# Patient Record
Sex: MALE | Race: BLACK OR AFRICAN AMERICAN | Employment: OTHER
[De-identification: names, ages, dates, MRNs, and addresses within clinical notes are randomized per-mention and may not be internally consistent; named-entity substitution may affect disease eponyms.]

---

## 2017-01-30 ENCOUNTER — OFFICE VISIT (OUTPATIENT)
Dept: INTERNAL MEDICINE | Facility: CLINIC | Age: 67
End: 2017-01-30

## 2017-01-30 VITALS
DIASTOLIC BLOOD PRESSURE: 75 MMHG | HEIGHT: 74 IN | WEIGHT: 173 LBS | SYSTOLIC BLOOD PRESSURE: 127 MMHG | HEART RATE: 76 BPM | BODY MASS INDEX: 22.2 KG/M2

## 2017-01-30 DIAGNOSIS — I25.10 CORONARY ARTERY DISEASE INVOLVING NATIVE CORONARY ARTERY OF NATIVE HEART WITHOUT ANGINA PECTORIS: ICD-10-CM

## 2017-01-30 DIAGNOSIS — K63.5 COLON POLYPS: Primary | ICD-10-CM

## 2017-01-30 DIAGNOSIS — E78.2 MIXED HYPERLIPIDEMIA: ICD-10-CM

## 2017-01-30 PROCEDURE — 4040F PNEUMOC VAC/ADMIN/RCVD: CPT | Performed by: INTERNAL MEDICINE

## 2017-01-30 PROCEDURE — G8598 ASA/ANTIPLAT THER USED: HCPCS | Performed by: INTERNAL MEDICINE

## 2017-01-30 PROCEDURE — 1036F TOBACCO NON-USER: CPT | Performed by: INTERNAL MEDICINE

## 2017-01-30 PROCEDURE — 1123F ACP DISCUSS/DSCN MKR DOCD: CPT | Performed by: INTERNAL MEDICINE

## 2017-01-30 PROCEDURE — G8427 DOCREV CUR MEDS BY ELIG CLIN: HCPCS | Performed by: INTERNAL MEDICINE

## 2017-01-30 PROCEDURE — G8419 CALC BMI OUT NRM PARAM NOF/U: HCPCS | Performed by: INTERNAL MEDICINE

## 2017-01-30 PROCEDURE — G8484 FLU IMMUNIZE NO ADMIN: HCPCS | Performed by: INTERNAL MEDICINE

## 2017-01-30 PROCEDURE — 3017F COLORECTAL CA SCREEN DOC REV: CPT | Performed by: INTERNAL MEDICINE

## 2017-01-30 PROCEDURE — 99213 OFFICE O/P EST LOW 20 MIN: CPT | Performed by: INTERNAL MEDICINE

## 2017-01-30 RX ORDER — ACETAMINOPHEN/DIPHENHYDRAMINE 500MG-25MG
TABLET ORAL
Refills: 0 | COMMUNITY
Start: 2017-01-14 | End: 2017-01-30

## 2017-01-30 RX ORDER — ROSUVASTATIN CALCIUM 20 MG/1
20 TABLET, COATED ORAL NIGHTLY
Qty: 90 TABLET | Refills: 1 | Status: SHIPPED | OUTPATIENT
Start: 2017-01-30 | End: 2017-05-08 | Stop reason: SDUPTHER

## 2017-01-30 RX ORDER — CARVEDILOL 3.12 MG/1
3.12 TABLET ORAL 2 TIMES DAILY WITH MEALS
Qty: 180 TABLET | Refills: 1 | Status: SHIPPED | OUTPATIENT
Start: 2017-01-30 | End: 2017-05-08 | Stop reason: SDUPTHER

## 2017-04-13 ENCOUNTER — TELEPHONE (OUTPATIENT)
Dept: INTERNAL MEDICINE | Age: 67
End: 2017-04-13

## 2017-05-08 ENCOUNTER — HOSPITAL ENCOUNTER (OUTPATIENT)
Age: 67
Setting detail: SPECIMEN
Discharge: HOME | End: 2017-05-08
Payer: MEDICARE

## 2017-05-08 ENCOUNTER — OFFICE VISIT (OUTPATIENT)
Dept: INTERNAL MEDICINE | Age: 67
End: 2017-05-08
Payer: MEDICARE

## 2017-05-08 VITALS
HEIGHT: 74 IN | HEART RATE: 75 BPM | OXYGEN SATURATION: 99 % | WEIGHT: 174 LBS | TEMPERATURE: 98.1 F | SYSTOLIC BLOOD PRESSURE: 150 MMHG | DIASTOLIC BLOOD PRESSURE: 93 MMHG | BODY MASS INDEX: 22.33 KG/M2

## 2017-05-08 DIAGNOSIS — R06.02 SOBOE (SHORTNESS OF BREATH ON EXERTION): ICD-10-CM

## 2017-05-08 DIAGNOSIS — I10 ESSENTIAL HYPERTENSION: ICD-10-CM

## 2017-05-08 DIAGNOSIS — Z23 NEED FOR PROPHYLACTIC VACCINATION AND INOCULATION AGAINST VARICELLA: ICD-10-CM

## 2017-05-08 DIAGNOSIS — E78.2 MIXED HYPERLIPIDEMIA: ICD-10-CM

## 2017-05-08 DIAGNOSIS — F17.200 SMOKING: ICD-10-CM

## 2017-05-08 DIAGNOSIS — Z23 NEED FOR PROPHYLACTIC VACCINATION AGAINST DIPHTHERIA-TETANUS-PERTUSSIS (DTP): ICD-10-CM

## 2017-05-08 DIAGNOSIS — I25.10 CORONARY ARTERY DISEASE INVOLVING NATIVE CORONARY ARTERY OF NATIVE HEART WITHOUT ANGINA PECTORIS: Primary | ICD-10-CM

## 2017-05-08 LAB
ANION GAP SERPL CALCULATED.3IONS-SCNC: 13 MMOL/L (ref 9–17)
BUN BLDV-MCNC: 17 MG/DL (ref 8–23)
BUN/CREAT BLD: ABNORMAL (ref 9–20)
CALCIUM SERPL-MCNC: 9.3 MG/DL (ref 8.6–10.4)
CHLORIDE BLD-SCNC: 104 MMOL/L (ref 98–107)
CO2: 24 MMOL/L (ref 20–31)
CREAT SERPL-MCNC: 0.67 MG/DL (ref 0.7–1.2)
GFR AFRICAN AMERICAN: >60 ML/MIN
GFR NON-AFRICAN AMERICAN: >60 ML/MIN
GFR SERPL CREATININE-BSD FRML MDRD: ABNORMAL ML/MIN/{1.73_M2}
GFR SERPL CREATININE-BSD FRML MDRD: ABNORMAL ML/MIN/{1.73_M2}
GLUCOSE BLD-MCNC: 89 MG/DL (ref 70–99)
POTASSIUM SERPL-SCNC: 4.2 MMOL/L (ref 3.7–5.3)
SODIUM BLD-SCNC: 141 MMOL/L (ref 135–144)

## 2017-05-08 PROCEDURE — 99213 OFFICE O/P EST LOW 20 MIN: CPT

## 2017-05-08 PROCEDURE — G8419 CALC BMI OUT NRM PARAM NOF/U: HCPCS | Performed by: INTERNAL MEDICINE

## 2017-05-08 PROCEDURE — 4040F PNEUMOC VAC/ADMIN/RCVD: CPT | Performed by: INTERNAL MEDICINE

## 2017-05-08 PROCEDURE — 99214 OFFICE O/P EST MOD 30 MIN: CPT | Performed by: INTERNAL MEDICINE

## 2017-05-08 PROCEDURE — 1123F ACP DISCUSS/DSCN MKR DOCD: CPT | Performed by: INTERNAL MEDICINE

## 2017-05-08 PROCEDURE — 3017F COLORECTAL CA SCREEN DOC REV: CPT | Performed by: INTERNAL MEDICINE

## 2017-05-08 PROCEDURE — 1036F TOBACCO NON-USER: CPT | Performed by: INTERNAL MEDICINE

## 2017-05-08 PROCEDURE — 36415 COLL VENOUS BLD VENIPUNCTURE: CPT

## 2017-05-08 PROCEDURE — 80048 BASIC METABOLIC PNL TOTAL CA: CPT

## 2017-05-08 PROCEDURE — G8427 DOCREV CUR MEDS BY ELIG CLIN: HCPCS | Performed by: INTERNAL MEDICINE

## 2017-05-08 PROCEDURE — G8598 ASA/ANTIPLAT THER USED: HCPCS | Performed by: INTERNAL MEDICINE

## 2017-05-08 RX ORDER — CARVEDILOL 6.25 MG/1
6.25 TABLET ORAL 2 TIMES DAILY WITH MEALS
Qty: 60 TABLET | Refills: 2 | Status: SHIPPED | OUTPATIENT
Start: 2017-05-08 | End: 2017-08-29 | Stop reason: ALTCHOICE

## 2017-05-08 RX ORDER — LISINOPRIL 5 MG/1
5 TABLET ORAL DAILY
Qty: 30 TABLET | Refills: 2 | Status: SHIPPED | OUTPATIENT
Start: 2017-05-08 | End: 2017-08-12 | Stop reason: SDUPTHER

## 2017-05-08 RX ORDER — ROSUVASTATIN CALCIUM 20 MG/1
20 TABLET, COATED ORAL NIGHTLY
Qty: 90 TABLET | Refills: 1 | Status: SHIPPED | OUTPATIENT
Start: 2017-05-08 | End: 2017-12-08 | Stop reason: SDUPTHER

## 2017-05-12 ENCOUNTER — TELEPHONE (OUTPATIENT)
Dept: INTERNAL MEDICINE | Age: 67
End: 2017-05-12

## 2017-05-24 ENCOUNTER — HOSPITAL ENCOUNTER (OUTPATIENT)
Dept: NON INVASIVE DIAGNOSTICS | Age: 67
Discharge: HOME | End: 2017-05-24
Payer: MEDICARE

## 2017-05-24 ENCOUNTER — HOSPITAL ENCOUNTER (OUTPATIENT)
Dept: VASCULAR LAB | Age: 67
Discharge: HOME | End: 2017-05-24
Payer: MEDICARE

## 2017-05-24 DIAGNOSIS — F17.200 SMOKING: ICD-10-CM

## 2017-05-24 DIAGNOSIS — R06.02 SOBOE (SHORTNESS OF BREATH ON EXERTION): ICD-10-CM

## 2017-05-24 DIAGNOSIS — Z13.6 SCREENING FOR AAA (ABDOMINAL AORTIC ANEURYSM): ICD-10-CM

## 2017-05-24 DIAGNOSIS — I25.10 CORONARY ARTERY DISEASE INVOLVING NATIVE CORONARY ARTERY OF NATIVE HEART WITHOUT ANGINA PECTORIS: ICD-10-CM

## 2017-05-24 LAB
LV EF: 58 %
LVEF MODALITY: NORMAL

## 2017-05-24 PROCEDURE — 76706 US ABDL AORTA SCREEN AAA: CPT

## 2017-05-24 PROCEDURE — 93306 TTE W/DOPPLER COMPLETE: CPT

## 2017-07-07 ENCOUNTER — OFFICE VISIT (OUTPATIENT)
Dept: INTERNAL MEDICINE | Age: 67
End: 2017-07-07
Payer: MEDICARE

## 2017-07-07 VITALS
HEART RATE: 72 BPM | DIASTOLIC BLOOD PRESSURE: 74 MMHG | SYSTOLIC BLOOD PRESSURE: 129 MMHG | BODY MASS INDEX: 21.17 KG/M2 | HEIGHT: 74 IN | WEIGHT: 165 LBS

## 2017-07-07 DIAGNOSIS — Z23 NEED FOR SHINGLES VACCINE: ICD-10-CM

## 2017-07-07 DIAGNOSIS — I25.10 CORONARY ARTERY DISEASE INVOLVING NATIVE CORONARY ARTERY OF NATIVE HEART WITHOUT ANGINA PECTORIS: ICD-10-CM

## 2017-07-07 DIAGNOSIS — H04.123 DRY EYES, BILATERAL: ICD-10-CM

## 2017-07-07 DIAGNOSIS — Z23 NEED FOR TDAP VACCINATION: ICD-10-CM

## 2017-07-07 DIAGNOSIS — E78.2 MIXED HYPERLIPIDEMIA: ICD-10-CM

## 2017-07-07 DIAGNOSIS — I10 ESSENTIAL HYPERTENSION: Primary | ICD-10-CM

## 2017-07-07 PROCEDURE — G8420 CALC BMI NORM PARAMETERS: HCPCS | Performed by: INTERNAL MEDICINE

## 2017-07-07 PROCEDURE — 1036F TOBACCO NON-USER: CPT | Performed by: INTERNAL MEDICINE

## 2017-07-07 PROCEDURE — 4040F PNEUMOC VAC/ADMIN/RCVD: CPT | Performed by: INTERNAL MEDICINE

## 2017-07-07 PROCEDURE — 3017F COLORECTAL CA SCREEN DOC REV: CPT | Performed by: INTERNAL MEDICINE

## 2017-07-07 PROCEDURE — 99213 OFFICE O/P EST LOW 20 MIN: CPT | Performed by: INTERNAL MEDICINE

## 2017-07-07 PROCEDURE — 1123F ACP DISCUSS/DSCN MKR DOCD: CPT | Performed by: INTERNAL MEDICINE

## 2017-07-07 PROCEDURE — G8427 DOCREV CUR MEDS BY ELIG CLIN: HCPCS | Performed by: INTERNAL MEDICINE

## 2017-07-07 PROCEDURE — 99212 OFFICE O/P EST SF 10 MIN: CPT

## 2017-07-07 PROCEDURE — G8598 ASA/ANTIPLAT THER USED: HCPCS | Performed by: INTERNAL MEDICINE

## 2017-07-07 RX ORDER — CARBOXYMETHYLCELLULOSE SODIUM 5 MG/ML
1 SOLUTION/ DROPS OPHTHALMIC 3 TIMES DAILY
Qty: 1 BOTTLE | Refills: 0 | Status: SHIPPED | OUTPATIENT
Start: 2017-07-07 | End: 2019-04-11

## 2017-08-07 DIAGNOSIS — I25.10 CORONARY ARTERY DISEASE INVOLVING NATIVE CORONARY ARTERY OF NATIVE HEART WITHOUT ANGINA PECTORIS: ICD-10-CM

## 2017-08-12 DIAGNOSIS — I10 ESSENTIAL HYPERTENSION: ICD-10-CM

## 2017-08-12 DIAGNOSIS — I25.10 CORONARY ARTERY DISEASE INVOLVING NATIVE CORONARY ARTERY OF NATIVE HEART WITHOUT ANGINA PECTORIS: ICD-10-CM

## 2017-08-29 RX ORDER — CARVEDILOL 3.12 MG/1
TABLET ORAL
Qty: 180 TABLET | Refills: 1 | Status: SHIPPED | OUTPATIENT
Start: 2017-08-29 | End: 2017-08-29 | Stop reason: ALTCHOICE

## 2017-08-29 RX ORDER — CARVEDILOL 6.25 MG/1
TABLET ORAL
Qty: 60 TABLET | Refills: 2 | Status: SHIPPED | OUTPATIENT
Start: 2017-08-29 | End: 2017-11-10 | Stop reason: SDUPTHER

## 2017-08-29 RX ORDER — LISINOPRIL 5 MG/1
TABLET ORAL
Qty: 30 TABLET | Refills: 2 | Status: SHIPPED | OUTPATIENT
Start: 2017-08-29 | End: 2017-11-10 | Stop reason: SDUPTHER

## 2017-11-10 ENCOUNTER — OFFICE VISIT (OUTPATIENT)
Dept: INTERNAL MEDICINE | Age: 67
End: 2017-11-10
Payer: MEDICARE

## 2017-11-10 VITALS
WEIGHT: 168 LBS | HEIGHT: 74 IN | RESPIRATION RATE: 16 BRPM | SYSTOLIC BLOOD PRESSURE: 180 MMHG | DIASTOLIC BLOOD PRESSURE: 90 MMHG | BODY MASS INDEX: 21.56 KG/M2 | HEART RATE: 71 BPM

## 2017-11-10 DIAGNOSIS — I25.10 CORONARY ARTERY DISEASE INVOLVING NATIVE CORONARY ARTERY OF NATIVE HEART WITHOUT ANGINA PECTORIS: Primary | ICD-10-CM

## 2017-11-10 DIAGNOSIS — I10 ESSENTIAL HYPERTENSION: ICD-10-CM

## 2017-11-10 DIAGNOSIS — E78.2 MIXED HYPERLIPIDEMIA: ICD-10-CM

## 2017-11-10 DIAGNOSIS — F10.10 ALCOHOL ABUSE: ICD-10-CM

## 2017-11-10 PROCEDURE — 3017F COLORECTAL CA SCREEN DOC REV: CPT | Performed by: INTERNAL MEDICINE

## 2017-11-10 PROCEDURE — 99213 OFFICE O/P EST LOW 20 MIN: CPT | Performed by: INTERNAL MEDICINE

## 2017-11-10 PROCEDURE — 1123F ACP DISCUSS/DSCN MKR DOCD: CPT | Performed by: INTERNAL MEDICINE

## 2017-11-10 PROCEDURE — G8484 FLU IMMUNIZE NO ADMIN: HCPCS | Performed by: INTERNAL MEDICINE

## 2017-11-10 PROCEDURE — G8427 DOCREV CUR MEDS BY ELIG CLIN: HCPCS | Performed by: INTERNAL MEDICINE

## 2017-11-10 PROCEDURE — G8598 ASA/ANTIPLAT THER USED: HCPCS | Performed by: INTERNAL MEDICINE

## 2017-11-10 PROCEDURE — 1036F TOBACCO NON-USER: CPT | Performed by: INTERNAL MEDICINE

## 2017-11-10 PROCEDURE — G8420 CALC BMI NORM PARAMETERS: HCPCS | Performed by: INTERNAL MEDICINE

## 2017-11-10 PROCEDURE — 4040F PNEUMOC VAC/ADMIN/RCVD: CPT | Performed by: INTERNAL MEDICINE

## 2017-11-10 RX ORDER — LISINOPRIL 5 MG/1
TABLET ORAL
Qty: 30 TABLET | Refills: 5 | Status: CANCELLED | OUTPATIENT
Start: 2017-11-10

## 2017-11-10 RX ORDER — LISINOPRIL 10 MG/1
TABLET ORAL
Qty: 30 TABLET | Refills: 2 | Status: SHIPPED | OUTPATIENT
Start: 2017-11-10 | End: 2018-02-02 | Stop reason: SDUPTHER

## 2017-11-10 RX ORDER — CARVEDILOL 6.25 MG/1
TABLET ORAL
Qty: 60 TABLET | Refills: 5 | Status: CANCELLED | OUTPATIENT
Start: 2017-11-10

## 2017-11-10 RX ORDER — CARVEDILOL 6.25 MG/1
TABLET ORAL
Qty: 60 TABLET | Refills: 2 | Status: SHIPPED | OUTPATIENT
Start: 2017-11-10 | End: 2018-07-12 | Stop reason: SDUPTHER

## 2017-11-10 ASSESSMENT — PATIENT HEALTH QUESTIONNAIRE - PHQ9
SUM OF ALL RESPONSES TO PHQ9 QUESTIONS 1 & 2: 0
SUM OF ALL RESPONSES TO PHQ QUESTIONS 1-9: 0
2. FEELING DOWN, DEPRESSED OR HOPELESS: 0
1. LITTLE INTEREST OR PLEASURE IN DOING THINGS: 0

## 2017-11-10 NOTE — PROGRESS NOTES
[facility]    Progress Note    Date of patient's visit: 11/10/2017  YOB: 1950  Patient's Name:  James Glaser    Patient Care Team:  Shaina Latif MD as PCP - General (Internal Medicine)  Charmayne Roe, MD as PCP - [facility] Attributed Provider    REASON FOR VISIT: Routine outpatient follow     HISTORY OF PRESENT ILLNESS:    History was obtained from the patient. James Glaser is a 69 y.o. is here for a  Follow up visit. Blood pressure 170-180/. He is on coreg 3.125 mg  Bid currently. He is not taking lisinopril. Discussed with him need to be compliant with meds. He is going to pharmacy to . Also drinking alcohol >4 drinks yesterday night. Patient Active Problem List   Diagnosis    Coronary artery disease s/p stent in 2014 by Dr. Mala Kan at [facility]    Mixed hyperlipidemia       ALLERGIES    No Known Allergies      MEDICATIONS:      Current Outpatient Prescriptions on File Prior to Visit   Medication Sig Dispense Refill    carvedilol (COREG) 6.25 MG tablet take 1 tablet by mouth twice a day with meals 60 tablet 2    carboxymethylcellulose (LUBRICANT EYE DROPS) 0.5 % SOLN ophthalmic solution Place 1 drop into both eyes 3 times daily 1 Bottle 0    aspirin 81 MG tablet Take 1 tablet by mouth daily 90 tablet 1    rosuvastatin (CRESTOR) 20 MG tablet Take 1 tablet by mouth nightly 90 tablet 1    nitroGLYCERIN (NITROSTAT) 0.4 MG SL tablet Place 0.4 mg under the tongue every 5 minutes as needed for Chest pain Dissolve 1 tab under tongue at first sign of chest pain. May repeat every 5 minutes until relief is obtained. If pain persists after taking 3 tabs in a 15-minute period, or the pain is different than is typically experienced, call 9-1-1 immediately.  lisinopril (PRINIVIL;ZESTRIL) 5 MG tablet take 1 tablet by mouth once daily 30 tablet 2     No current facility-administered medications on file prior to visit. SOCIAL HISTORY    Reviewed and no change from previous record. Gio Savage  reports that he has quit smoking. He has never used smokeless tobacco.    FAMILY HISTORY:    Reviewed and No change from previous visit    REVIEW OF SYSTEMS:    ENT: negative  Respiratory: no cough, shortness of breath, or wheezing  Cardiovascular: no chest pain or dyspnea on exertion  Gastrointestinal: no abdominal pain, black or bloody stools  Neurological: no TIA or stroke symptoms  Endocrine: negative  Genito-Urinary: no dysuria, trouble voiding, or hematuria  Musculoskeletal: negative  Dermatological: negative    PHYSICAL EXAM:      Vitals:    11/10/17 1436   BP: (!) 180/90   Pulse:    Resp:      Physical Exam   Constitutional: He is oriented to person, place, and time. He appears well-nourished. HENT:   Head: Atraumatic. Eyes: Conjunctivae are normal.   Neck: Neck supple. Cardiovascular: Normal rate. Pulmonary/Chest: Effort normal and breath sounds normal.   Abdominal: Soft. Neurological: He is alert and oriented to person, place, and time. Skin: Skin is warm. Psychiatric: He has a normal mood and affect.          LABORATORY FINDINGS:    CBC:  Lab Results   Component Value Date    WBC 6.9 09/30/2016    HGB 14.6 09/30/2016     09/30/2016     10/18/2011     BMP:    Lab Results   Component Value Date     05/08/2017    K 4.2 05/08/2017     05/08/2017    CO2 24 05/08/2017    BUN 17 05/08/2017    CREATININE 0.67 05/08/2017    GLUCOSE 89 05/08/2017    GLUCOSE 96 10/18/2011     HEMOGLOBIN A1C: No results found for: LABA1C  MICROALBUMIN URINE: No results found for: MICROALBUR  FASTING LIPID PANEL:  Lab Results   Component Value Date    CHOL 146 09/30/2016    HDL 76 09/30/2016    TRIG 56 09/30/2016     LIVER PROFILE:  Lab Results   Component Value Date    ALT 23 10/18/2011    AST 30 10/18/2011    BILITOT 0.44 10/18/2011    LABALBU 4.6 10/18/2011      THYROID FUNCTION:   Lab Results   Component Value Date TSH 1.16 09/30/2016      URINE ANALYSIS: No results found for: LABURIN  ASSESSMENT AND PLAN:    1. Coronary artery disease s/p stent in 2014 by Dr. Horacio Muller at [facility]    - carvedilol (COREG) 6.25 MG tablet; take 1 tablet by mouth twice a day with meals  Dispense: 60 tablet; Refill: 5  - follow with cardiology    2. Mixed hyperlipidemia  Stable on zocor      3. Essential hypertension uncontrolled  - increase coreg to 6.25 mg bid  - start taking lisinopril 5 mg po  - reduce alcohol cunsumption      FOLLOW UP:   2 weeks    1. Amber Raymond received counseling on the following healthy behaviors: exercise and medication adherence  2. Patient given educational materials - see patient instructions  3. Discussed use, benefit, and side effects of prescribed medications. Barriers to medication compliance addressed. All patient questions answered. Pt voiced understanding. 4.  Reviewed prior labs and health maintenance  5. Continue current medications, diet and exercise.          Solis Pack MD, PGY-1 Internal Medicine Resident  [address] [place] [address]  11/10/2017  2:38 PM

## 2017-11-10 NOTE — PROGRESS NOTES
HYPERTENSION visit     BP Readings from Last 3 Encounters:   07/07/17 129/74   05/08/17 (!) 150/93   01/30/17 127/75       LDL Cholesterol (mg/dL)   Date Value   09/30/2016 59     HDL (mg/dL)   Date Value   09/30/2016 76     BUN (mg/dL)   Date Value   05/08/2017 17     CREATININE (mg/dL)   Date Value   05/08/2017 0.67 (L)     Glucose (mg/dL)   Date Value   05/08/2017 89   10/18/2011 96              Have you changed or started any medications since your last visit including any over-the-counter medicines, vitamins, or herbal medicines? no   Have you stopped taking any of your medications? Is so, why? -  yes - heart doctor to him off one of his blood pressure medication   Are you having any side effects from any of your medications? - no    Have you seen any other physician or provider since your last visit?  no   Have you had any other diagnostic tests since your last visit?  no   Have you been seen in the emergency room and/or had an admission in a hospital since we last saw you?  no   Have you had your routine dental cleaning in the past 6 months?  no     Do you have an active MyChart account? If no, what is the barrier?   No: declined     Patient Care Team:  Corinna Bardales MD as PCP - General (Internal Medicine)  Uzair Romero MD as PCP - [facility] Attributed Provider    Medical History Review  Past Medical, Family, and Social History reviewed and does contribute to the patient presenting condition    Health Maintenance   Topic Date Due    Hepatitis C screen  1950    DTaP/Tdap/Td vaccine (1 - Tdap) 06/27/1967    Zostavax vaccine  06/27/2008    Pneumococcal low/med risk (1 of 2 - PCV13) 06/27/2013    Flu vaccine (1) 09/01/2017    Colon cancer screen colonoscopy  04/07/2019    Lipid screen  09/30/2021    AAA screen  Completed

## 2017-11-10 NOTE — PROGRESS NOTES
ATTENDING PHYSICIAN STATEMENT     I have discussed the care of Aman Soliman, including pertinent history and exam findings with the resident. I have reviewed the key elements of all parts of the encounter with the resident. I have seen and examined the patient with the resident and the key elements of all parts of the encounter have been performed by me. I agree with the assessment, and status of the problem list as documented. Additional Comments:  Patient is here to follow-up for hypertension, coronary artery disease. His blood pressure is elevated. He is on Coreg 3.125 mg daily. Seems like he has not been taking his lisinopril. He has history of coronary artery disease with a stent greater than 2 years back. He has been on aspirin. We will increase the dose of Coreg to 6.25 and lisinopril to 10 mg daily. Patient declined flu and pneumonia vaccination. The plan and orders should include  1. Coronary artery disease s/p stent in 2014 by Dr. Kimberly Flowers at [facility]  carvedilol (COREG) 6.25 MG tablet   2. Mixed hyperlipidemia     3. Essential hypertension  carvedilol (COREG) 6.25 MG tablet   4. Alcohol abuse            The return visit should be in 2 weeks . Tonie Moran MD  Attending and Faculty Internal Medicine  [address] [place] [address]  Internal Medicine [address] [place] [address], [facility]   11/10/17 2:50 PM      This note is created with the assistance of a speech-recognition program. While intending to generate a document that actually reflects the content of the visit, the document can still have some mistakes which may not have been identified and corrected by editing.

## 2017-11-20 ENCOUNTER — HOSPITAL ENCOUNTER (OUTPATIENT)
Age: 67
Setting detail: SPECIMEN
Discharge: HOME | End: 2017-11-20
Payer: MEDICARE

## 2017-11-20 DIAGNOSIS — E78.2 MIXED HYPERLIPIDEMIA: ICD-10-CM

## 2017-11-20 DIAGNOSIS — I10 ESSENTIAL HYPERTENSION: ICD-10-CM

## 2017-11-20 LAB
CHOLESTEROL/HDL RATIO: 1.7
CHOLESTEROL: 129 MG/DL
HDLC SERPL-MCNC: 74 MG/DL
LDL CHOLESTEROL: 43 MG/DL (ref 0–130)
TRIGL SERPL-MCNC: 59 MG/DL
VLDLC SERPL CALC-MCNC: NORMAL MG/DL (ref 1–30)

## 2017-11-20 PROCEDURE — 80061 LIPID PANEL: CPT

## 2017-11-20 PROCEDURE — 36415 COLL VENOUS BLD VENIPUNCTURE: CPT

## 2017-12-08 ENCOUNTER — OFFICE VISIT (OUTPATIENT)
Dept: INTERNAL MEDICINE | Age: 67
End: 2017-12-08
Payer: MEDICARE

## 2017-12-08 VITALS
HEART RATE: 66 BPM | BODY MASS INDEX: 21.53 KG/M2 | SYSTOLIC BLOOD PRESSURE: 135 MMHG | DIASTOLIC BLOOD PRESSURE: 84 MMHG | WEIGHT: 167.8 LBS

## 2017-12-08 DIAGNOSIS — I25.10 CORONARY ARTERY DISEASE INVOLVING NATIVE CORONARY ARTERY OF NATIVE HEART WITHOUT ANGINA PECTORIS: ICD-10-CM

## 2017-12-08 DIAGNOSIS — F10.10 ALCOHOL ABUSE: ICD-10-CM

## 2017-12-08 DIAGNOSIS — Z23 NEED FOR INFLUENZA VACCINATION: ICD-10-CM

## 2017-12-08 DIAGNOSIS — I10 ESSENTIAL HYPERTENSION: Primary | ICD-10-CM

## 2017-12-08 DIAGNOSIS — E78.2 MIXED HYPERLIPIDEMIA: ICD-10-CM

## 2017-12-08 PROCEDURE — 1123F ACP DISCUSS/DSCN MKR DOCD: CPT | Performed by: INTERNAL MEDICINE

## 2017-12-08 PROCEDURE — G0008 ADMIN INFLUENZA VIRUS VAC: HCPCS | Performed by: INTERNAL MEDICINE

## 2017-12-08 PROCEDURE — G8484 FLU IMMUNIZE NO ADMIN: HCPCS | Performed by: INTERNAL MEDICINE

## 2017-12-08 PROCEDURE — 4040F PNEUMOC VAC/ADMIN/RCVD: CPT | Performed by: INTERNAL MEDICINE

## 2017-12-08 PROCEDURE — 3017F COLORECTAL CA SCREEN DOC REV: CPT | Performed by: INTERNAL MEDICINE

## 2017-12-08 PROCEDURE — G8420 CALC BMI NORM PARAMETERS: HCPCS | Performed by: INTERNAL MEDICINE

## 2017-12-08 PROCEDURE — 1036F TOBACCO NON-USER: CPT | Performed by: INTERNAL MEDICINE

## 2017-12-08 PROCEDURE — 90688 IIV4 VACCINE SPLT 0.5 ML IM: CPT | Performed by: INTERNAL MEDICINE

## 2017-12-08 PROCEDURE — 99213 OFFICE O/P EST LOW 20 MIN: CPT | Performed by: INTERNAL MEDICINE

## 2017-12-08 PROCEDURE — G8427 DOCREV CUR MEDS BY ELIG CLIN: HCPCS | Performed by: INTERNAL MEDICINE

## 2017-12-08 PROCEDURE — G8598 ASA/ANTIPLAT THER USED: HCPCS | Performed by: INTERNAL MEDICINE

## 2017-12-08 RX ORDER — ROSUVASTATIN CALCIUM 20 MG/1
20 TABLET, COATED ORAL NIGHTLY
Qty: 90 TABLET | Refills: 1 | Status: SHIPPED | OUTPATIENT
Start: 2017-12-08 | End: 2018-07-12 | Stop reason: SDUPTHER

## 2017-12-08 NOTE — PROGRESS NOTES
[facility]    Progress Note    Date of patient's visit: 12/8/2017  YOB: 1950  Patient's Name:  Ashley Jacinto    Patient Care Team:  Mony Cardona MD as PCP - General (Internal Medicine)  Ryan Landers MD as PCP - [facility] Attributed Provider    REASON FOR VISIT: Routine outpatient follow     HISTORY OF PRESENT ILLNESS:    History was obtained from the patient. Ashley Jacinto is a 69 y.o. is here for blood pressure. Blood pressure still elevated 144/90. He is complain with meds. He continues to drink but refused to tell us how much he drinks. Non smoker. He follows with cardiology. Discussed alcohol cessation and pt wants to cutdown slowly. Patient Active Problem List   Diagnosis    Coronary artery disease s/p stent in 2014 by Dr. Judy Chaparro at [facility]    Mixed hyperlipidemia       ALLERGIES    No Known Allergies      MEDICATIONS:      Current Outpatient Prescriptions on File Prior to Visit   Medication Sig Dispense Refill    carvedilol (COREG) 6.25 MG tablet take 1 tablet by mouth twice a day with meals 60 tablet 2    lisinopril (PRINIVIL;ZESTRIL) 10 MG tablet take 1 tablet by mouth once daily 30 tablet 2    carboxymethylcellulose (LUBRICANT EYE DROPS) 0.5 % SOLN ophthalmic solution Place 1 drop into both eyes 3 times daily 1 Bottle 0    aspirin 81 MG tablet Take 1 tablet by mouth daily 90 tablet 1    rosuvastatin (CRESTOR) 20 MG tablet Take 1 tablet by mouth nightly 90 tablet 1    nitroGLYCERIN (NITROSTAT) 0.4 MG SL tablet Place 0.4 mg under the tongue every 5 minutes as needed for Chest pain Dissolve 1 tab under tongue at first sign of chest pain. May repeat every 5 minutes until relief is obtained. If pain persists after taking 3 tabs in a 15-minute period, or the pain is different than is typically experienced, call 9-1-1 immediately. No current facility-administered medications on file prior to visit.       SOCIAL HISTORY    Reviewed and no change from previous record. Rosaura Medley  reports that he has quit smoking. He has never used smokeless tobacco.    FAMILY HISTORY:    Reviewed and No change from previous visit    REVIEW OF SYSTEMS:    ENT: negative  Respiratory: no cough, shortness of breath, or wheezing  Cardiovascular: no chest pain or dyspnea on exertion  Gastrointestinal: no abdominal pain, black or bloody stools  Neurological: no TIA or stroke symptoms  Endocrine: negative  Genito-Urinary: no dysuria, trouble voiding, or hematuria  Musculoskeletal: negative  Dermatological: negative    PHYSICAL EXAM:      Vitals:    12/08/17 0907   BP: (!) 144/89   Pulse: 68     Physical Exam   Constitutional: He appears well-developed. HENT:   Head: Atraumatic. Eyes: EOM are normal.   Neck: Neck supple. Cardiovascular: Normal rate. Neurological: He is alert. Skin: Skin is warm. Psychiatric: He has a normal mood and affect. LABORATORY FINDINGS:    CBC:  Lab Results   Component Value Date    WBC 6.9 09/30/2016    HGB 14.6 09/30/2016     09/30/2016     10/18/2011     BMP:    Lab Results   Component Value Date     05/08/2017    K 4.2 05/08/2017     05/08/2017    CO2 24 05/08/2017    BUN 17 05/08/2017    CREATININE 0.67 05/08/2017    GLUCOSE 89 05/08/2017    GLUCOSE 96 10/18/2011     HEMOGLOBIN A1C: No results found for: LABA1C  MICROALBUMIN URINE: No results found for: MICROALBUR  FASTING LIPID PANEL:  Lab Results   Component Value Date    CHOL 129 11/20/2017    HDL 74 11/20/2017    TRIG 59 11/20/2017     LIVER PROFILE:  Lab Results   Component Value Date    ALT 23 10/18/2011    AST 30 10/18/2011    BILITOT 0.44 10/18/2011    LABALBU 4.6 10/18/2011      THYROID FUNCTION:   Lab Results   Component Value Date    TSH 1.16 09/30/2016      URINE ANALYSIS: No results found for: LABURIN  ASSESSMENT AND PLAN:    1.  Essential hypertension uncontrolled  - cont lisinopril 10 mg   - cont coreg 6.26 mg

## 2017-12-08 NOTE — PROGRESS NOTES
Attending Physician Statement GE  I have discussed the care of Megan Bauer, including pertinent history and exam findings with the resident. I have reviewed the key elements of all parts of the encounter with the resident.     htn-continue Lisinopril 10 mg, Coreg-6.25 mg BID  Dyslipidemia-Crestor 20 mg  CAD s/p stents 2014-ASA 81. crestor 20 mg, Lisinopril 10 mg, Coreg-6.25 mg BID- saw cardiology- was told to follow up in 6 months  Does not smoke  Alcohol use- counseled for decreasing alcohol consumption  Flu shot  Today  Refused other vaccines. Will order hep C antibody the next and we'll order blood work.     Follow up in 6 months    Charlotte Ibarra MD

## 2017-12-08 NOTE — PROGRESS NOTES
Visit Information    Have you changed or started any medications since your last visit including any over-the-counter medicines, vitamins, or herbal medicines? no   Are you having any side effects from any of your medications? -  no  Have you stopped taking any of your medications? Is so, why? -  no    Have you seen any other physician or provider since your last visit? No  Have you had any other diagnostic tests since your last visit? No  Have you been seen in the emergency room and/or had an admission to a hospital since we last saw you? No  Have you had your routine dental cleaning in the past 6 months? no    Have you activated your Spark account? If not, what are your barriers?  No:      Patient Care Team:  Bakari Lal MD as PCP - General (Internal Medicine)  Kaila Manley MD as PCP - S Attributed Provider    Medical History Review  Past Medical, Family, and Social History reviewed and does contribute to the patient presenting condition    Health Maintenance   Topic Date Due    Zostavax vaccine  02/28/2018 (Originally 6/27/2008)   [place] [employer] DTaP/Tdap/Td vaccine (1 - Tdap) 02/28/2018 (Originally 6/27/1967)    Flu vaccine (1) 02/28/2018 (Originally 9/1/2017)    Pneumococcal low/med risk (1 of 2 - PCV13) 02/28/2018 (Originally 6/27/2013)    Hepatitis C screen  02/28/2018 (Originally 1950)    Colon cancer screen colonoscopy  04/07/2019    Lipid screen  11/20/2022    AAA screen  Completed

## 2018-02-02 DIAGNOSIS — I10 ESSENTIAL HYPERTENSION: ICD-10-CM

## 2018-02-02 NOTE — TELEPHONE ENCOUNTER
Lisinopril 10 mg pending for refill       Next Visit Date:  Future Appointments  Date Time Provider Hieu Meili   6/8/2018 10:15 AM Wendy Cedeño MD [address] [facility] Maintenance   Topic Date Due    Zostavax vaccine  02/28/2018 (Originally 6/27/2008)    DTaP/Tdap/Td vaccine (1 - Tdap) 02/28/2018 (Originally 6/27/1967)    Pneumococcal low/med risk (1 of 2 - PCV13) 02/28/2018 (Originally 6/27/2013)    Hepatitis C screen  02/28/2018 (Originally 1950)    Potassium monitoring  05/08/2018    Creatinine monitoring  05/08/2018    Colon cancer screen colonoscopy  04/07/2019    Lipid screen  11/20/2022    Flu vaccine  Completed    AAA screen  Completed             (applicable per patient's age: Cancer Screenings, Depression Screening, Fall Risk Screening, Immunizations)    LDL Cholesterol (mg/dL)   Date Value   11/20/2017 43     AST (U/L)   Date Value   10/18/2011 30     ALT (U/L)   Date Value   10/18/2011 23     BUN (mg/dL)   Date Value   05/08/2017 17      (goal A1C is < 7)   (goal LDL is <100) need 30-50% reduction from baseline     BP Readings from Last 3 Encounters:   12/08/17 135/84   11/10/17 (!) 180/90   07/07/17 129/74    (goal /80)      All Future Testing planned in [facility]:  Lab Frequency Next Occurrence            Patient Active Problem List:     Coronary artery disease s/p stent in 2014 by Dr. Tanna Noguera at [facility]     Mixed hyperlipidemia

## 2018-02-04 RX ORDER — LISINOPRIL 10 MG/1
TABLET ORAL
Qty: 30 TABLET | Refills: 2 | Status: SHIPPED | OUTPATIENT
Start: 2018-02-04 | End: 2018-05-09 | Stop reason: SDUPTHER

## 2018-05-09 DIAGNOSIS — I25.10 CORONARY ARTERY DISEASE INVOLVING NATIVE CORONARY ARTERY OF NATIVE HEART WITHOUT ANGINA PECTORIS: ICD-10-CM

## 2018-05-09 DIAGNOSIS — I10 ESSENTIAL HYPERTENSION: ICD-10-CM

## 2018-05-17 RX ORDER — LISINOPRIL 10 MG/1
TABLET ORAL
Qty: 90 TABLET | Refills: 0 | Status: SHIPPED | OUTPATIENT
Start: 2018-05-17 | End: 2018-07-12 | Stop reason: SDUPTHER

## 2018-05-17 RX ORDER — ACETAMINOPHEN/DIPHENHYDRAMINE 500MG-25MG
TABLET ORAL
Qty: 90 TABLET | Refills: 1 | Status: SHIPPED | OUTPATIENT
Start: 2018-05-17 | End: 2018-07-12 | Stop reason: ALTCHOICE

## 2018-07-12 ENCOUNTER — OFFICE VISIT (OUTPATIENT)
Dept: INTERNAL MEDICINE | Age: 68
End: 2018-07-12
Payer: MEDICARE

## 2018-07-12 ENCOUNTER — HOSPITAL ENCOUNTER (OUTPATIENT)
Age: 68
Setting detail: SPECIMEN
Discharge: HOME | End: 2018-07-12
Payer: MEDICARE

## 2018-07-12 VITALS
HEIGHT: 74 IN | HEART RATE: 83 BPM | WEIGHT: 165 LBS | BODY MASS INDEX: 21.17 KG/M2 | SYSTOLIC BLOOD PRESSURE: 125 MMHG | DIASTOLIC BLOOD PRESSURE: 79 MMHG

## 2018-07-12 DIAGNOSIS — Z11.59 ENCOUNTER FOR HEPATITIS C SCREENING TEST FOR LOW RISK PATIENT: ICD-10-CM

## 2018-07-12 DIAGNOSIS — I10 ESSENTIAL HYPERTENSION: Primary | ICD-10-CM

## 2018-07-12 DIAGNOSIS — E78.2 MIXED HYPERLIPIDEMIA: ICD-10-CM

## 2018-07-12 DIAGNOSIS — I10 ESSENTIAL HYPERTENSION: ICD-10-CM

## 2018-07-12 DIAGNOSIS — I25.10 CORONARY ARTERY DISEASE INVOLVING NATIVE CORONARY ARTERY OF NATIVE HEART WITHOUT ANGINA PECTORIS: ICD-10-CM

## 2018-07-12 LAB
ANION GAP SERPL CALCULATED.3IONS-SCNC: 15 MMOL/L (ref 9–17)
BUN BLDV-MCNC: 14 MG/DL (ref 8–23)
BUN/CREAT BLD: NORMAL (ref 9–20)
CALCIUM SERPL-MCNC: 9.6 MG/DL (ref 8.6–10.4)
CHLORIDE BLD-SCNC: 100 MMOL/L (ref 98–107)
CO2: 27 MMOL/L (ref 20–31)
CREAT SERPL-MCNC: 0.79 MG/DL (ref 0.7–1.2)
GFR AFRICAN AMERICAN: >60 ML/MIN
GFR NON-AFRICAN AMERICAN: >60 ML/MIN
GFR SERPL CREATININE-BSD FRML MDRD: NORMAL ML/MIN/{1.73_M2}
GFR SERPL CREATININE-BSD FRML MDRD: NORMAL ML/MIN/{1.73_M2}
GLUCOSE BLD-MCNC: 93 MG/DL (ref 70–99)
HEPATITIS C ANTIBODY: NONREACTIVE
POTASSIUM SERPL-SCNC: 4 MMOL/L (ref 3.7–5.3)
SODIUM BLD-SCNC: 142 MMOL/L (ref 135–144)

## 2018-07-12 PROCEDURE — G8420 CALC BMI NORM PARAMETERS: HCPCS | Performed by: INTERNAL MEDICINE

## 2018-07-12 PROCEDURE — 99213 OFFICE O/P EST LOW 20 MIN: CPT | Performed by: INTERNAL MEDICINE

## 2018-07-12 PROCEDURE — 86803 HEPATITIS C AB TEST: CPT

## 2018-07-12 PROCEDURE — 80048 BASIC METABOLIC PNL TOTAL CA: CPT

## 2018-07-12 PROCEDURE — 36415 COLL VENOUS BLD VENIPUNCTURE: CPT

## 2018-07-12 PROCEDURE — 3017F COLORECTAL CA SCREEN DOC REV: CPT | Performed by: INTERNAL MEDICINE

## 2018-07-12 PROCEDURE — 4040F PNEUMOC VAC/ADMIN/RCVD: CPT | Performed by: INTERNAL MEDICINE

## 2018-07-12 PROCEDURE — 1036F TOBACCO NON-USER: CPT | Performed by: INTERNAL MEDICINE

## 2018-07-12 PROCEDURE — 1101F PT FALLS ASSESS-DOCD LE1/YR: CPT | Performed by: INTERNAL MEDICINE

## 2018-07-12 PROCEDURE — 1123F ACP DISCUSS/DSCN MKR DOCD: CPT | Performed by: INTERNAL MEDICINE

## 2018-07-12 PROCEDURE — G8598 ASA/ANTIPLAT THER USED: HCPCS | Performed by: INTERNAL MEDICINE

## 2018-07-12 PROCEDURE — G8427 DOCREV CUR MEDS BY ELIG CLIN: HCPCS | Performed by: INTERNAL MEDICINE

## 2018-07-12 RX ORDER — ROSUVASTATIN CALCIUM 20 MG/1
20 TABLET, COATED ORAL NIGHTLY
Qty: 90 TABLET | Refills: 1 | Status: SHIPPED | OUTPATIENT
Start: 2018-07-12 | End: 2018-08-09 | Stop reason: SDUPTHER

## 2018-07-12 RX ORDER — CARBOXYMETHYLCELLULOSE SODIUM 5 MG/ML
1 SOLUTION/ DROPS OPHTHALMIC 3 TIMES DAILY
Qty: 1 BOTTLE | Refills: 0 | Status: CANCELLED | OUTPATIENT
Start: 2018-07-12

## 2018-07-12 RX ORDER — ASPIRIN 81 MG/1
TABLET ORAL
Qty: 90 TABLET | Refills: 1 | Status: CANCELLED | OUTPATIENT
Start: 2018-07-12

## 2018-07-12 RX ORDER — LISINOPRIL 10 MG/1
TABLET ORAL
Qty: 90 TABLET | Refills: 0 | Status: SHIPPED | OUTPATIENT
Start: 2018-07-12 | End: 2018-08-09 | Stop reason: SDUPTHER

## 2018-07-12 RX ORDER — ASPIRIN 81 MG/1
81 TABLET ORAL DAILY
Qty: 30 TABLET | Refills: 3 | Status: SHIPPED | OUTPATIENT
Start: 2018-07-12 | End: 2018-08-09 | Stop reason: SDUPTHER

## 2018-07-12 RX ORDER — CARVEDILOL 6.25 MG/1
TABLET ORAL
Qty: 60 TABLET | Refills: 2 | Status: SHIPPED | OUTPATIENT
Start: 2018-07-12 | End: 2018-08-09 | Stop reason: SDUPTHER

## 2018-07-12 RX ORDER — NITROGLYCERIN 0.4 MG/1
0.4 TABLET SUBLINGUAL EVERY 5 MIN PRN
Qty: 25 TABLET | Status: CANCELLED | OUTPATIENT
Start: 2018-07-12

## 2018-07-12 NOTE — PROGRESS NOTES
Chronic Disease Visit Information    BP Readings from Last 3 Encounters:   12/08/17 135/84   11/10/17 (!) 180/90   07/07/17 129/74          LDL Cholesterol (mg/dL)   Date Value   11/20/2017 43     HDL (mg/dL)   Date Value   11/20/2017 74     BUN (mg/dL)   Date Value   05/08/2017 17     CREATININE (mg/dL)   Date Value   05/08/2017 0.67 (L)     Glucose (mg/dL)   Date Value   05/08/2017 89   10/18/2011 96            Have you changed or started any medications since your last visit including any over-the-counter medicines, vitamins, or herbal medicines? no   Are you having any side effects from any of your medications? -  no  Have you stopped taking any of your medications? Is so, why? -  no    Have you seen any other physician or provider since your last visit? No  Have you had any other diagnostic tests since your last visit? No  Have you been seen in the emergency room and/or had an admission to a hospital since we last saw you? No  Have you had your annual diabetic retinal (eye) exam? No  Have you had your routine dental cleaning in the past 6 months? no    Have you activated your PayPal account? If not, what are your barriers?  Yes     Patient Care Team:  Eve Mensah MD as PCP - General (Internal Medicine)  Efrain Bergeron MD as PCP - S Attributed Provider         Medical History Review  Past Medical, Family, and Social History reviewed and does not contribute to the patient presenting condition    Health Maintenance   Topic Date Due    Hepatitis C screen  1950    DTaP/Tdap/Td vaccine (1 - Tdap) 06/27/1967    Shingles Vaccine (1 of 2 - 2 Dose Series) 06/27/1998    Pneumococcal low/med risk (1 of 2 - PCV13) 06/27/2013    Potassium monitoring  05/08/2018    Creatinine monitoring  05/08/2018    Flu vaccine (1) 09/01/2018    Colon cancer screen colonoscopy  04/07/2019    Lipid screen  11/20/2022    AAA screen  Completed
tablet by mouth twice a day with meals 60 tablet 2    carboxymethylcellulose (LUBRICANT EYE DROPS) 0.5 % SOLN ophthalmic solution Place 1 drop into both eyes 3 times daily 1 Bottle 0    nitroGLYCERIN (NITROSTAT) 0.4 MG SL tablet Place 0.4 mg under the tongue every 5 minutes as needed for Chest pain Dissolve 1 tab under tongue at first sign of chest pain. May repeat every 5 minutes until relief is obtained. If pain persists after taking 3 tabs in a 15-minute period, or the pain is different than is typically experienced, call 9-1-1 immediately. No current facility-administered medications on file prior to visit. SOCIAL HISTORY    Reviewed and no change from previous record. Timi Amanda  reports that he quit smoking about 23 years ago. He has never used smokeless tobacco.    FAMILY HISTORY:    Reviewed and No change from previous visit    REVIEW OF SYSTEMS:    ENT: negative  Respiratory: no cough, shortness of breath, or wheezing  Cardiovascular: no chest pain or dyspnea on exertion  Gastrointestinal: no abdominal pain, black or bloody stools  Neurological: no TIA or stroke symptoms  Endocrine: negative  Genito-Urinary: no dysuria, trouble voiding, or hematuria  Musculoskeletal: negative  Dermatological: negative    PHYSICAL EXAM:      Vitals:    07/12/18 0949   BP: 125/79   Pulse: 83     Physical Exam   Constitutional: He appears well-nourished. HENT:   Head: Atraumatic. Eyes: Conjunctivae are normal.   Neck: Neck supple. Cardiovascular: Normal rate. Pulmonary/Chest: Effort normal.   Abdominal: Soft. Neurological: He is alert. Skin: Skin is warm. Psychiatric: He has a normal mood and affect.          LABORATORY FINDINGS:    CBC:  Lab Results   Component Value Date    WBC 6.9 09/30/2016    HGB 14.6 09/30/2016     09/30/2016     10/18/2011     BMP:    Lab Results   Component Value Date     05/08/2017    K 4.2 05/08/2017     05/08/2017    CO2 24 05/08/2017    BUN 17

## 2018-07-12 NOTE — PATIENT INSTRUCTIONS
with you to registration department. OC-Light hemoccult collection kit given to patient and procedure explained.       Pietro Epps

## 2018-07-23 ENCOUNTER — TELEPHONE (OUTPATIENT)
Dept: INTERNAL MEDICINE | Age: 68
End: 2018-07-23

## 2018-08-09 ENCOUNTER — OFFICE VISIT (OUTPATIENT)
Dept: INTERNAL MEDICINE | Age: 68
End: 2018-08-09
Payer: MEDICARE

## 2018-08-09 VITALS
BODY MASS INDEX: 20.8 KG/M2 | SYSTOLIC BLOOD PRESSURE: 132 MMHG | DIASTOLIC BLOOD PRESSURE: 78 MMHG | HEART RATE: 76 BPM | WEIGHT: 162 LBS

## 2018-08-09 DIAGNOSIS — E78.2 MIXED HYPERLIPIDEMIA: ICD-10-CM

## 2018-08-09 DIAGNOSIS — I25.10 CORONARY ARTERY DISEASE INVOLVING NATIVE CORONARY ARTERY OF NATIVE HEART WITHOUT ANGINA PECTORIS: ICD-10-CM

## 2018-08-09 DIAGNOSIS — I10 ESSENTIAL HYPERTENSION: ICD-10-CM

## 2018-08-09 PROCEDURE — G8598 ASA/ANTIPLAT THER USED: HCPCS | Performed by: INTERNAL MEDICINE

## 2018-08-09 PROCEDURE — 4040F PNEUMOC VAC/ADMIN/RCVD: CPT | Performed by: INTERNAL MEDICINE

## 2018-08-09 PROCEDURE — 99213 OFFICE O/P EST LOW 20 MIN: CPT | Performed by: INTERNAL MEDICINE

## 2018-08-09 PROCEDURE — 1123F ACP DISCUSS/DSCN MKR DOCD: CPT | Performed by: INTERNAL MEDICINE

## 2018-08-09 PROCEDURE — G8427 DOCREV CUR MEDS BY ELIG CLIN: HCPCS | Performed by: INTERNAL MEDICINE

## 2018-08-09 PROCEDURE — 1101F PT FALLS ASSESS-DOCD LE1/YR: CPT | Performed by: INTERNAL MEDICINE

## 2018-08-09 PROCEDURE — G8420 CALC BMI NORM PARAMETERS: HCPCS | Performed by: INTERNAL MEDICINE

## 2018-08-09 PROCEDURE — 3017F COLORECTAL CA SCREEN DOC REV: CPT | Performed by: INTERNAL MEDICINE

## 2018-08-09 PROCEDURE — 1036F TOBACCO NON-USER: CPT | Performed by: INTERNAL MEDICINE

## 2018-08-09 RX ORDER — ASPIRIN 81 MG/1
81 TABLET ORAL DAILY
Qty: 30 TABLET | Refills: 5 | Status: SHIPPED | OUTPATIENT
Start: 2018-08-09 | End: 2019-02-14 | Stop reason: SDUPTHER

## 2018-08-09 RX ORDER — LISINOPRIL 10 MG/1
TABLET ORAL
Qty: 90 TABLET | Refills: 5 | Status: SHIPPED | OUTPATIENT
Start: 2018-08-09 | End: 2019-02-14 | Stop reason: ALTCHOICE

## 2018-08-09 RX ORDER — ROSUVASTATIN CALCIUM 20 MG/1
20 TABLET, COATED ORAL NIGHTLY
Qty: 30 TABLET | Refills: 5 | Status: SHIPPED | OUTPATIENT
Start: 2018-08-09 | End: 2019-02-14 | Stop reason: SDUPTHER

## 2018-08-09 RX ORDER — CARVEDILOL 6.25 MG/1
TABLET ORAL
Qty: 60 TABLET | Refills: 5 | Status: SHIPPED | OUTPATIENT
Start: 2018-08-09 | End: 2019-02-14 | Stop reason: SDUPTHER

## 2018-08-09 NOTE — PROGRESS NOTES
Attending Supervising Physicians Attestation Statement  The patient met the criteria for indirect supervision. I discussed the findings and plans with the resident physician and agree as documented in his note .      Electronically signed by Wil Rankin MD on 8/9/18 at 10:26 AM
LABALBU 4.6 10/18/2011      THYROID FUNCTION:   Lab Results   Component Value Date    TSH 1.16 09/30/2016      URINE ANALYSIS: No results found for: LABURIN  ASSESSMENT AND PLAN:    1. Essential hypertension    - lisinopril (PRINIVIL;ZESTRIL) 10 MG tablet; take 1 tablet by mouth once daily  Dispense: 90 tablet; Refill: 5  - carvedilol (COREG) 6.25 MG tablet; take 1 tablet by mouth twice a day with meals  Dispense: 60 tablet; Refill: 5    2. Coronary artery disease s/p stent in 2014 by Dr. Vu Noel at [facility]    - aspirin EC 81 MG EC tablet; Take 1 tablet by mouth daily  Dispense: 30 tablet; Refill: 5  - carvedilol (COREG) 6.25 MG tablet; take 1 tablet by mouth twice a day with meals  Dispense: 60 tablet; Refill: 5    3. Mixed hyperlipidemia    - rosuvastatin (CRESTOR) 20 MG tablet; Take 1 tablet by mouth nightly  Dispense: 30 tablet; Refill: 5      FOLLOW UP:   6 months    1. Jeanette Feng received counseling on the following healthy behaviors: nutrition, exercise, medication adherence and decrease in alcohol consumption  2. Patient given educational materials - see patient instructions  3. Discussed use, benefit, and side effects of prescribed medications. Barriers to medication compliance addressed. All patient questions answered. Pt voiced understanding. 4.  Reviewed prior labs and health maintenance  5. Continue current medications, diet and exercise.          Meredith Meehan MD, Internal Medicine Resident  [facility]  8/9/2018  10:20 AM

## 2018-08-09 NOTE — PATIENT INSTRUCTIONS
Return To Clinic 2/14/18. After Visit Summary given and reviewed. bb    It is very important for your care that you keep your appointment. If for some reason you are unable to keep your appointment it is equally important that you call our office at [phone] to cancel your appointment and reschedule. Failure to do so may result in your termination from our practice.

## 2018-08-17 ENCOUNTER — HOSPITAL ENCOUNTER (OUTPATIENT)
Age: 68
Discharge: HOME | End: 2018-08-17
Payer: MEDICARE

## 2018-08-17 DIAGNOSIS — Z12.11 COLON CANCER SCREENING: Primary | ICD-10-CM

## 2018-08-17 LAB
CONTROL: NORMAL
HEMOCCULT STL QL: NORMAL

## 2018-08-17 PROCEDURE — 82274 ASSAY TEST FOR BLOOD FECAL: CPT | Performed by: INTERNAL MEDICINE

## 2018-09-23 ENCOUNTER — HOSPITAL ENCOUNTER (EMERGENCY)
Age: 68
Discharge: HOME | End: 2018-09-23
Attending: EMERGENCY MEDICINE
Payer: MEDICARE

## 2018-09-23 VITALS
OXYGEN SATURATION: 100 % | BODY MASS INDEX: 21.17 KG/M2 | SYSTOLIC BLOOD PRESSURE: 160 MMHG | WEIGHT: 165 LBS | TEMPERATURE: 98.4 F | HEART RATE: 67 BPM | DIASTOLIC BLOOD PRESSURE: 81 MMHG | HEIGHT: 74 IN | RESPIRATION RATE: 10 BRPM

## 2018-09-23 DIAGNOSIS — N48.30 PRIAPISM: Primary | ICD-10-CM

## 2018-09-23 LAB
-: ABNORMAL
AMORPHOUS: ABNORMAL
ANION GAP SERPL CALCULATED.3IONS-SCNC: 14 MMOL/L (ref 9–17)
BACTERIA: ABNORMAL
BILIRUBIN URINE: NEGATIVE
BUN BLDV-MCNC: 19 MG/DL (ref 8–23)
BUN/CREAT BLD: ABNORMAL (ref 9–20)
CALCIUM SERPL-MCNC: 9.4 MG/DL (ref 8.6–10.4)
CASTS UA: ABNORMAL /LPF (ref 0–2)
CHLORIDE BLD-SCNC: 100 MMOL/L (ref 98–107)
CO2: 25 MMOL/L (ref 20–31)
COLOR: YELLOW
CREAT SERPL-MCNC: 0.79 MG/DL (ref 0.7–1.2)
CRYSTALS, UA: ABNORMAL /HPF
EPITHELIAL CELLS UA: ABNORMAL /HPF (ref 0–5)
GFR AFRICAN AMERICAN: >60 ML/MIN
GFR NON-AFRICAN AMERICAN: >60 ML/MIN
GFR SERPL CREATININE-BSD FRML MDRD: ABNORMAL ML/MIN/{1.73_M2}
GFR SERPL CREATININE-BSD FRML MDRD: ABNORMAL ML/MIN/{1.73_M2}
GLUCOSE BLD-MCNC: 109 MG/DL (ref 70–99)
GLUCOSE URINE: NEGATIVE
HCT VFR BLD CALC: 36.5 % (ref 40.7–50.3)
HEMOGLOBIN: 12.4 G/DL (ref 13–17)
INR BLD: 1
KETONES, URINE: ABNORMAL
LEUKOCYTE ESTERASE, URINE: ABNORMAL
MCH RBC QN AUTO: 32.2 PG (ref 25.2–33.5)
MCHC RBC AUTO-ENTMCNC: 34 G/DL (ref 28.4–34.8)
MCV RBC AUTO: 94.8 FL (ref 82.6–102.9)
MUCUS: ABNORMAL
NITRITE, URINE: NEGATIVE
NRBC AUTOMATED: 0 PER 100 WBC
OTHER OBSERVATIONS UA: ABNORMAL
PARTIAL THROMBOPLASTIN TIME: 24.5 SEC (ref 20.5–30.5)
PDW BLD-RTO: 12.9 % (ref 11.8–14.4)
PH UA: 7 (ref 5–8)
PLATELET # BLD: 263 K/UL (ref 138–453)
PMV BLD AUTO: 9.9 FL (ref 8.1–13.5)
POTASSIUM SERPL-SCNC: 4.2 MMOL/L (ref 3.7–5.3)
PROTEIN UA: ABNORMAL
PROTHROMBIN TIME: 10.4 SEC (ref 9–12)
RBC # BLD: 3.85 M/UL (ref 4.21–5.77)
RBC UA: ABNORMAL /HPF (ref 0–2)
RENAL EPITHELIAL, UA: ABNORMAL /HPF
SODIUM BLD-SCNC: 139 MMOL/L (ref 135–144)
SPECIFIC GRAVITY UA: 1.02 (ref 1–1.03)
TRICHOMONAS: ABNORMAL
TURBIDITY: CLEAR
URINE HGB: ABNORMAL
UROBILINOGEN, URINE: NORMAL
WBC # BLD: 6.9 K/UL (ref 3.5–11.3)
WBC UA: ABNORMAL /HPF (ref 0–5)
YEAST: ABNORMAL

## 2018-09-23 PROCEDURE — 80048 BASIC METABOLIC PNL TOTAL CA: CPT

## 2018-09-23 PROCEDURE — 96376 TX/PRO/DX INJ SAME DRUG ADON: CPT

## 2018-09-23 PROCEDURE — 85730 THROMBOPLASTIN TIME PARTIAL: CPT

## 2018-09-23 PROCEDURE — 99284 EMERGENCY DEPT VISIT MOD MDM: CPT

## 2018-09-23 PROCEDURE — 54220 IRRG CRPRA CAVRNOSA PRIAPISM: CPT

## 2018-09-23 PROCEDURE — 96365 THER/PROPH/DIAG IV INF INIT: CPT

## 2018-09-23 PROCEDURE — 6370000000 HC RX 637 (ALT 250 FOR IP): Performed by: STUDENT IN AN ORGANIZED HEALTH CARE EDUCATION/TRAINING PROGRAM

## 2018-09-23 PROCEDURE — 96375 TX/PRO/DX INJ NEW DRUG ADDON: CPT

## 2018-09-23 PROCEDURE — 81001 URINALYSIS AUTO W/SCOPE: CPT

## 2018-09-23 PROCEDURE — 6360000002 HC RX W HCPCS: Performed by: STUDENT IN AN ORGANIZED HEALTH CARE EDUCATION/TRAINING PROGRAM

## 2018-09-23 PROCEDURE — 2580000003 HC RX 258: Performed by: STUDENT IN AN ORGANIZED HEALTH CARE EDUCATION/TRAINING PROGRAM

## 2018-09-23 PROCEDURE — 85610 PROTHROMBIN TIME: CPT

## 2018-09-23 PROCEDURE — 85027 COMPLETE CBC AUTOMATED: CPT

## 2018-09-23 RX ORDER — HYDRALAZINE HYDROCHLORIDE 20 MG/ML
5 INJECTION INTRAMUSCULAR; INTRAVENOUS ONCE
Status: COMPLETED | OUTPATIENT
Start: 2018-09-23 | End: 2018-09-23

## 2018-09-23 RX ORDER — ACETAMINOPHEN 325 MG/1
650 TABLET ORAL ONCE
Status: COMPLETED | OUTPATIENT
Start: 2018-09-23 | End: 2018-09-23

## 2018-09-23 RX ORDER — LIDOCAINE HYDROCHLORIDE 10 MG/ML
20 INJECTION, SOLUTION INFILTRATION; PERINEURAL ONCE
Status: DISCONTINUED | OUTPATIENT
Start: 2018-09-23 | End: 2018-09-23 | Stop reason: HOSPADM

## 2018-09-23 RX ORDER — MORPHINE SULFATE 4 MG/ML
4 INJECTION, SOLUTION INTRAMUSCULAR; INTRAVENOUS PRN
Status: DISCONTINUED | OUTPATIENT
Start: 2018-09-23 | End: 2018-09-23 | Stop reason: HOSPADM

## 2018-09-23 RX ORDER — IBUPROFEN 600 MG/1
600 TABLET ORAL EVERY 6 HOURS PRN
Qty: 30 TABLET | Refills: 0 | Status: SHIPPED | OUTPATIENT
Start: 2018-09-23 | End: 2018-12-27

## 2018-09-23 RX ORDER — MORPHINE SULFATE 4 MG/ML
2 INJECTION, SOLUTION INTRAMUSCULAR; INTRAVENOUS ONCE
Status: COMPLETED | OUTPATIENT
Start: 2018-09-23 | End: 2018-09-23

## 2018-09-23 RX ADMIN — CEFAZOLIN 2 G: 10 INJECTION, POWDER, FOR SOLUTION INTRAVENOUS; PARENTERAL at 19:00

## 2018-09-23 RX ADMIN — MORPHINE SULFATE 4 MG: 4 INJECTION INTRAVENOUS at 17:02

## 2018-09-23 RX ADMIN — ACETAMINOPHEN 650 MG: 325 TABLET ORAL at 15:35

## 2018-09-23 RX ADMIN — HYDRALAZINE HYDROCHLORIDE 5 MG: 20 INJECTION INTRAMUSCULAR; INTRAVENOUS at 18:05

## 2018-09-23 RX ADMIN — MORPHINE SULFATE 2 MG: 4 INJECTION INTRAVENOUS at 15:35

## 2018-09-23 RX ADMIN — PHENYLEPHRINE HYDROCHLORIDE: 10 INJECTION INTRAVENOUS at 17:45

## 2018-09-23 ASSESSMENT — PAIN SCALES - GENERAL
PAINLEVEL_OUTOF10: 7

## 2018-09-23 ASSESSMENT — PAIN DESCRIPTION - FREQUENCY: FREQUENCY: CONTINUOUS

## 2018-09-23 ASSESSMENT — PAIN DESCRIPTION - PAIN TYPE: TYPE: ACUTE PAIN

## 2018-09-23 ASSESSMENT — ENCOUNTER SYMPTOMS
SHORTNESS OF BREATH: 0
CHEST TIGHTNESS: 0
DIARRHEA: 0
COUGH: 0
COLOR CHANGE: 0
VOMITING: 0
CONSTIPATION: 0
NAUSEA: 0
ABDOMINAL PAIN: 0
EYE PAIN: 0
RHINORRHEA: 0
BACK PAIN: 0
EYE DISCHARGE: 0
SORE THROAT: 0
BLOOD IN STOOL: 0

## 2018-09-23 ASSESSMENT — PAIN DESCRIPTION - LOCATION: LOCATION: PENIS

## 2018-09-23 ASSESSMENT — PAIN DESCRIPTION - DESCRIPTORS: DESCRIPTORS: SORE

## 2018-09-23 NOTE — ED PROVIDER NOTES
I have seen and examined the patient concurrently at the bedside with the Resident/MLP. Please see my key portion documented:      I agree with the assessment and plan as discussed with Dr. Clarissa Reyes. Patient is well-appearing and does not seem to be in any acute distress. He does have an erection but he states has been ongoing since 7 PM last night. There is not evidence of edema or external signs of ischemia/necrosis. The tissue looks well. We'll consult with urology right away given the reported duration. Will require aspiration, phenylephrine injection, etc.    Electronically signed:  NIKITA Klein MD  09/23/18 2248

## 2018-09-23 NOTE — PROCEDURES
[address] [facility]. [place]    DATE: 9/23/2018       SURGEON:   Christo Pratt MD    ASSISTANT:  Nohemy Cary     PREOPERATIVE DIAGNOSIS:  Priapism    POSTOPERATIVE DIAGNOSIS:  same    PROCEDURE PERFORMED:  Irrigation with normal saline & phenylephrine    ANESTHESIA:  Local anesthesia    COMPLICATIONS:  None. ESTIMATED BLOOD LOSS:  Min less than 5 mL    SPECIMENS:  none      DRAINS:  None. INDICATIONS FOR PROCEDURE:  The patient is a 77 y.o. male with priapism. I discussed with him the risks of erectile dysfunction if we do not irrigate he understood. Also discussed with him the risks of bleeding infection and damage to penis as well as need for additional surgery The risks and benefits of the procedure as well as possible alternatives and complications were discussed and he consented    DETAILS OF THE PROCEDURE:  The patient was correctly identified at the bedside. After discussing the risks benefits alternatives procedure we proceeded prepped and draped in the standard sterile surgical fashion. Then used buvipicaine 6 mL to numb the penis and dorsal penile block as well as subcutaneous block. This was adequate the patient had cardiopulmonary monitoring on. A 20-gauge needle was inserted into the penis copious amounts of sterile saline were irrigated in as well as blood irrigated out of the corpora cavernosa. The needle was in the right mid shaft and inserted into the corpora. Did this multiple times irrigating out several cc of clot. Then injected 100 µg of phenylephrine and 1 mL solution. I repeated the process of injecting sterile saline and irrigating out blood for 5 minutes and then repeated the phenylephrine injection for a total of 5 times. By this point the patient's penis was detumesced. I did give him 15 minutes and checked on him and then removed the needle. The patient tolerated the procedure well he did get Ancef in the perioperative period.  Dr. Alexia Ramirez was

## 2018-09-23 NOTE — ED PROVIDER NOTES
[facility]  ED  Emergency Department Encounter  Emergency Medicine Resident     Pt Name: Rosaline Lares  MRN: [id number]  [place] 1950  Date of evaluation: 9/23/18  PCP:  Dexter Ivory MD    CHIEF COMPLAINT       Chief Complaint   Patient presents with    Male  Problem     priaprism since last night       HISTORY OF PRESENT ILLNESS  (Location/Symptom, Timing/Onset, Context/Setting, Quality, Duration, Modifying Factors, Severity.)      Rosaline Lares is a 77 y.o. male who presents with Erection since last night. It came on suddenly when he was watching football and was constant. It was still there when he woke up from bed and has persisted since. His pain is gradually increasing. It is sharp throbbing sensation. Radiates to the testicles. He has not taken any Viagra or erectile medications he denies any changes in medication denies cocaine and amphetamine use. He admits to drinking a sixpack of beer daily for the last 12 years. He also smokes a pack a day. Denies any history of clotting disorders or sickle cell disease. His only medications consist of nitroglycerin, aspirin, Crestor, simvastatin. PAST MEDICAL / SURGICAL / SOCIAL / FAMILY HISTORY      has a past medical history of CAD (coronary artery disease); Hyperlipidemia; and Hypertension. has a past surgical history that includes Ankle surgery; Percutaneous Transluminal Coronary Angio (2014); and Colonoscopy. Social History     Social History    Marital status: Single     Spouse name: N/A    Number of children: N/A    Years of education: N/A     Occupational History    Not on file.      Social History Main Topics    Smoking status: Former Smoker     Quit date: 1/12/1995    Smokeless tobacco: Never Used    Alcohol use Yes      Comment: 2x a week     Drug use: No    Sexual activity: Not on file     Other Topics Concern    Not on file     Social History Narrative    No narrative on file       History note    FINAL IMPRESSION      1.  Priapism          DISPOSITION / PLAN     DISPOSITION Decision To Discharge 09/23/2018 08:15:17 PM      PATIENT REFERRED TO:  MD Jesús [place] [address]. [address] [place] [address]  [phone]    Schedule an appointment as soon as possible for a visit in 2 days  Return to the ER if worsening or any other concern      DISCHARGE MEDICATIONS:  Discharge Medication List as of 9/23/2018  8:16 PM      START taking these medications    Details   ibuprofen (ADVIL;MOTRIN) 600 MG tablet Take 1 tablet by mouth every 6 hours as needed for Pain, Disp-30 tablet, R-0Print             Lorenzo Childs DO  Emergency Medicine Resident    (Please note that portions of this note were completed with a voice recognition program.  Efforts were made to edit the dictations but occasionally words are mis-transcribed.)       Lorenzo Childs DO  Resident  09/23/18 1322

## 2018-09-23 NOTE — ED NOTES
Bed: 44  Expected date:   Expected time:   Means of arrival:   Comments:     Simone Her RN  09/23/18 3381

## 2018-09-23 NOTE — H&P
Urology Consultation    Patient:  Wendee Opitz  MRN: [id number]  YOB: 1950    CHIEF COMPLAINT:  Priapism    HISTORY OF PRESENT ILLNESS:   The patient is a 77 y.o. male who presents with I's and that started yesterday around 8 PM. The patient had previously had sex in the erection had gone down. He woke up with a painful erection that never had detumesced. He has never had this happen before. He sought medical attention this afternoon. The urology service was consulted. He denies sickle cell trait. He denies using erectile dysfunction medications such as Viagra other phosphodiesterase inhibitors, cocaine, penile injections. Patient reports having adequate erections for sexual encounters on almost 10 out of 10 times. He describes no firmness for penetration. The patient stated the direction was extremely painful. He is not having shortness of breath or chest pain or nausea. The patient is never seen a urologist before, he denies urinary tract infections difficulty with his urinary stream blood in his urine, bladder tumors, prostate cancer, renal cancer. He does not have a family history of sickle cell anemia or sickle cell trait. Patient's old records, notes and chart reviewed and summarized above. Past Medical History:    Past Medical History:   Diagnosis Date    CAD (coronary artery disease)     Hyperlipidemia     Hypertension        Past Surgical History:    Past Surgical History:   Procedure Laterality Date    ANKLE SURGERY      bilateral, pt states over 2 years ago    COLONOSCOPY      PTCA  2014     Previous  surgery: none     Medications:    Scheduled Meds:   lidocaine  20 mL Intradermal Once    phenylephrine   Intracavernosal Once     Continuous Infusions:  PRN Meds:.morphine    Allergies:  Patient has no known allergies.     Social History:    Social History     Social History    Marital status: Single     Spouse name: N/A    Number of children: N/A    Years of education:

## 2018-11-30 ENCOUNTER — APPOINTMENT (OUTPATIENT)
Dept: CT IMAGING | Age: 68
End: 2018-11-30
Payer: MEDICARE

## 2018-11-30 ENCOUNTER — HOSPITAL ENCOUNTER (EMERGENCY)
Age: 68
Discharge: HOME | End: 2018-11-30
Attending: EMERGENCY MEDICINE
Payer: MEDICARE

## 2018-11-30 VITALS
DIASTOLIC BLOOD PRESSURE: 93 MMHG | HEART RATE: 75 BPM | BODY MASS INDEX: 21.17 KG/M2 | OXYGEN SATURATION: 98 % | RESPIRATION RATE: 16 BRPM | TEMPERATURE: 97 F | HEIGHT: 74 IN | SYSTOLIC BLOOD PRESSURE: 161 MMHG | WEIGHT: 165 LBS

## 2018-11-30 DIAGNOSIS — K59.00 CONSTIPATION, UNSPECIFIED CONSTIPATION TYPE: Primary | ICD-10-CM

## 2018-11-30 LAB
-: NORMAL
ABSOLUTE EOS #: 0.29 K/UL (ref 0–0.44)
ABSOLUTE IMMATURE GRANULOCYTE: <0.03 K/UL (ref 0–0.3)
ABSOLUTE LYMPH #: 2.44 K/UL (ref 1.1–3.7)
ABSOLUTE MONO #: 0.56 K/UL (ref 0.1–1.2)
ALBUMIN SERPL-MCNC: 4.2 G/DL (ref 3.5–5.2)
ALBUMIN/GLOBULIN RATIO: 1.3 (ref 1–2.5)
ALP BLD-CCNC: 77 U/L (ref 40–129)
ALT SERPL-CCNC: 19 U/L (ref 5–41)
AMORPHOUS: NORMAL
ANION GAP SERPL CALCULATED.3IONS-SCNC: 11 MMOL/L (ref 9–17)
AST SERPL-CCNC: 27 U/L
BACTERIA: NORMAL
BASOPHILS # BLD: 1 % (ref 0–2)
BASOPHILS ABSOLUTE: 0.04 K/UL (ref 0–0.2)
BILIRUB SERPL-MCNC: 0.66 MG/DL (ref 0.3–1.2)
BILIRUBIN URINE: NEGATIVE
BUN BLDV-MCNC: 15 MG/DL (ref 8–23)
BUN/CREAT BLD: NORMAL (ref 9–20)
CALCIUM SERPL-MCNC: 9.2 MG/DL (ref 8.6–10.4)
CASTS UA: NORMAL /LPF (ref 0–8)
CHLORIDE BLD-SCNC: 104 MMOL/L (ref 98–107)
CO2: 27 MMOL/L (ref 20–31)
COLOR: YELLOW
COMMENT UA: ABNORMAL
CREAT SERPL-MCNC: 0.86 MG/DL (ref 0.7–1.2)
CRYSTALS, UA: NORMAL /HPF
DIFFERENTIAL TYPE: ABNORMAL
EOSINOPHILS RELATIVE PERCENT: 5 % (ref 1–4)
EPITHELIAL CELLS UA: NORMAL /HPF (ref 0–5)
GFR AFRICAN AMERICAN: >60 ML/MIN
GFR NON-AFRICAN AMERICAN: >60 ML/MIN
GFR SERPL CREATININE-BSD FRML MDRD: NORMAL ML/MIN/{1.73_M2}
GFR SERPL CREATININE-BSD FRML MDRD: NORMAL ML/MIN/{1.73_M2}
GLUCOSE BLD-MCNC: 96 MG/DL (ref 70–99)
GLUCOSE URINE: NEGATIVE
HCT VFR BLD CALC: 39.5 % (ref 40.7–50.3)
HEMOGLOBIN: 13.5 G/DL (ref 13–17)
IMMATURE GRANULOCYTES: 0 %
KETONES, URINE: NEGATIVE
LEUKOCYTE ESTERASE, URINE: NEGATIVE
LIPASE: 31 U/L (ref 13–60)
LYMPHOCYTES # BLD: 39 % (ref 24–43)
MCH RBC QN AUTO: 32.6 PG (ref 25.2–33.5)
MCHC RBC AUTO-ENTMCNC: 34.2 G/DL (ref 28.4–34.8)
MCV RBC AUTO: 95.4 FL (ref 82.6–102.9)
MONOCYTES # BLD: 9 % (ref 3–12)
MUCUS: NORMAL
NITRITE, URINE: NEGATIVE
NRBC AUTOMATED: 0 PER 100 WBC
OTHER OBSERVATIONS UA: NORMAL
PDW BLD-RTO: 12.3 % (ref 11.8–14.4)
PH UA: 6 (ref 5–8)
PLATELET # BLD: 259 K/UL (ref 138–453)
PLATELET ESTIMATE: ABNORMAL
PMV BLD AUTO: 10.7 FL (ref 8.1–13.5)
POTASSIUM SERPL-SCNC: 3.8 MMOL/L (ref 3.7–5.3)
PROTEIN UA: ABNORMAL
RBC # BLD: 4.14 M/UL (ref 4.21–5.77)
RBC # BLD: ABNORMAL 10*6/UL
RBC UA: NORMAL /HPF (ref 0–4)
RENAL EPITHELIAL, UA: NORMAL /HPF
SEG NEUTROPHILS: 46 % (ref 36–65)
SEGMENTED NEUTROPHILS ABSOLUTE COUNT: 3 K/UL (ref 1.5–8.1)
SODIUM BLD-SCNC: 142 MMOL/L (ref 135–144)
SPECIFIC GRAVITY UA: 1.03 (ref 1–1.03)
TOTAL PROTEIN: 7.4 G/DL (ref 6.4–8.3)
TRICHOMONAS: NORMAL
TURBIDITY: CLEAR
URINE HGB: NEGATIVE
UROBILINOGEN, URINE: NORMAL
WBC # BLD: 6.3 K/UL (ref 3.5–11.3)
WBC # BLD: ABNORMAL 10*3/UL
WBC UA: NORMAL /HPF (ref 0–5)
YEAST: NORMAL

## 2018-11-30 PROCEDURE — 6360000004 HC RX CONTRAST MEDICATION: Performed by: EMERGENCY MEDICINE

## 2018-11-30 PROCEDURE — 6360000002 HC RX W HCPCS: Performed by: EMERGENCY MEDICINE

## 2018-11-30 PROCEDURE — 81001 URINALYSIS AUTO W/SCOPE: CPT

## 2018-11-30 PROCEDURE — 99284 EMERGENCY DEPT VISIT MOD MDM: CPT

## 2018-11-30 PROCEDURE — 74177 CT ABD & PELVIS W/CONTRAST: CPT

## 2018-11-30 PROCEDURE — 85025 COMPLETE CBC W/AUTO DIFF WBC: CPT

## 2018-11-30 PROCEDURE — 83690 ASSAY OF LIPASE: CPT

## 2018-11-30 PROCEDURE — 96374 THER/PROPH/DIAG INJ IV PUSH: CPT

## 2018-11-30 PROCEDURE — 80053 COMPREHEN METABOLIC PANEL: CPT

## 2018-11-30 RX ORDER — KETOROLAC TROMETHAMINE 30 MG/ML
30 INJECTION, SOLUTION INTRAMUSCULAR; INTRAVENOUS ONCE
Status: COMPLETED | OUTPATIENT
Start: 2018-11-30 | End: 2018-11-30

## 2018-11-30 RX ORDER — DOCUSATE SODIUM 100 MG/1
100 CAPSULE, LIQUID FILLED ORAL 2 TIMES DAILY
Qty: 30 CAPSULE | Refills: 0 | Status: SHIPPED | OUTPATIENT
Start: 2018-11-30 | End: 2019-04-11 | Stop reason: ALTCHOICE

## 2018-11-30 RX ADMIN — IOPAMIDOL 75 ML: 755 INJECTION, SOLUTION INTRAVENOUS at 13:25

## 2018-11-30 RX ADMIN — KETOROLAC TROMETHAMINE 30 MG: 30 INJECTION, SOLUTION INTRAMUSCULAR at 13:46

## 2018-11-30 ASSESSMENT — ENCOUNTER SYMPTOMS
VOMITING: 0
COUGH: 0
DIARRHEA: 0
BLOOD IN STOOL: 0
SORE THROAT: 0
ABDOMINAL PAIN: 1
BACK PAIN: 0
CONSTIPATION: 1
SHORTNESS OF BREATH: 0
NAUSEA: 0

## 2018-11-30 ASSESSMENT — PAIN DESCRIPTION - ORIENTATION
ORIENTATION: LEFT
ORIENTATION: LEFT

## 2018-11-30 ASSESSMENT — PAIN DESCRIPTION - LOCATION
LOCATION: FLANK
LOCATION: FLANK

## 2018-11-30 ASSESSMENT — PAIN DESCRIPTION - PAIN TYPE
TYPE: ACUTE PAIN
TYPE: ACUTE PAIN

## 2018-11-30 ASSESSMENT — PAIN SCALES - GENERAL
PAINLEVEL_OUTOF10: 5
PAINLEVEL_OUTOF10: 10
PAINLEVEL_OUTOF10: 8

## 2018-11-30 ASSESSMENT — PAIN DESCRIPTION - DESCRIPTORS: DESCRIPTORS: SHARP

## 2018-11-30 NOTE — ED NOTES
Pt calls out stating that he is all finished using the bathroom. Noted moderate amount of stool in bedside commode. Pt states he feels better and would like to go home.       Zuhair Ferrera, RN  11/30/18 4286

## 2018-11-30 NOTE — ED PROVIDER NOTES
to face evaluation of this patient and have completed at least one if not all key elements of the E/M (history, physical exam, and MDM). Additional findings are as noted. For APC cases I have personally evaluated and examined the patient in conjunction with the APC and agree with the treatment plan and disposition of the patient as recorded by the APC.     Laura Gamble MD  Attending Emergency  Physician       Akilah Gramajo MD  11/30/18 4308
DEPARTMENT COURSE:  Patient is a 78-year-old male with left-sided flank and abdominal pain and constipation for 6 days. He states he's had a prior stool impaction. Appears well on exam, nontoxic, abdomen soft no tenderness on abdominal exam, no flank tenderness. We'll get CT abdomen and pelvis to rule out obstruction caused by mass or any other acute pathology. Abdominal ultrasound showing abdominal aorta of 1.5 cm in diameter with known AAA. 2:20 PM  Labwork unremarkable, CT abdomen with no acute pathology, does appear to have stool burden, will give enema. Increased abdominal pain, we'll give Toradol. Patient had large bowel movement and feels much better. We'll discharge with Colace and primary care follow-up. Instructed to return if symptoms worsen. PROCEDURES:   Ed Fast Abdomen Limited  Date/Time: 11/30/2018 12:38 PM  Performed by: Julia Treviño  Authorized by: Víctor Jaramillo     Procedure details:     Indications: abdominal pain and flank pain      Assessment for:  AAA    Aorta:  Visualized  Vascular findings: Aorta: aorta normal (< 3cm)      Aorta diameter:  1.5 cm        CONSULTS:  None    CRITICAL CARE:  None    FINAL IMPRESSION      1.  Constipation, unspecified constipation type          DISPOSITION / PLAN     DISPOSITION  Decision to discharge      PATIENT [facility]ED [facility]:  [facility] [address] [place] [address]  [phone]    Schedule an appointment as soon as possible for a visit       [facility]  [address] [facility] [address]  [phone]    If symptoms worsen      DISCHARGE MEDICATIONS:  Discharge Medication List as of 11/30/2018  3:23 PM      START taking these medications    Details   docusate sodium (COLACE) 100 MG capsule Take 1 capsule by mouth 2 times daily, Disp-30 capsule, R-0Print             Kunal Browning MD  Emergency Medicine Resident    (Please note that portions of thisnote were completed with a

## 2018-12-27 ENCOUNTER — APPOINTMENT (OUTPATIENT)
Dept: GENERAL RADIOLOGY | Age: 68
End: 2018-12-27
Payer: MEDICARE

## 2018-12-27 ENCOUNTER — HOSPITAL ENCOUNTER (OUTPATIENT)
Age: 68
Setting detail: OBSERVATION
Discharge: HOME | End: 2018-12-28
Attending: EMERGENCY MEDICINE | Admitting: EMERGENCY MEDICINE
Payer: MEDICARE

## 2018-12-27 DIAGNOSIS — R07.9 CHEST PAIN, UNSPECIFIED TYPE: Primary | ICD-10-CM

## 2018-12-27 LAB
ABSOLUTE EOS #: 0.21 K/UL (ref 0–0.44)
ABSOLUTE IMMATURE GRANULOCYTE: <0.03 K/UL (ref 0–0.3)
ABSOLUTE LYMPH #: 2.01 K/UL (ref 1.1–3.7)
ABSOLUTE MONO #: 0.77 K/UL (ref 0.1–1.2)
ANION GAP SERPL CALCULATED.3IONS-SCNC: 18 MMOL/L (ref 9–17)
BASOPHILS # BLD: 1 % (ref 0–2)
BASOPHILS ABSOLUTE: 0.06 K/UL (ref 0–0.2)
BUN BLDV-MCNC: 17 MG/DL (ref 8–23)
BUN/CREAT BLD: ABNORMAL (ref 9–20)
CALCIUM SERPL-MCNC: 9.1 MG/DL (ref 8.6–10.4)
CHLORIDE BLD-SCNC: 100 MMOL/L (ref 98–107)
CO2: 22 MMOL/L (ref 20–31)
CREAT SERPL-MCNC: 0.82 MG/DL (ref 0.7–1.2)
DIFFERENTIAL TYPE: ABNORMAL
EOSINOPHILS RELATIVE PERCENT: 4 % (ref 1–4)
GFR AFRICAN AMERICAN: >60 ML/MIN
GFR NON-AFRICAN AMERICAN: >60 ML/MIN
GFR SERPL CREATININE-BSD FRML MDRD: ABNORMAL ML/MIN/{1.73_M2}
GFR SERPL CREATININE-BSD FRML MDRD: ABNORMAL ML/MIN/{1.73_M2}
GLUCOSE BLD-MCNC: 70 MG/DL (ref 70–99)
HCT VFR BLD CALC: 39 % (ref 40.7–50.3)
HEMOGLOBIN: 13 G/DL (ref 13–17)
IMMATURE GRANULOCYTES: 0 %
LYMPHOCYTES # BLD: 36 % (ref 24–43)
MCH RBC QN AUTO: 31.6 PG (ref 25.2–33.5)
MCHC RBC AUTO-ENTMCNC: 33.3 G/DL (ref 28.4–34.8)
MCV RBC AUTO: 94.9 FL (ref 82.6–102.9)
MONOCYTES # BLD: 14 % (ref 3–12)
NRBC AUTOMATED: 0 PER 100 WBC
PDW BLD-RTO: 12.5 % (ref 11.8–14.4)
PLATELET # BLD: 233 K/UL (ref 138–453)
PLATELET ESTIMATE: ABNORMAL
PMV BLD AUTO: 9.7 FL (ref 8.1–13.5)
POTASSIUM SERPL-SCNC: 4 MMOL/L (ref 3.7–5.3)
RBC # BLD: 4.11 M/UL (ref 4.21–5.77)
RBC # BLD: ABNORMAL 10*6/UL
SEG NEUTROPHILS: 45 % (ref 36–65)
SEGMENTED NEUTROPHILS ABSOLUTE COUNT: 2.54 K/UL (ref 1.5–8.1)
SODIUM BLD-SCNC: 140 MMOL/L (ref 135–144)
TROPONIN INTERP: NORMAL
TROPONIN T: NORMAL NG/ML
TROPONIN, HIGH SENSITIVITY: 11 NG/L (ref 0–22)
TROPONIN, HIGH SENSITIVITY: 8 NG/L (ref 0–22)
TROPONIN, HIGH SENSITIVITY: 9 NG/L (ref 0–22)
WBC # BLD: 5.6 K/UL (ref 3.5–11.3)
WBC # BLD: ABNORMAL 10*3/UL

## 2018-12-27 PROCEDURE — 93005 ELECTROCARDIOGRAM TRACING: CPT

## 2018-12-27 PROCEDURE — 36415 COLL VENOUS BLD VENIPUNCTURE: CPT

## 2018-12-27 PROCEDURE — G0378 HOSPITAL OBSERVATION PER HR: HCPCS

## 2018-12-27 PROCEDURE — 71046 X-RAY EXAM CHEST 2 VIEWS: CPT

## 2018-12-27 PROCEDURE — 80048 BASIC METABOLIC PNL TOTAL CA: CPT

## 2018-12-27 PROCEDURE — 84484 ASSAY OF TROPONIN QUANT: CPT

## 2018-12-27 PROCEDURE — 85025 COMPLETE CBC W/AUTO DIFF WBC: CPT

## 2018-12-27 PROCEDURE — 6370000000 HC RX 637 (ALT 250 FOR IP): Performed by: NURSE PRACTITIONER

## 2018-12-27 PROCEDURE — 2580000003 HC RX 258: Performed by: NURSE PRACTITIONER

## 2018-12-27 PROCEDURE — 99285 EMERGENCY DEPT VISIT HI MDM: CPT

## 2018-12-27 RX ORDER — ASPIRIN 81 MG/1
81 TABLET ORAL DAILY
Status: DISCONTINUED | OUTPATIENT
Start: 2018-12-27 | End: 2018-12-28 | Stop reason: HOSPADM

## 2018-12-27 RX ORDER — NITROGLYCERIN 0.4 MG/1
0.4 TABLET SUBLINGUAL EVERY 5 MIN PRN
Status: DISCONTINUED | OUTPATIENT
Start: 2018-12-27 | End: 2018-12-28 | Stop reason: HOSPADM

## 2018-12-27 RX ORDER — ACETAMINOPHEN 325 MG/1
650 TABLET ORAL EVERY 4 HOURS PRN
Status: DISCONTINUED | OUTPATIENT
Start: 2018-12-27 | End: 2018-12-28 | Stop reason: HOSPADM

## 2018-12-27 RX ORDER — CARVEDILOL 6.25 MG/1
6.25 TABLET ORAL 2 TIMES DAILY WITH MEALS
Status: DISCONTINUED | OUTPATIENT
Start: 2018-12-27 | End: 2018-12-28 | Stop reason: HOSPADM

## 2018-12-27 RX ORDER — HYDROCODONE BITARTRATE AND ACETAMINOPHEN 5; 325 MG/1; MG/1
2 TABLET ORAL EVERY 4 HOURS PRN
Status: DISCONTINUED | OUTPATIENT
Start: 2018-12-27 | End: 2018-12-28 | Stop reason: HOSPADM

## 2018-12-27 RX ORDER — ROSUVASTATIN CALCIUM 20 MG/1
20 TABLET, COATED ORAL NIGHTLY
Status: DISCONTINUED | OUTPATIENT
Start: 2018-12-27 | End: 2018-12-28 | Stop reason: HOSPADM

## 2018-12-27 RX ORDER — SODIUM CHLORIDE 0.9 % (FLUSH) 0.9 %
10 SYRINGE (ML) INJECTION EVERY 12 HOURS SCHEDULED
Status: DISCONTINUED | OUTPATIENT
Start: 2018-12-27 | End: 2018-12-28 | Stop reason: HOSPADM

## 2018-12-27 RX ORDER — ONDANSETRON 4 MG/1
4 TABLET, ORALLY DISINTEGRATING ORAL EVERY 8 HOURS PRN
Status: DISCONTINUED | OUTPATIENT
Start: 2018-12-27 | End: 2018-12-28 | Stop reason: HOSPADM

## 2018-12-27 RX ORDER — HYDROCODONE BITARTRATE AND ACETAMINOPHEN 5; 325 MG/1; MG/1
1 TABLET ORAL EVERY 4 HOURS PRN
Status: DISCONTINUED | OUTPATIENT
Start: 2018-12-27 | End: 2018-12-28 | Stop reason: HOSPADM

## 2018-12-27 RX ORDER — SODIUM CHLORIDE 0.9 % (FLUSH) 0.9 %
10 SYRINGE (ML) INJECTION PRN
Status: DISCONTINUED | OUTPATIENT
Start: 2018-12-27 | End: 2018-12-28 | Stop reason: HOSPADM

## 2018-12-27 RX ORDER — LISINOPRIL 10 MG/1
10 TABLET ORAL DAILY
Status: DISCONTINUED | OUTPATIENT
Start: 2018-12-27 | End: 2018-12-28 | Stop reason: HOSPADM

## 2018-12-27 RX ADMIN — Medication 10 ML: at 21:03

## 2018-12-27 RX ADMIN — CARVEDILOL 6.25 MG: 6.25 TABLET, FILM COATED ORAL at 16:14

## 2018-12-27 RX ADMIN — ROSUVASTATIN CALCIUM 20 MG: 20 TABLET, FILM COATED ORAL at 21:03

## 2018-12-27 RX ADMIN — TICAGRELOR 90 MG: 90 TABLET ORAL at 21:03

## 2018-12-27 ASSESSMENT — ENCOUNTER SYMPTOMS
RHINORRHEA: 0
BLOOD IN STOOL: 0
SHORTNESS OF BREATH: 0
DIARRHEA: 0
SINUS PAIN: 0
WHEEZING: 0
EYE PAIN: 0
COUGH: 0
EYE REDNESS: 0
COUGH: 1
NAUSEA: 0
ABDOMINAL PAIN: 0
VOMITING: 0
APNEA: 0

## 2018-12-27 ASSESSMENT — PAIN DESCRIPTION - PAIN TYPE: TYPE: ACUTE PAIN

## 2018-12-27 ASSESSMENT — PAIN SCALES - GENERAL: PAINLEVEL_OUTOF10: 6

## 2018-12-27 ASSESSMENT — PAIN DESCRIPTION - ORIENTATION: ORIENTATION: LEFT

## 2018-12-27 ASSESSMENT — PAIN DESCRIPTION - LOCATION: LOCATION: CHEST

## 2018-12-27 ASSESSMENT — PAIN DESCRIPTION - DESCRIPTORS: DESCRIPTORS: ACHING

## 2018-12-27 ASSESSMENT — HEART SCORE: ECG: 1

## 2018-12-27 NOTE — H&P
Neurological: Negative for dizziness, syncope, weakness and headaches. (PQRS) Advance directives on face sheet per hospital policy. No change unless specifically mentioned in chart    PAST MEDICAL HISTORY    has a past medical history of CAD (coronary artery disease); Hyperlipidemia; and Hypertension. I have reviewed the past medical history with the patient and it is pertinent to this complaint. SURGICAL HISTORY      has a past surgical history that includes Ankle surgery; Percutaneous Transluminal Coronary Angio (2014); Colonoscopy; and Coronary angioplasty with stent (2014). I have reviewed and agree with Surgical History entered and it is pertinent to this complaint. CURRENT MEDICATIONS       aspirin EC tablet 81 mg Daily   carvedilol (COREG) tablet 6.25 mg BID WC   lisinopril (PRINIVIL;ZESTRIL) tablet 10 mg Daily   nitroGLYCERIN (NITROSTAT) SL tablet 0.4 mg Q5 Min PRN   rosuvastatin (CRESTOR) tablet 20 mg Nightly   ticagrelor (BRILINTA) tablet 90 mg BID   sodium chloride flush 0.9 % injection 10 mL 2 times per day   sodium chloride flush 0.9 % injection 10 mL PRN   acetaminophen (TYLENOL) tablet 650 mg Q4H PRN   ondansetron (ZOFRAN-ODT) disintegrating tablet 4 mg Q8H PRN       All medication charted and reviewed. ALLERGIES     has No Known Allergies. FAMILY HISTORY     has no family status information on file. family history is not on file. The patient denies any pertinent family history. I have reviewed and agree with the family history entered. I have reviewed the Family History and it is not significant to the case    SOCIAL HISTORY      reports that he quit smoking about 23 years ago. He has never used smokeless tobacco. He reports that he drinks alcohol. He reports that he does not use drugs. I have reviewed and agree with all Social history. There are no concerns for substance of abuse     PHYSICAL EXAM     INITIAL VITALS:  weight is 164 lb (74.4 kg).  His temperature is 97.3 °F (36.3 °C). His blood pressure is 184/90 (abnormal) and his pulse is 70. His respiration is 16 and oxygen saturation is 98%. CONSTITUTIONAL: AOx4, no apparent distress, appears stated age    HEAD: normocephalic, atraumatic   EYES: PERRLA, EOMI    ENT: moist mucous membranes, uvula midline   NECK: supple, symmetric   BACK: symmetric   LUNGS: clear to auscultation bilaterally   CARDIOVASCULAR: regular rate and rhythm, no murmurs, rubs or gallops   ABDOMEN: soft, non-tender, non-distended with normal active bowel sounds   NEUROLOGIC:  MAEx4, no focal sensory or motor deficits   MUSCULOSKELETAL: no clubbing, cyanosis or edema   SKIN: no rash or wounds       DIFFERENTIAL DIAGNOSIS/MDM:   DDx: Emergent: ACS/NSTEMI/STEMI/angina, arrhythmia, trauma, aortic dissection, PE, PNA, pneumothroax, esophageal rupture, tamponade, Cocaine use  Nonemergent: pneumonia, pericarditis, GERD, MSK, Endocarditis, anxiety       DIAGNOSTIC RESULTS     ECG: All ECG's are interpreted by the Observation Physician who either signs or Co-signs this chart in the absence of a cardiologist.    ECG Interpretation  Interpreted by observation physician    Rate 76, normal sinus rhythm, normal axis, no enlargements or low voltage, no ST segment elevation or depression, good R-wave progression, T waves flattened in lead III, otherwise normal EKG. , , . Nondiagnostic ECG. Bill Juarez MD  Observation Resident    RADIOLOGY:   I directly visualized the following  images and reviewed the radiologist interpretations:    Xr Chest Standard (2 Vw)    Result Date: 12/27/2018  EXAMINATION: TWO VIEWS OF THE CHEST 12/27/2018 12:35 pm COMPARISON: 2/19/2010 HISTORY: ORDERING SYSTEM PROVIDED HISTORY: CP TECHNOLOGIST PROVIDED HISTORY: CP FINDINGS: Nodular opacities at the lung bases likely represent nipple shadows or calcified granulomas. Chronic interstitial lung changes without focal consolidation. Cardiomegaly.   No pulmonary imaging  · DISPO: pending consults and clinical improvement    CONSULTS:  None    PROCEDURES:  Not indicated     PATIENT REFERRED TO:    MD Jesús [facility] 28. [address] [facility] [address]  [address] [place] [address] 81  85[facility]  [address] [place] [address]  Roseann Perez MD   Emergency Medicine Resident Physician, PGY-1    This dictation was generated by voice recognition computer software. Although all attempts are made to edit the dictation for accuracy, there may be errors in the transcription that are not intended.

## 2018-12-27 NOTE — DISCHARGE INSTR - COC
Continuity of Care Form    Patient Name: Jesus Maki   :  1950  MRN:  [id number]    Admit date:  2018  Discharge date:  ***    Code Status Order: No Order   Advance Directives:     Admitting Physician:  No admitting provider for patient encounter. PCP: Lesa Bertrand MD    Discharging Nurse: [facility] Unit/Room#: 37/44  Discharging Unit Phone Number: ***    Emergency Contact:   Extended Emergency Contact Information  Primary Emergency Contact:  [facility]  Address: [address], [place] [address] Phone: [phone]  Relation: Brother/Sister    Past Surgical History:  Past Surgical History:   Procedure Laterality Date    ANKLE SURGERY      bilateral, pt states over 2 years ago    COLONOSCOPY      CORONARY ANGIOPLASTY WITH STENT PLACEMENT      PTCA         Immunization History:   Immunization History   Administered Date(s) Administered    Influenza, Quadv, 6 mo and older, IM (Flulaval) 2017       Active Problems:  Patient Active Problem List   Diagnosis Code    Coronary artery disease s/p stent in  by [facility] Nettie Fitzgerald [facility] I25.10    Mixed hyperlipidemia E78.2    Chest pain R07.9       Isolation/Infection:   Isolation          No Isolation            Nurse Assessment:  Last Vital Signs: BP (!) 158/78   Pulse 86   Temp 97.7 °F (36.5 °C) (Oral)   Resp 15   Wt 164 lb (74.4 kg)   SpO2 97%   BMI 21.06 kg/m²     Last documented pain score (0-10 scale): Pain Level: 6  Last Weight:   Wt Readings from Last 1 Encounters:   18 164 lb (74.4 kg)     Mental Status:  {IP PT MENTAL STATUS:85618}    IV Access:  { KUN IV ACCESS:205149736}    Nursing Mobility/ADLs:  Walking   {CHP DME ZGDE:539292845}  Transfer  {CHP DME VRNL:675632997}  Bathing  {CHP DME YRCV:996958742}  Dressing  {CHP DME VJWT:497188133}  Toileting  {CHP DME DRQI:539829994}  Feeding  {CHP DME XLMH:222127945}  Med Admin  {CHP DME ZWPJ:305058561}  Med Delivery   [address] MED Delivery:623535445}    Wound Care Documentation and Therapy:        Elimination:  Continence:   · Bowel: {YES / WM:49812}  · Bladder: {YES / HL:70435}  Urinary Catheter: {Urinary Catheter:806281940}   Colostomy/Ileostomy/Ileal Conduit: {YES / DA:38639}       Date of Last BM: ***  No intake or output data in the 24 hours ending 18 1259  No intake/output data recorded.     Safety Concerns:     [address] Safety Concerns:646141554}    Impairments/Disabilities:      [address] Impairments/Disabilities:423636397}    Nutrition Therapy:  Current Nutrition Therapy:   [address] Diet List:003566995}    Routes of Feeding: {CHP DME Other Feedings:750634299}  Liquids: {Slp liquid thickness:85634}  Daily Fluid Restriction: {CHP DME Yes amt example:157298326}  Last Modified Barium Swallow with Video (Video Swallowing Test): {Done Not Done [facility]:298735510}    Treatments at the Time of Hospital Discharge:   Respiratory Treatments: ***  Oxygen Therapy:  {Therapy; copd oxygen:62064}  Ventilator:    {[facility] Vent ITKT:101225538}    Rehab Therapies: {THERAPEUTIC INTERVENTION:5766054329}  Weight Bearing Status/Restrictions: [address]  Weight Bearin}  Other Medical Equipment (for information only, NOT a DME order):  {EQUIPMENT:133323154}  Other Treatments: ***    Patient's personal belongings (please select all that are sent with patient):  {P DME Belongings:426499661}    RN SIGNATURE:  {Esignature:684071753}    CASE MANAGEMENT/SOCIAL WORK SECTION    Inpatient Status Date: ***    Readmission Risk Assessment Score:  Readmission Risk              Risk of Unplanned Readmission:        0           Discharging to Facility/ Agency   · Name: [facility]  · Address:  · Phone:[phone]  · Fax:[phone]    Dialysis Facility (if applicable)   · Name:  · Address:  · Dialysis Schedule:  · Phone:  · Fax:    / signature: Electronically signed by Jasmeet Sethi RN on

## 2018-12-27 NOTE — ED PROVIDER NOTES
headaches. Negative for weakness. PHYSICALEXAM   (upto 7 for level 4, 8 or more for level 5)      INITIAL VITALS:  height is 6' 2\" (1.88 m) and weight is 164 lb (74.4 kg). His temperature is 97.3 °F (36.3 °C). His blood pressure is 184/90 (abnormal) and his pulse is 70. His respiration is 16 and oxygen saturation is 98%. Physical Exam   Constitutional: He is oriented to person, place, and time. He appears well-developed and well-nourished. No distress. HENT:   Head: Normocephalic. Eyes: Pupils are equal, round, and reactive to light. Neck: Normal range of motion. Neck supple. Cardiovascular: Normal rate and regular rhythm. Pulmonary/Chest: Effort normal and breath sounds normal. No respiratory distress. Abdominal: Soft. There is no tenderness. Musculoskeletal: Normal range of motion. Neurological: He is alert and oriented to person, place, and time. Skin: Skin is warm and dry. Capillary refill takes less than 2 seconds. Psychiatric: He has a normal mood and affect. His behavior is normal. Judgment and thought content normal.   Nursing note and vitals reviewed.       DIFFERENTIAL  DIAGNOSIS   Chest pain    PLAN (LABS / IMAGING / EKG):  Orders Placed This Encounter   Procedures    XR CHEST STANDARD (2 VW)    Troponin    CBC Auto Differential    Basic Metabolic Panel    Troponin    DIET CARDIAC;    Telemetry monitoring    Vital signs per unit routine    Notify physician    Notify physician    Up as tolerated    Place intermittent pneumatic compression device    Full Code    EKG 12 Lead    EKG 12 Lead    Insert peripheral IV    PATIENT STATUS (FROM ED OR OR/PROCEDURAL) Observation       MEDICATIONS ORDERED:  Orders Placed This Encounter   Medications    aspirin EC tablet 81 mg    carvedilol (COREG) tablet 6.25 mg    lisinopril (PRINIVIL;ZESTRIL) tablet 10 mg    nitroGLYCERIN (NITROSTAT) SL tablet 0.4 mg    rosuvastatin (CRESTOR) tablet 20 mg    ticagrelor (BRILINTA)

## 2018-12-28 ENCOUNTER — APPOINTMENT (OUTPATIENT)
Dept: NUCLEAR MEDICINE | Age: 68
End: 2018-12-28
Payer: MEDICARE

## 2018-12-28 VITALS
RESPIRATION RATE: 15 BRPM | HEART RATE: 86 BPM | WEIGHT: 157.6 LBS | BODY MASS INDEX: 20.23 KG/M2 | DIASTOLIC BLOOD PRESSURE: 83 MMHG | TEMPERATURE: 98.6 F | OXYGEN SATURATION: 96 % | SYSTOLIC BLOOD PRESSURE: 129 MMHG | HEIGHT: 74 IN

## 2018-12-28 LAB
EKG ATRIAL RATE: 76 BPM
EKG P AXIS: 74 DEGREES
EKG P-R INTERVAL: 188 MS
EKG Q-T INTERVAL: 398 MS
EKG QRS DURATION: 110 MS
EKG QTC CALCULATION (BAZETT): 447 MS
EKG R AXIS: 8 DEGREES
EKG T AXIS: 40 DEGREES
EKG VENTRICULAR RATE: 76 BPM
LV EF: 61 %
LVEF MODALITY: NORMAL
TROPONIN INTERP: NORMAL
TROPONIN T: NORMAL NG/ML
TROPONIN, HIGH SENSITIVITY: 8 NG/L (ref 0–22)

## 2018-12-28 PROCEDURE — 84484 ASSAY OF TROPONIN QUANT: CPT

## 2018-12-28 PROCEDURE — 93017 CV STRESS TEST TRACING ONLY: CPT | Performed by: NURSE PRACTITIONER

## 2018-12-28 PROCEDURE — 3430000000 HC RX DIAGNOSTIC RADIOPHARMACEUTICAL: Performed by: INTERNAL MEDICINE

## 2018-12-28 PROCEDURE — 2580000003 HC RX 258: Performed by: INTERNAL MEDICINE

## 2018-12-28 PROCEDURE — 36415 COLL VENOUS BLD VENIPUNCTURE: CPT

## 2018-12-28 PROCEDURE — 78452 HT MUSCLE IMAGE SPECT MULT: CPT

## 2018-12-28 PROCEDURE — 6360000002 HC RX W HCPCS: Performed by: NURSE PRACTITIONER

## 2018-12-28 PROCEDURE — G0378 HOSPITAL OBSERVATION PER HR: HCPCS

## 2018-12-28 PROCEDURE — A9500 TC99M SESTAMIBI: HCPCS | Performed by: INTERNAL MEDICINE

## 2018-12-28 PROCEDURE — 6360000002 HC RX W HCPCS: Performed by: INTERNAL MEDICINE

## 2018-12-28 RX ORDER — SODIUM CHLORIDE 0.9 % (FLUSH) 0.9 %
10 SYRINGE (ML) INJECTION PRN
Status: DISCONTINUED | OUTPATIENT
Start: 2018-12-28 | End: 2018-12-28

## 2018-12-28 RX ORDER — METOPROLOL TARTRATE 5 MG/5ML
2.5 INJECTION INTRAVENOUS PRN
Status: DISCONTINUED | OUTPATIENT
Start: 2018-12-28 | End: 2018-12-28

## 2018-12-28 RX ORDER — AMINOPHYLLINE DIHYDRATE 25 MG/ML
100 INJECTION, SOLUTION INTRAVENOUS
Status: DISCONTINUED | OUTPATIENT
Start: 2018-12-28 | End: 2018-12-28

## 2018-12-28 RX ORDER — SODIUM CHLORIDE 9 MG/ML
INJECTION, SOLUTION INTRAVENOUS ONCE
Status: DISCONTINUED | OUTPATIENT
Start: 2018-12-28 | End: 2018-12-28

## 2018-12-28 RX ORDER — SODIUM CHLORIDE 0.9 % (FLUSH) 0.9 %
10 SYRINGE (ML) INJECTION PRN
Status: DISCONTINUED | OUTPATIENT
Start: 2018-12-28 | End: 2018-12-28 | Stop reason: HOSPADM

## 2018-12-28 RX ADMIN — ONDANSETRON 4 MG: 4 TABLET, ORALLY DISINTEGRATING ORAL at 14:57

## 2018-12-28 RX ADMIN — REGADENOSON 0.4 MG: 0.08 INJECTION, SOLUTION INTRAVENOUS at 11:17

## 2018-12-28 RX ADMIN — Medication 10 ML: at 11:15

## 2018-12-28 RX ADMIN — TETRAKIS(2-METHOXYISOBUTYLISOCYANIDE)COPPER(I) TETRAFLUOROBORATE 33 MILLICURIE: 1 INJECTION, POWDER, LYOPHILIZED, FOR SOLUTION INTRAVENOUS at 11:16

## 2018-12-28 RX ADMIN — TETRAKIS(2-METHOXYISOBUTYLISOCYANIDE)COPPER(I) TETRAFLUOROBORATE 12.6 MILLICURIE: 1 INJECTION, POWDER, LYOPHILIZED, FOR SOLUTION INTRAVENOUS at 13:46

## 2018-12-28 RX ADMIN — SODIUM CHLORIDE, PRESERVATIVE FREE 10 ML: 5 INJECTION INTRAVENOUS at 09:50

## 2018-12-28 RX ADMIN — SODIUM CHLORIDE, PRESERVATIVE FREE 10 ML: 5 INJECTION INTRAVENOUS at 11:16

## 2018-12-28 NOTE — DISCHARGE SUMMARY
Result Value Ref Range    Troponin, High Sensitivity 8 0 - 22 ng/L    Troponin T NOT REPORTED <0.03 ng/mL    Troponin Interp NOT REPORTED    Troponin   Result Value Ref Range    Troponin, High Sensitivity 8 0 - 22 ng/L    Troponin T NOT REPORTED <0.03 ng/mL    Troponin Interp NOT REPORTED    EKG 12 Lead   Result Value Ref Range    Ventricular Rate 76 BPM    Atrial Rate 76 BPM    P-R Interval 188 ms    QRS Duration 110 ms    Q-T Interval 398 ms    QTc Calculation (Bazett) 447 ms    P Axis 74 degrees    R Axis 8 degrees    T Axis 40 degrees     Xr Chest Standard (2 Vw)    Result Date: 12/27/2018  EXAMINATION: TWO VIEWS OF THE CHEST 12/27/2018 12:35 pm COMPARISON: 2/19/2010 HISTORY: ORDERING SYSTEM PROVIDED HISTORY: CP TECHNOLOGIST PROVIDED HISTORY: CP FINDINGS: Nodular opacities at the lung bases likely represent nipple shadows or calcified granulomas. Chronic interstitial lung changes without focal consolidation. Cardiomegaly. No pulmonary edema. No acute findings. Ct Abdomen Pelvis W Iv Contrast Additional Contrast? None    Result Date: 11/30/2018  EXAMINATION: CT OF THE ABDOMEN AND PELVIS WITH CONTRAST 11/30/2018 12:18 pm TECHNIQUE: CT of the abdomen and pelvis was performed with the administration of intravenous contrast. Multiplanar reformatted images are provided for review. Dose modulation, iterative reconstruction, and/or weight based adjustment of the mA/kV was utilized to reduce the radiation dose to as low as reasonably achievable. COMPARISON: None. HISTORY: ORDERING SYSTEM PROVIDED HISTORY: no bowel movement 6 days, prior stool impaction, r/o cancer TECHNOLOGIST PROVIDED HISTORY: FINDINGS: Cardiovascular: Heavy infrarenal atherosclerosis Lower Chest: Thinning involving the left ventricular apex could represent small aneurysm from prior infarction. Right coronary artery stenting has been performed.  Organs Liver:  Small flash filling hemangioma versus area of shunt vascularity in the right liver attempts are made to edit the dictation for accuracy, there may be errors in the transcription that are not intended.

## 2018-12-28 NOTE — CONSULTS
[facility] Cardiology    Consult               Today's Date: 12/28/2018  Patient Name: Romeo Lerma  Date of admission: 12/27/2018 11:35 AM  Patient's age: 77 y.o., 1950  Admission Dx: Chest pain [R07.9]    Reason for Consult:  Cardiac evaluation    Requesting Physician: Bri Davis MD    CHIEF COMPLAINT:  Chest Pain    History Obtained From:  patient, electronic medical record    HISTORY OF PRESENT ILLNESS:      The patient is a 77 y.o. male who is admitted to the hospital for chest pain. Intermitted LT sided chest pain. One of these episodes were associated with diaphoresis. Occurring over the last 3 days. Denies any dyspnea, palpitations, lightheadedness, dizziness, PND or orthopnea. Past Medical History:   has a past medical history of CAD (coronary artery disease); Hyperlipidemia; and Hypertension. Past Surgical History:   has a past surgical history that includes Ankle surgery; Percutaneous Transluminal Coronary Angio (2014); Colonoscopy; and Coronary angioplasty with stent (2014). Home Medications:    Prior to Admission medications    Medication Sig Start Date End Date Taking?  Authorizing Provider   ticagrelor (BRILINTA) 90 MG TABS tablet Take 90 mg by mouth 2 times daily   Yes Historical Provider, MD   docusate sodium (COLACE) 100 MG capsule Take 1 capsule by mouth 2 times daily 11/30/18  Yes Kunal Browning MD   lisinopril (PRINIVIL;ZESTRIL) 10 MG tablet take 1 tablet by mouth once daily 8/9/18  Yes Yazan Cobb MD   aspirin EC 81 MG EC tablet Take 1 tablet by mouth daily 8/9/18  Yes Yazan Cobb MD   carvedilol (COREG) 6.25 MG tablet take 1 tablet by mouth twice a day with meals 8/9/18  Yes Yazan Cobb MD   rosuvastatin (CRESTOR) 20 MG tablet Take 1 tablet by mouth nightly 8/9/18  Yes Yazan Cobb MD   carboxymethylcellulose (LUBRICANT EYE DROPS) 0.5 % SOLN ophthalmic solution Place 1 drop into both eyes 3 times daily 7/7/17  Yes Jose Enrique     Labs:     CBC:   Recent Labs      12/27/18   1201   WBC  5.6   HGB  13.0   HCT  39.0*   PLT  233     BMP:   Recent Labs      12/27/18   1201   NA  140   K  4.0   CO2  22   BUN  17   CREATININE  0.82   LABGLOM  >60   GLUCOSE  70     BNP: No results for input(s): BNP in the last 72 hours. PT/INR: No results for input(s): PROTIME, INR in the last 72 hours. APTT:No results for input(s): APTT in the last 72 hours. CARDIAC ENZYMES:No results for input(s): CKTOTAL, CKMB, CKMBINDEX, TROPONINI in the last 72 hours. FASTING LIPID PANEL:  Lab Results   Component Value Date    HDL 74 11/20/2017    TRIG 59 11/20/2017     IMPRESSION:    1. Atypical chest pain, intermittent, no signs of ACS   2. CAD with PCI in 2014 by Dr Keisha Chowdary at [facility] (unknown vessel)  3. Dyslipidemia  4. Primary HTN    RECOMMENDATIONS:  1. Will obtain Lexiscan due to ongoing symptoms and h/o CAD  2. If Lexiscan normal, ok to discharge from Cardiology with OP follow up and on medical therapy. Will discuss with rounding attending Dr. Verona Babinski for final recommendations. Reina Booker MD  Cardiology Fellow      Attending Physician Statement  I have discussed the care of Papo Lord, including pertinent history and exam findings,  with the cardiology fellow/resident. I have seen and examined the patient and the key elements of all parts of the encounter have been performed by me.   I agree with the assessment, plan and orders as documented by the fellow.     Albert Reeves [facility]  [address] [facility], [address]   [phone]

## 2019-02-14 ENCOUNTER — OFFICE VISIT (OUTPATIENT)
Dept: INTERNAL MEDICINE | Age: 69
End: 2019-02-14
Payer: MEDICARE

## 2019-02-14 VITALS
HEART RATE: 95 BPM | WEIGHT: 160 LBS | SYSTOLIC BLOOD PRESSURE: 150 MMHG | HEIGHT: 74 IN | DIASTOLIC BLOOD PRESSURE: 97 MMHG | BODY MASS INDEX: 20.53 KG/M2

## 2019-02-14 DIAGNOSIS — E78.2 MIXED HYPERLIPIDEMIA: ICD-10-CM

## 2019-02-14 DIAGNOSIS — I10 ESSENTIAL HYPERTENSION: Primary | ICD-10-CM

## 2019-02-14 DIAGNOSIS — I25.10 CORONARY ARTERY DISEASE INVOLVING NATIVE CORONARY ARTERY OF NATIVE HEART WITHOUT ANGINA PECTORIS: ICD-10-CM

## 2019-02-14 PROCEDURE — G8484 FLU IMMUNIZE NO ADMIN: HCPCS | Performed by: INTERNAL MEDICINE

## 2019-02-14 PROCEDURE — G8420 CALC BMI NORM PARAMETERS: HCPCS | Performed by: INTERNAL MEDICINE

## 2019-02-14 PROCEDURE — 1101F PT FALLS ASSESS-DOCD LE1/YR: CPT | Performed by: INTERNAL MEDICINE

## 2019-02-14 PROCEDURE — 99211 OFF/OP EST MAY X REQ PHY/QHP: CPT | Performed by: INTERNAL MEDICINE

## 2019-02-14 PROCEDURE — 1123F ACP DISCUSS/DSCN MKR DOCD: CPT | Performed by: INTERNAL MEDICINE

## 2019-02-14 PROCEDURE — G8427 DOCREV CUR MEDS BY ELIG CLIN: HCPCS | Performed by: INTERNAL MEDICINE

## 2019-02-14 PROCEDURE — 4040F PNEUMOC VAC/ADMIN/RCVD: CPT | Performed by: INTERNAL MEDICINE

## 2019-02-14 PROCEDURE — 99214 OFFICE O/P EST MOD 30 MIN: CPT | Performed by: INTERNAL MEDICINE

## 2019-02-14 PROCEDURE — 3017F COLORECTAL CA SCREEN DOC REV: CPT | Performed by: INTERNAL MEDICINE

## 2019-02-14 PROCEDURE — G8598 ASA/ANTIPLAT THER USED: HCPCS | Performed by: INTERNAL MEDICINE

## 2019-02-14 PROCEDURE — 1036F TOBACCO NON-USER: CPT | Performed by: INTERNAL MEDICINE

## 2019-02-14 RX ORDER — ROSUVASTATIN CALCIUM 20 MG/1
20 TABLET, COATED ORAL NIGHTLY
Qty: 30 TABLET | Refills: 5 | Status: SHIPPED | OUTPATIENT
Start: 2019-02-14 | End: 2019-07-11 | Stop reason: SDUPTHER

## 2019-02-14 RX ORDER — LISINOPRIL 20 MG/1
TABLET ORAL
Qty: 30 TABLET | Refills: 5 | Status: SHIPPED | OUTPATIENT
Start: 2019-02-14 | End: 2019-07-11 | Stop reason: SDUPTHER

## 2019-02-14 RX ORDER — ASPIRIN 81 MG/1
81 TABLET ORAL DAILY
Qty: 30 TABLET | Refills: 5 | Status: SHIPPED | OUTPATIENT
Start: 2019-02-14 | End: 2019-07-11 | Stop reason: SDUPTHER

## 2019-02-14 RX ORDER — CARVEDILOL 6.25 MG/1
TABLET ORAL
Qty: 60 TABLET | Refills: 5 | Status: SHIPPED | OUTPATIENT
Start: 2019-02-14 | End: 2019-07-11 | Stop reason: SDUPTHER

## 2019-02-14 ASSESSMENT — PATIENT HEALTH QUESTIONNAIRE - PHQ9
SUM OF ALL RESPONSES TO PHQ9 QUESTIONS 1 & 2: 0
1. LITTLE INTEREST OR PLEASURE IN DOING THINGS: 0
2. FEELING DOWN, DEPRESSED OR HOPELESS: 0
SUM OF ALL RESPONSES TO PHQ QUESTIONS 1-9: 0
SUM OF ALL RESPONSES TO PHQ QUESTIONS 1-9: 0

## 2019-04-11 ENCOUNTER — OFFICE VISIT (OUTPATIENT)
Dept: INTERNAL MEDICINE | Age: 69
End: 2019-04-11
Payer: MEDICARE

## 2019-04-11 VITALS
TEMPERATURE: 97.6 F | SYSTOLIC BLOOD PRESSURE: 123 MMHG | OXYGEN SATURATION: 96 % | DIASTOLIC BLOOD PRESSURE: 78 MMHG | BODY MASS INDEX: 21.2 KG/M2 | WEIGHT: 165.2 LBS | HEART RATE: 93 BPM | HEIGHT: 74 IN | RESPIRATION RATE: 20 BRPM

## 2019-04-11 DIAGNOSIS — J30.9 ALLERGIC RHINITIS, UNSPECIFIED SEASONALITY, UNSPECIFIED TRIGGER: ICD-10-CM

## 2019-04-11 DIAGNOSIS — I25.10 CORONARY ARTERY DISEASE INVOLVING NATIVE CORONARY ARTERY OF NATIVE HEART WITHOUT ANGINA PECTORIS: ICD-10-CM

## 2019-04-11 DIAGNOSIS — I10 ESSENTIAL HYPERTENSION: Primary | ICD-10-CM

## 2019-04-11 DIAGNOSIS — E78.2 MIXED HYPERLIPIDEMIA: ICD-10-CM

## 2019-04-11 PROCEDURE — 4040F PNEUMOC VAC/ADMIN/RCVD: CPT | Performed by: INTERNAL MEDICINE

## 2019-04-11 PROCEDURE — 1036F TOBACCO NON-USER: CPT | Performed by: INTERNAL MEDICINE

## 2019-04-11 PROCEDURE — 1123F ACP DISCUSS/DSCN MKR DOCD: CPT | Performed by: INTERNAL MEDICINE

## 2019-04-11 PROCEDURE — 99213 OFFICE O/P EST LOW 20 MIN: CPT | Performed by: INTERNAL MEDICINE

## 2019-04-11 PROCEDURE — G8427 DOCREV CUR MEDS BY ELIG CLIN: HCPCS | Performed by: INTERNAL MEDICINE

## 2019-04-11 PROCEDURE — G8420 CALC BMI NORM PARAMETERS: HCPCS | Performed by: INTERNAL MEDICINE

## 2019-04-11 PROCEDURE — G8598 ASA/ANTIPLAT THER USED: HCPCS | Performed by: INTERNAL MEDICINE

## 2019-04-11 PROCEDURE — 99211 OFF/OP EST MAY X REQ PHY/QHP: CPT | Performed by: INTERNAL MEDICINE

## 2019-04-11 PROCEDURE — 3017F COLORECTAL CA SCREEN DOC REV: CPT | Performed by: INTERNAL MEDICINE

## 2019-04-11 RX ORDER — FLUTICASONE PROPIONATE 50 MCG
1 SPRAY, SUSPENSION (ML) NASAL DAILY
Qty: 1 BOTTLE | Refills: 3 | Status: SHIPPED | OUTPATIENT
Start: 2019-04-11 | End: 2019-10-31 | Stop reason: SDUPTHER

## 2019-04-11 RX ORDER — CETIRIZINE HYDROCHLORIDE, PSEUDOEPHEDRINE HYDROCHLORIDE 5; 120 MG/1; MG/1
1 TABLET, FILM COATED, EXTENDED RELEASE ORAL 2 TIMES DAILY
Qty: 30 TABLET | Refills: 0 | Status: SHIPPED | OUTPATIENT
Start: 2019-04-11 | End: 2019-04-26

## 2019-04-11 NOTE — PROGRESS NOTES
Patient here for follow-up on hypertension and other problems including hyperlipidemia and coronary artery disease. Last time his blood pressure was high. He has a history artery disease with a stent in the past.  Stress test last year was normal.  His main concern today is allergic rhinitis. No sinus infection. No fever or chills. Plan  Continue same meds. Flonase ordered. Continue aspirin and statins beta blockers and ACE inhibitor's. Attending Physician Statement  I have discussed the care of Raymond Sanchez, including pertinent history and exam findings,  with the resident. I have reviewed the key elements of all parts of the encounter with the resident. I agree with the assessment, plan and orders as documented by the resident.   (GE Modifier)

## 2019-04-11 NOTE — PROGRESS NOTES
[facility]    Progress Note    Date of patient's visit: 4/11/2019  YOB: 1950  Patient's Name:  Rafael Betts    Patient Care Team:  Mattie Abdul MD as PCP - General (Internal Medicine)  Vasquez Manuel MD as PCP - [facility] Attributed Provider    REASON FOR VISIT: Routine outpatient follow     HISTORY OF PRESENT ILLNESS:    History was obtained from the patient. Rafael Betts is a 77 y.o. is here for a  Follow up for HTN and allergy. Pt BP is within normal range. He decreased alcohol consumption from daily to just occasionally. He does not smoke. He is requesting something to help with congestion and runny nose. Patient Active Problem List   Diagnosis    Coronary artery disease s/p stent in 2014 by Dr. Dl Valdez at [facility]    Mixed hyperlipidemia    Chest pain    Essential hypertension       ALLERGIES    No Known Allergies      MEDICATIONS:      Current Outpatient Medications on File Prior to Visit   Medication Sig Dispense Refill    lisinopril (PRINIVIL;ZESTRIL) 20 MG tablet take 1 tablet by mouth once daily 30 tablet 5    aspirin EC 81 MG EC tablet Take 1 tablet by mouth daily 30 tablet 5    rosuvastatin (CRESTOR) 20 MG tablet Take 1 tablet by mouth nightly 30 tablet 5    nitroGLYCERIN (NITROSTAT) 0.4 MG SL tablet Place 0.4 mg under the tongue every 5 minutes as needed for Chest pain Dissolve 1 tab under tongue at first sign of chest pain. May repeat every 5 minutes until relief is obtained. If pain persists after taking 3 tabs in a 15-minute period, or the pain is different than is typically experienced, call 9-1-1 immediately.       carvedilol (COREG) 6.25 MG tablet take 1 tablet by mouth twice a day with meals 60 tablet 5    ticagrelor (BRILINTA) 90 MG TABS tablet Take 90 mg by mouth 2 times daily      docusate sodium (COLACE) 100 MG capsule Take 1 capsule by mouth 2 times daily 30 capsule 0    carboxymethylcellulose (LUBRICANT EYE DROPS) 0.5 % SOLN ophthalmic solution Place 1 drop into both eyes 3 times daily 1 Bottle 0     No current facility-administered medications on file prior to visit. SOCIAL HISTORY    Reviewed and no change from previous record. Salvador Gimenez  reports that he quit smoking about 24 years ago. He has a 5.00 pack-year smoking history. He has never used smokeless tobacco.    FAMILY HISTORY:    Reviewed and No change from previous visit    REVIEW OF SYSTEMS:    ENT: negative  Respiratory: no cough, shortness of breath, or wheezing  Cardiovascular: no chest pain or dyspnea on exertion  Gastrointestinal: no abdominal pain, black or bloody stools  Neurological: no TIA or stroke symptoms  Endocrine: negative  Genito-Urinary: no dysuria, trouble voiding, or hematuria  Musculoskeletal: negative  Dermatological: negative    PHYSICAL EXAM:      Vitals:    04/11/19 0843   BP: 123/78   Pulse: 93   Resp: 20   Temp: 97.6 °F (36.4 °C)   SpO2: 96%     Physical Exam   Constitutional: He is oriented to person, place, and time. He appears well-nourished. HENT:   Head: Atraumatic. Eyes: EOM are normal.   Neck: Neck supple. Pulmonary/Chest: Effort normal.   Abdominal: Soft. Neurological: He is alert and oriented to person, place, and time. Skin: Skin is warm. Psychiatric: He has a normal mood and affect.           LABORATORY FINDINGS:    CBC:  Lab Results   Component Value Date    WBC 5.6 12/27/2018    HGB 13.0 12/27/2018     12/27/2018     10/18/2011     BMP:    Lab Results   Component Value Date     12/27/2018    K 4.0 12/27/2018     12/27/2018    CO2 22 12/27/2018    BUN 17 12/27/2018    CREATININE 0.82 12/27/2018    GLUCOSE 70 12/27/2018    GLUCOSE 96 10/18/2011     HEMOGLOBIN A1C: No results found for: LABA1C  MICROALBUMIN URINE: No results found for: MICROALBUR  FASTING LIPID PANEL:  Lab Results   Component Value Date    CHOL 129 11/20/2017    HDL 74 11/20/2017    TRIG 59 11/20/2017 LIVER PROFILE:  Lab Results   Component Value Date    ALT 19 11/30/2018    AST 27 11/30/2018    PROT 7.4 11/30/2018    BILITOT 0.66 11/30/2018    LABALBU 4.2 11/30/2018    LABALBU 4.6 10/18/2011      THYROID FUNCTION:   Lab Results   Component Value Date    TSH 1.16 09/30/2016      URINE ANALYSIS: No results found for: LABURIN  ASSESSMENT AND PLAN:    1. Allergic sinusitis    - cetirizine-psuedoephedrine (ZYRTEC-D) 5-120 MG per extended release tablet; Take 1 tablet by mouth 2 times daily for 15 days  Dispense: 30 tablet; Refill: 0  - fluticasone (FLONASE) 50 MCG/ACT nasal spray; 1 spray by Each Nare route daily  Dispense: 1 Bottle; Refill: 3    2. Essential hypertension.   - under control on current regimen.   - follow in 3 months     3. CAD with stent in 2014. Stable  - following with cardiology   - on BB/Aspirin/statin/lisinopril     4. Mixed Hyperlipidemia   - continue statin       FOLLOW UP:   3 months     1. Nate Boucher received counseling on the following healthy behaviors: nutrition, exercise, medication adherence and decrease in alcohol consumption  2. Patient given educational materials - see patient instructions  3. Discussed use, benefit, and side effects of prescribed medications. Barriers to medication compliance addressed. All patient questions answered. Pt voiced understanding. 4.  Reviewed prior labs and health maintenance  5. Continue current medications, diet and exercise.          Rayne Seip, MD,  Internal Medicine Resident  [facility]  4/11/2019  9:08 AM

## 2019-07-11 ENCOUNTER — HOSPITAL ENCOUNTER (OUTPATIENT)
Age: 69
Setting detail: SPECIMEN
Discharge: HOME | End: 2019-07-11
Payer: MEDICARE

## 2019-07-11 ENCOUNTER — OFFICE VISIT (OUTPATIENT)
Dept: INTERNAL MEDICINE | Age: 69
End: 2019-07-11
Payer: MEDICARE

## 2019-07-11 VITALS
DIASTOLIC BLOOD PRESSURE: 79 MMHG | HEIGHT: 74 IN | BODY MASS INDEX: 20.41 KG/M2 | WEIGHT: 159 LBS | HEART RATE: 88 BPM | SYSTOLIC BLOOD PRESSURE: 124 MMHG

## 2019-07-11 DIAGNOSIS — E78.2 MIXED HYPERLIPIDEMIA: ICD-10-CM

## 2019-07-11 DIAGNOSIS — Z12.11 COLON CANCER SCREENING: Primary | ICD-10-CM

## 2019-07-11 DIAGNOSIS — I10 ESSENTIAL HYPERTENSION: ICD-10-CM

## 2019-07-11 DIAGNOSIS — I25.10 CORONARY ARTERY DISEASE INVOLVING NATIVE CORONARY ARTERY OF NATIVE HEART WITHOUT ANGINA PECTORIS: ICD-10-CM

## 2019-07-11 LAB
CHOLESTEROL, FASTING: 181 MG/DL
CHOLESTEROL/HDL RATIO: 2.4
HDLC SERPL-MCNC: 77 MG/DL
LDL CHOLESTEROL: 84 MG/DL (ref 0–130)
TRIGLYCERIDE, FASTING: 99 MG/DL
VLDLC SERPL CALC-MCNC: NORMAL MG/DL (ref 1–30)

## 2019-07-11 PROCEDURE — 1036F TOBACCO NON-USER: CPT | Performed by: STUDENT IN AN ORGANIZED HEALTH CARE EDUCATION/TRAINING PROGRAM

## 2019-07-11 PROCEDURE — 1123F ACP DISCUSS/DSCN MKR DOCD: CPT | Performed by: STUDENT IN AN ORGANIZED HEALTH CARE EDUCATION/TRAINING PROGRAM

## 2019-07-11 PROCEDURE — 36415 COLL VENOUS BLD VENIPUNCTURE: CPT

## 2019-07-11 PROCEDURE — 99213 OFFICE O/P EST LOW 20 MIN: CPT | Performed by: STUDENT IN AN ORGANIZED HEALTH CARE EDUCATION/TRAINING PROGRAM

## 2019-07-11 PROCEDURE — 3017F COLORECTAL CA SCREEN DOC REV: CPT | Performed by: STUDENT IN AN ORGANIZED HEALTH CARE EDUCATION/TRAINING PROGRAM

## 2019-07-11 PROCEDURE — G8427 DOCREV CUR MEDS BY ELIG CLIN: HCPCS | Performed by: STUDENT IN AN ORGANIZED HEALTH CARE EDUCATION/TRAINING PROGRAM

## 2019-07-11 PROCEDURE — G8420 CALC BMI NORM PARAMETERS: HCPCS | Performed by: STUDENT IN AN ORGANIZED HEALTH CARE EDUCATION/TRAINING PROGRAM

## 2019-07-11 PROCEDURE — 99211 OFF/OP EST MAY X REQ PHY/QHP: CPT | Performed by: INTERNAL MEDICINE

## 2019-07-11 PROCEDURE — 4040F PNEUMOC VAC/ADMIN/RCVD: CPT | Performed by: STUDENT IN AN ORGANIZED HEALTH CARE EDUCATION/TRAINING PROGRAM

## 2019-07-11 PROCEDURE — G8598 ASA/ANTIPLAT THER USED: HCPCS | Performed by: STUDENT IN AN ORGANIZED HEALTH CARE EDUCATION/TRAINING PROGRAM

## 2019-07-11 PROCEDURE — 80061 LIPID PANEL: CPT

## 2019-07-11 RX ORDER — ROSUVASTATIN CALCIUM 20 MG/1
20 TABLET, COATED ORAL NIGHTLY
Qty: 30 TABLET | Refills: 5 | Status: SHIPPED | OUTPATIENT
Start: 2019-07-11 | End: 2019-10-31 | Stop reason: SDUPTHER

## 2019-07-11 RX ORDER — LISINOPRIL 20 MG/1
TABLET ORAL
Qty: 30 TABLET | Refills: 5 | Status: SHIPPED | OUTPATIENT
Start: 2019-07-11 | End: 2019-10-31 | Stop reason: SDUPTHER

## 2019-07-11 RX ORDER — CARVEDILOL 6.25 MG/1
TABLET ORAL
Qty: 60 TABLET | Refills: 5 | Status: SHIPPED | OUTPATIENT
Start: 2019-07-11 | End: 2019-10-31 | Stop reason: SDUPTHER

## 2019-07-11 RX ORDER — ASPIRIN 81 MG/1
81 TABLET ORAL DAILY
Qty: 30 TABLET | Refills: 5 | Status: SHIPPED | OUTPATIENT
Start: 2019-07-11 | End: 2019-10-31 | Stop reason: SDUPTHER

## 2019-09-30 ENCOUNTER — TELEPHONE (OUTPATIENT)
Dept: INTERNAL MEDICINE | Age: 69
End: 2019-09-30

## 2019-09-30 NOTE — LETTER
[address]  [address] [address]  Phone: [phone]  Fax: [phone]    Deborah Lindo MD        September 30, 2019    [address] [place] [address]      Dear Sravanthi Faria: We were unable to reach you by phone. It is important that you contact us by calling [phone], at your earliest convenience. This message is regarding your Cologuard test.     If you have any questions or concerns, please don't hesitate to call.     Sincerely,        Deborah Lindo MD

## 2019-10-17 ENCOUNTER — OFFICE VISIT (OUTPATIENT)
Dept: INTERNAL MEDICINE | Age: 69
End: 2019-10-17
Payer: MEDICARE

## 2019-10-17 VITALS
HEART RATE: 69 BPM | DIASTOLIC BLOOD PRESSURE: 82 MMHG | SYSTOLIC BLOOD PRESSURE: 148 MMHG | BODY MASS INDEX: 21.05 KG/M2 | WEIGHT: 164 LBS | HEIGHT: 74 IN

## 2019-10-17 DIAGNOSIS — Z00.00 HEALTHCARE MAINTENANCE: Primary | ICD-10-CM

## 2019-10-17 DIAGNOSIS — Z12.11 COLON CANCER SCREENING: ICD-10-CM

## 2019-10-17 DIAGNOSIS — Z23 FLU VACCINE NEED: ICD-10-CM

## 2019-10-17 DIAGNOSIS — Z23 NEEDS FLU SHOT: ICD-10-CM

## 2019-10-17 PROCEDURE — 99213 OFFICE O/P EST LOW 20 MIN: CPT | Performed by: STUDENT IN AN ORGANIZED HEALTH CARE EDUCATION/TRAINING PROGRAM

## 2019-10-17 PROCEDURE — G8427 DOCREV CUR MEDS BY ELIG CLIN: HCPCS | Performed by: STUDENT IN AN ORGANIZED HEALTH CARE EDUCATION/TRAINING PROGRAM

## 2019-10-17 PROCEDURE — 4040F PNEUMOC VAC/ADMIN/RCVD: CPT | Performed by: STUDENT IN AN ORGANIZED HEALTH CARE EDUCATION/TRAINING PROGRAM

## 2019-10-17 PROCEDURE — 1123F ACP DISCUSS/DSCN MKR DOCD: CPT | Performed by: STUDENT IN AN ORGANIZED HEALTH CARE EDUCATION/TRAINING PROGRAM

## 2019-10-17 PROCEDURE — 90688 IIV4 VACCINE SPLT 0.5 ML IM: CPT | Performed by: STUDENT IN AN ORGANIZED HEALTH CARE EDUCATION/TRAINING PROGRAM

## 2019-10-17 PROCEDURE — 3017F COLORECTAL CA SCREEN DOC REV: CPT | Performed by: STUDENT IN AN ORGANIZED HEALTH CARE EDUCATION/TRAINING PROGRAM

## 2019-10-17 PROCEDURE — G8420 CALC BMI NORM PARAMETERS: HCPCS | Performed by: STUDENT IN AN ORGANIZED HEALTH CARE EDUCATION/TRAINING PROGRAM

## 2019-10-17 PROCEDURE — 99211 OFF/OP EST MAY X REQ PHY/QHP: CPT | Performed by: INTERNAL MEDICINE

## 2019-10-17 PROCEDURE — G8598 ASA/ANTIPLAT THER USED: HCPCS | Performed by: STUDENT IN AN ORGANIZED HEALTH CARE EDUCATION/TRAINING PROGRAM

## 2019-10-17 PROCEDURE — 1036F TOBACCO NON-USER: CPT | Performed by: STUDENT IN AN ORGANIZED HEALTH CARE EDUCATION/TRAINING PROGRAM

## 2019-10-17 PROCEDURE — G8482 FLU IMMUNIZE ORDER/ADMIN: HCPCS | Performed by: STUDENT IN AN ORGANIZED HEALTH CARE EDUCATION/TRAINING PROGRAM

## 2019-10-31 ENCOUNTER — OFFICE VISIT (OUTPATIENT)
Dept: INTERNAL MEDICINE | Age: 69
End: 2019-10-31
Payer: MEDICARE

## 2019-10-31 VITALS
WEIGHT: 164.2 LBS | HEIGHT: 74 IN | HEART RATE: 108 BPM | BODY MASS INDEX: 21.07 KG/M2 | SYSTOLIC BLOOD PRESSURE: 94 MMHG | DIASTOLIC BLOOD PRESSURE: 71 MMHG

## 2019-10-31 DIAGNOSIS — I25.10 CORONARY ARTERY DISEASE INVOLVING NATIVE CORONARY ARTERY OF NATIVE HEART WITHOUT ANGINA PECTORIS: ICD-10-CM

## 2019-10-31 DIAGNOSIS — I10 ESSENTIAL HYPERTENSION: ICD-10-CM

## 2019-10-31 DIAGNOSIS — S92.302A CLOSED NONDISPLACED FRACTURE OF METATARSAL BONE OF LEFT FOOT, UNSPECIFIED METATARSAL, INITIAL ENCOUNTER: ICD-10-CM

## 2019-10-31 DIAGNOSIS — E78.2 MIXED HYPERLIPIDEMIA: ICD-10-CM

## 2019-10-31 DIAGNOSIS — Z12.11 COLON CANCER SCREENING: ICD-10-CM

## 2019-10-31 DIAGNOSIS — J30.9 ALLERGIC RHINITIS, UNSPECIFIED SEASONALITY, UNSPECIFIED TRIGGER: ICD-10-CM

## 2019-10-31 DIAGNOSIS — R55 SYNCOPE, UNSPECIFIED SYNCOPE TYPE: Primary | ICD-10-CM

## 2019-10-31 PROCEDURE — G8598 ASA/ANTIPLAT THER USED: HCPCS | Performed by: STUDENT IN AN ORGANIZED HEALTH CARE EDUCATION/TRAINING PROGRAM

## 2019-10-31 PROCEDURE — 99213 OFFICE O/P EST LOW 20 MIN: CPT | Performed by: STUDENT IN AN ORGANIZED HEALTH CARE EDUCATION/TRAINING PROGRAM

## 2019-10-31 PROCEDURE — G8427 DOCREV CUR MEDS BY ELIG CLIN: HCPCS | Performed by: STUDENT IN AN ORGANIZED HEALTH CARE EDUCATION/TRAINING PROGRAM

## 2019-10-31 PROCEDURE — G8420 CALC BMI NORM PARAMETERS: HCPCS | Performed by: STUDENT IN AN ORGANIZED HEALTH CARE EDUCATION/TRAINING PROGRAM

## 2019-10-31 PROCEDURE — 1123F ACP DISCUSS/DSCN MKR DOCD: CPT | Performed by: STUDENT IN AN ORGANIZED HEALTH CARE EDUCATION/TRAINING PROGRAM

## 2019-10-31 PROCEDURE — 1036F TOBACCO NON-USER: CPT | Performed by: STUDENT IN AN ORGANIZED HEALTH CARE EDUCATION/TRAINING PROGRAM

## 2019-10-31 PROCEDURE — 4040F PNEUMOC VAC/ADMIN/RCVD: CPT | Performed by: STUDENT IN AN ORGANIZED HEALTH CARE EDUCATION/TRAINING PROGRAM

## 2019-10-31 PROCEDURE — 3017F COLORECTAL CA SCREEN DOC REV: CPT | Performed by: STUDENT IN AN ORGANIZED HEALTH CARE EDUCATION/TRAINING PROGRAM

## 2019-10-31 PROCEDURE — G8482 FLU IMMUNIZE ORDER/ADMIN: HCPCS | Performed by: STUDENT IN AN ORGANIZED HEALTH CARE EDUCATION/TRAINING PROGRAM

## 2019-10-31 PROCEDURE — 99211 OFF/OP EST MAY X REQ PHY/QHP: CPT | Performed by: INTERNAL MEDICINE

## 2019-10-31 RX ORDER — BLOOD PRESSURE TEST KIT
1 KIT MISCELLANEOUS ONCE
Qty: 1 KIT | Refills: 0 | Status: SHIPPED | OUTPATIENT
Start: 2019-10-31 | End: 2019-10-31

## 2019-10-31 RX ORDER — CARVEDILOL 6.25 MG/1
TABLET ORAL
Qty: 60 TABLET | Refills: 5 | Status: SHIPPED | OUTPATIENT
Start: 2019-10-31 | End: 2019-12-19 | Stop reason: SDUPTHER

## 2019-10-31 RX ORDER — ROSUVASTATIN CALCIUM 20 MG/1
20 TABLET, COATED ORAL NIGHTLY
Qty: 30 TABLET | Refills: 5 | Status: SHIPPED | OUTPATIENT
Start: 2019-10-31 | End: 2019-12-19 | Stop reason: SDUPTHER

## 2019-10-31 RX ORDER — NITROGLYCERIN 0.4 MG/1
TABLET SUBLINGUAL
Qty: 25 TABLET | Refills: 0 | Status: SHIPPED | OUTPATIENT
Start: 2019-10-31 | End: 2019-12-19 | Stop reason: SDUPTHER

## 2019-10-31 RX ORDER — ASPIRIN 81 MG/1
81 TABLET ORAL DAILY
Qty: 30 TABLET | Refills: 5 | Status: SHIPPED | OUTPATIENT
Start: 2019-10-31 | End: 2019-12-19 | Stop reason: SDUPTHER

## 2019-10-31 RX ORDER — ACETAMINOPHEN 500 MG
500 TABLET ORAL 4 TIMES DAILY PRN
Qty: 30 TABLET | Refills: 1 | Status: SHIPPED | OUTPATIENT
Start: 2019-10-31 | End: 2020-06-18

## 2019-10-31 RX ORDER — LISINOPRIL 10 MG/1
TABLET ORAL
Qty: 30 TABLET | Refills: 2 | Status: SHIPPED | OUTPATIENT
Start: 2019-10-31 | End: 2019-12-19 | Stop reason: SDUPTHER

## 2019-10-31 RX ORDER — FLUTICASONE PROPIONATE 50 MCG
1 SPRAY, SUSPENSION (ML) NASAL DAILY
Qty: 1 BOTTLE | Refills: 3 | Status: SHIPPED | OUTPATIENT
Start: 2019-10-31 | End: 2019-12-19 | Stop reason: SDUPTHER

## 2019-11-01 DIAGNOSIS — T14.90XA INJURY: Primary | ICD-10-CM

## 2019-12-19 ENCOUNTER — OFFICE VISIT (OUTPATIENT)
Dept: INTERNAL MEDICINE | Age: 69
End: 2019-12-19
Payer: MEDICARE

## 2019-12-19 VITALS
SYSTOLIC BLOOD PRESSURE: 113 MMHG | HEIGHT: 74 IN | HEART RATE: 79 BPM | DIASTOLIC BLOOD PRESSURE: 74 MMHG | WEIGHT: 166 LBS | BODY MASS INDEX: 21.3 KG/M2

## 2019-12-19 DIAGNOSIS — I25.10 CORONARY ARTERY DISEASE INVOLVING NATIVE CORONARY ARTERY OF NATIVE HEART WITHOUT ANGINA PECTORIS: ICD-10-CM

## 2019-12-19 DIAGNOSIS — H10.023 PINK EYE DISEASE OF BOTH EYES: Primary | ICD-10-CM

## 2019-12-19 DIAGNOSIS — E78.2 MIXED HYPERLIPIDEMIA: ICD-10-CM

## 2019-12-19 DIAGNOSIS — J30.9 ALLERGIC RHINITIS, UNSPECIFIED SEASONALITY, UNSPECIFIED TRIGGER: ICD-10-CM

## 2019-12-19 DIAGNOSIS — Z12.11 COLON CANCER SCREENING: ICD-10-CM

## 2019-12-19 DIAGNOSIS — I10 ESSENTIAL HYPERTENSION: ICD-10-CM

## 2019-12-19 PROCEDURE — G8420 CALC BMI NORM PARAMETERS: HCPCS | Performed by: STUDENT IN AN ORGANIZED HEALTH CARE EDUCATION/TRAINING PROGRAM

## 2019-12-19 PROCEDURE — 3017F COLORECTAL CA SCREEN DOC REV: CPT | Performed by: STUDENT IN AN ORGANIZED HEALTH CARE EDUCATION/TRAINING PROGRAM

## 2019-12-19 PROCEDURE — G8598 ASA/ANTIPLAT THER USED: HCPCS | Performed by: STUDENT IN AN ORGANIZED HEALTH CARE EDUCATION/TRAINING PROGRAM

## 2019-12-19 PROCEDURE — 1036F TOBACCO NON-USER: CPT | Performed by: STUDENT IN AN ORGANIZED HEALTH CARE EDUCATION/TRAINING PROGRAM

## 2019-12-19 PROCEDURE — G8427 DOCREV CUR MEDS BY ELIG CLIN: HCPCS | Performed by: STUDENT IN AN ORGANIZED HEALTH CARE EDUCATION/TRAINING PROGRAM

## 2019-12-19 PROCEDURE — 4040F PNEUMOC VAC/ADMIN/RCVD: CPT | Performed by: STUDENT IN AN ORGANIZED HEALTH CARE EDUCATION/TRAINING PROGRAM

## 2019-12-19 PROCEDURE — 99211 OFF/OP EST MAY X REQ PHY/QHP: CPT | Performed by: INTERNAL MEDICINE

## 2019-12-19 PROCEDURE — 1123F ACP DISCUSS/DSCN MKR DOCD: CPT | Performed by: STUDENT IN AN ORGANIZED HEALTH CARE EDUCATION/TRAINING PROGRAM

## 2019-12-19 PROCEDURE — 99213 OFFICE O/P EST LOW 20 MIN: CPT | Performed by: STUDENT IN AN ORGANIZED HEALTH CARE EDUCATION/TRAINING PROGRAM

## 2019-12-19 PROCEDURE — G8482 FLU IMMUNIZE ORDER/ADMIN: HCPCS | Performed by: STUDENT IN AN ORGANIZED HEALTH CARE EDUCATION/TRAINING PROGRAM

## 2019-12-19 RX ORDER — NITROGLYCERIN 0.4 MG/1
TABLET SUBLINGUAL
Qty: 25 TABLET | Refills: 0 | Status: SHIPPED | OUTPATIENT
Start: 2019-12-19 | End: 2022-08-23 | Stop reason: SDUPTHER

## 2019-12-19 RX ORDER — CARVEDILOL 6.25 MG/1
TABLET ORAL
Qty: 60 TABLET | Refills: 5 | Status: SHIPPED | OUTPATIENT
Start: 2019-12-19 | End: 2020-06-18 | Stop reason: SDUPTHER

## 2019-12-19 RX ORDER — KETOTIFEN FUMARATE 0.35 MG/ML
1 SOLUTION/ DROPS OPHTHALMIC 2 TIMES DAILY
Qty: 1 ML | Refills: 0 | Status: SHIPPED | OUTPATIENT
Start: 2019-12-19 | End: 2019-12-29

## 2019-12-19 RX ORDER — ROSUVASTATIN CALCIUM 20 MG/1
20 TABLET, COATED ORAL NIGHTLY
Qty: 30 TABLET | Refills: 5 | Status: SHIPPED | OUTPATIENT
Start: 2019-12-19 | End: 2020-06-18 | Stop reason: SDUPTHER

## 2019-12-19 RX ORDER — FLUTICASONE PROPIONATE 50 MCG
1 SPRAY, SUSPENSION (ML) NASAL DAILY
Qty: 1 BOTTLE | Refills: 3 | Status: SHIPPED | OUTPATIENT
Start: 2019-12-19 | End: 2020-06-18 | Stop reason: ALTCHOICE

## 2019-12-19 RX ORDER — ASPIRIN 81 MG/1
81 TABLET ORAL DAILY
Qty: 30 TABLET | Refills: 5 | Status: SHIPPED | OUTPATIENT
Start: 2019-12-19 | End: 2020-06-18 | Stop reason: SDUPTHER

## 2019-12-19 RX ORDER — TETRAHYDROZOLINE HCL 0.05 %
1 DROPS OPHTHALMIC (EYE) 3 TIMES DAILY
Qty: 1 BOTTLE | Refills: 0 | Status: SHIPPED | OUTPATIENT
Start: 2019-12-19 | End: 2020-06-18 | Stop reason: ALTCHOICE

## 2019-12-19 RX ORDER — LISINOPRIL 10 MG/1
TABLET ORAL
Qty: 30 TABLET | Refills: 2 | Status: SHIPPED | OUTPATIENT
Start: 2019-12-19 | End: 2020-02-07 | Stop reason: SDUPTHER

## 2019-12-19 RX ORDER — ETHYL ALCOHOL 700 MG/ML
1 GEL TOPICAL NIGHTLY
Qty: 1 TUBE | Refills: 0 | Status: SHIPPED | OUTPATIENT
Start: 2019-12-19 | End: 2020-06-18

## 2020-01-30 RX ORDER — LISINOPRIL 20 MG/1
TABLET ORAL
Qty: 30 TABLET | Refills: 5 | OUTPATIENT
Start: 2020-01-30

## 2020-02-03 NOTE — TELEPHONE ENCOUNTER
E-scribing request for lisinopril . Pt has future appt.        Health Maintenance   Topic Date Due    Colon cancer screen colonoscopy  04/07/2019    Annual Wellness Visit (AWV)  06/19/2019    Potassium monitoring  12/27/2019    Creatinine monitoring  12/27/2019    Pneumococcal 65+ years Vaccine (1 of 1 - PPSV23) 04/10/2020 (Originally 6/27/2013)    DTaP/Tdap/Td vaccine (1 - Tdap) 04/11/2020 (Originally 6/27/1959)    Shingles Vaccine (1 of 2) 04/11/2020 (Originally 6/27/1998)    Lipid screen  07/11/2020    Flu vaccine  Completed    AAA screen  Completed    Hepatitis C screen  Completed             (applicable per patient's age: Cancer Screenings, Depression Screening, Fall Risk Screening, Immunizations)    LDL Cholesterol (mg/dL)   Date Value   07/11/2019 84     AST (U/L)   Date Value   11/30/2018 27     ALT (U/L)   Date Value   11/30/2018 19     BUN (mg/dL)   Date Value   12/27/2018 17      (goal A1C is < 7)   (goal LDL is <100) need 30-50% reduction from baseline     BP Readings from Last 3 Encounters:   12/19/19 113/74   10/31/19 94/71   10/17/19 (!) 148/82    (goal /80)      All Future Testing planned in [facility]:  Lab Frequency Next Occurrence   Cologuard (For External Results Only) Once 06/25/2020   CBC With Auto Differential Once 06/25/2020   Comprehensive Metabolic Panel Once 34/33/8300   Hemoglobin A1C Once 06/25/2020   Cologuard Once 12/19/2020       Next Visit Date:  Future Appointments   Date Time Provider Hieu Mock   6/25/2020 10:50 AM Meredith Krueger MD [facility] IM [address]            Patient Active Problem List:     Coronary artery disease s/p stent in 2014 by Dr. Vasile Izaguirre at [facility]     Mixed hyperlipidemia     Chest pain     Essential hypertension     Episode of syncope

## 2020-02-05 RX ORDER — LISINOPRIL 20 MG/1
TABLET ORAL
Qty: 30 TABLET | Refills: 5 | OUTPATIENT
Start: 2020-02-05

## 2020-02-07 RX ORDER — LISINOPRIL 20 MG/1
TABLET ORAL
Qty: 30 TABLET | Refills: 1 | Status: SHIPPED | OUTPATIENT
Start: 2020-02-07 | End: 2020-03-10

## 2020-03-10 RX ORDER — LISINOPRIL 20 MG/1
TABLET ORAL
Qty: 30 TABLET | Refills: 1 | Status: SHIPPED | OUTPATIENT
Start: 2020-03-10 | End: 2020-05-16

## 2020-03-31 RX ORDER — LISINOPRIL 20 MG/1
TABLET ORAL
Qty: 30 TABLET | Refills: 1 | OUTPATIENT
Start: 2020-03-31

## 2020-05-16 RX ORDER — LISINOPRIL 20 MG/1
TABLET ORAL
Qty: 30 TABLET | Refills: 1 | Status: SHIPPED | OUTPATIENT
Start: 2020-05-16 | End: 2020-06-18 | Stop reason: SDUPTHER

## 2020-06-16 ENCOUNTER — TELEPHONE (OUTPATIENT)
Dept: INTERNAL MEDICINE | Age: 70
End: 2020-06-16

## 2020-06-16 NOTE — LETTER
[address]  [address] [facility] [address]  Phone: [phone]  Fax: [phone]    Adelfo Ware MD        June 16, 2020    [address] [place] [address]      Dear [place] Current: We are sorry to inform you but we had to cancel your appointment on 6/25/2020   with Belem Conner due to him graduating. We have tried to contact you on numerous occasions in regards to your appointment and have been unable to reach you. Please call the office at [phone] to reschedule your appointment or if you have any questions. We are sorry for any inconvenience. If you have any questions or concerns, please don't hesitate to call.     Sincerely,        Adelfo Ware MD

## 2020-06-18 ENCOUNTER — VIRTUAL VISIT (OUTPATIENT)
Dept: INTERNAL MEDICINE | Age: 70
End: 2020-06-18
Payer: MEDICARE

## 2020-06-18 PROCEDURE — 1036F TOBACCO NON-USER: CPT | Performed by: INTERNAL MEDICINE

## 2020-06-18 PROCEDURE — 3017F COLORECTAL CA SCREEN DOC REV: CPT | Performed by: INTERNAL MEDICINE

## 2020-06-18 PROCEDURE — 99213 OFFICE O/P EST LOW 20 MIN: CPT | Performed by: INTERNAL MEDICINE

## 2020-06-18 PROCEDURE — 1123F ACP DISCUSS/DSCN MKR DOCD: CPT | Performed by: INTERNAL MEDICINE

## 2020-06-18 PROCEDURE — G8420 CALC BMI NORM PARAMETERS: HCPCS | Performed by: INTERNAL MEDICINE

## 2020-06-18 PROCEDURE — G8427 DOCREV CUR MEDS BY ELIG CLIN: HCPCS | Performed by: INTERNAL MEDICINE

## 2020-06-18 PROCEDURE — 4040F PNEUMOC VAC/ADMIN/RCVD: CPT | Performed by: INTERNAL MEDICINE

## 2020-06-18 RX ORDER — CARVEDILOL 6.25 MG/1
TABLET ORAL
Qty: 60 TABLET | Refills: 5 | Status: SHIPPED | OUTPATIENT
Start: 2020-06-18 | End: 2021-01-06

## 2020-06-18 RX ORDER — ROSUVASTATIN CALCIUM 20 MG/1
20 TABLET, COATED ORAL NIGHTLY
Qty: 30 TABLET | Refills: 5 | Status: SHIPPED | OUTPATIENT
Start: 2020-06-18 | End: 2021-01-06

## 2020-06-18 RX ORDER — ASPIRIN 81 MG/1
81 TABLET ORAL DAILY
Qty: 30 TABLET | Refills: 5 | Status: ON HOLD | OUTPATIENT
Start: 2020-06-18 | End: 2020-08-04 | Stop reason: SDUPTHER

## 2020-06-18 RX ORDER — LISINOPRIL 20 MG/1
20 TABLET ORAL DAILY
Qty: 30 TABLET | Refills: 5 | Status: SHIPPED | OUTPATIENT
Start: 2020-06-18 | End: 2021-01-06

## 2020-07-08 ENCOUNTER — HOSPITAL ENCOUNTER (OUTPATIENT)
Age: 70
Discharge: HOME | End: 2020-07-08
Payer: MEDICARE

## 2020-07-08 ENCOUNTER — OFFICE VISIT (OUTPATIENT)
Dept: PRIMARY CARE CLINIC | Age: 70
End: 2020-07-08
Payer: MEDICARE

## 2020-07-08 VITALS
SYSTOLIC BLOOD PRESSURE: 113 MMHG | DIASTOLIC BLOOD PRESSURE: 68 MMHG | HEART RATE: 73 BPM | WEIGHT: 158 LBS | BODY MASS INDEX: 20.29 KG/M2 | TEMPERATURE: 97.7 F | OXYGEN SATURATION: 99 %

## 2020-07-08 LAB
ABSOLUTE EOS #: 0.25 K/UL (ref 0–0.44)
ABSOLUTE IMMATURE GRANULOCYTE: <0.03 K/UL (ref 0–0.3)
ABSOLUTE LYMPH #: 1.74 K/UL (ref 1.1–3.7)
ABSOLUTE MONO #: 0.73 K/UL (ref 0.1–1.2)
ALBUMIN SERPL-MCNC: 4.1 G/DL (ref 3.5–5.2)
ALBUMIN/GLOBULIN RATIO: 1.4 (ref 1–2.5)
ALP BLD-CCNC: 71 U/L (ref 40–129)
ALT SERPL-CCNC: 15 U/L (ref 5–41)
ANION GAP SERPL CALCULATED.3IONS-SCNC: 15 MMOL/L (ref 9–17)
AST SERPL-CCNC: 21 U/L
BASOPHILS # BLD: 1 % (ref 0–2)
BASOPHILS ABSOLUTE: 0.04 K/UL (ref 0–0.2)
BILIRUB SERPL-MCNC: 0.95 MG/DL (ref 0.3–1.2)
BUN BLDV-MCNC: 14 MG/DL (ref 8–23)
BUN/CREAT BLD: ABNORMAL (ref 9–20)
CALCIUM SERPL-MCNC: 9.3 MG/DL (ref 8.6–10.4)
CHLORIDE BLD-SCNC: 100 MMOL/L (ref 98–107)
CO2: 26 MMOL/L (ref 20–31)
CREAT SERPL-MCNC: 1.11 MG/DL (ref 0.7–1.2)
DIFFERENTIAL TYPE: ABNORMAL
EOSINOPHILS RELATIVE PERCENT: 4 % (ref 1–4)
GFR AFRICAN AMERICAN: >60 ML/MIN
GFR NON-AFRICAN AMERICAN: >60 ML/MIN
GFR SERPL CREATININE-BSD FRML MDRD: ABNORMAL ML/MIN/{1.73_M2}
GFR SERPL CREATININE-BSD FRML MDRD: ABNORMAL ML/MIN/{1.73_M2}
GLUCOSE BLD-MCNC: 89 MG/DL (ref 70–99)
HCT VFR BLD CALC: 33.9 % (ref 40.7–50.3)
HEMOGLOBIN: 11.3 G/DL (ref 13–17)
IMMATURE GRANULOCYTES: 0 %
LYMPHOCYTES # BLD: 27 % (ref 24–43)
MCH RBC QN AUTO: 32.8 PG (ref 25.2–33.5)
MCHC RBC AUTO-ENTMCNC: 33.3 G/DL (ref 28.4–34.8)
MCV RBC AUTO: 98.5 FL (ref 82.6–102.9)
MONOCYTES # BLD: 11 % (ref 3–12)
NRBC AUTOMATED: 0 PER 100 WBC
PDW BLD-RTO: 13.2 % (ref 11.8–14.4)
PLATELET # BLD: 284 K/UL (ref 138–453)
PLATELET ESTIMATE: ABNORMAL
PMV BLD AUTO: 10.5 FL (ref 8.1–13.5)
POTASSIUM SERPL-SCNC: 3.6 MMOL/L (ref 3.7–5.3)
RBC # BLD: 3.44 M/UL (ref 4.21–5.77)
RBC # BLD: ABNORMAL 10*6/UL
SEG NEUTROPHILS: 57 % (ref 36–65)
SEGMENTED NEUTROPHILS ABSOLUTE COUNT: 3.75 K/UL (ref 1.5–8.1)
SODIUM BLD-SCNC: 141 MMOL/L (ref 135–144)
TOTAL PROTEIN: 7.1 G/DL (ref 6.4–8.3)
WBC # BLD: 6.5 K/UL (ref 3.5–11.3)
WBC # BLD: ABNORMAL 10*3/UL

## 2020-07-08 PROCEDURE — 4040F PNEUMOC VAC/ADMIN/RCVD: CPT | Performed by: INTERNAL MEDICINE

## 2020-07-08 PROCEDURE — 3017F COLORECTAL CA SCREEN DOC REV: CPT | Performed by: INTERNAL MEDICINE

## 2020-07-08 PROCEDURE — 80053 COMPREHEN METABOLIC PANEL: CPT

## 2020-07-08 PROCEDURE — 99214 OFFICE O/P EST MOD 30 MIN: CPT | Performed by: INTERNAL MEDICINE

## 2020-07-08 PROCEDURE — 85025 COMPLETE CBC W/AUTO DIFF WBC: CPT

## 2020-07-08 PROCEDURE — G8427 DOCREV CUR MEDS BY ELIG CLIN: HCPCS | Performed by: INTERNAL MEDICINE

## 2020-07-08 PROCEDURE — 1036F TOBACCO NON-USER: CPT | Performed by: INTERNAL MEDICINE

## 2020-07-08 PROCEDURE — G8420 CALC BMI NORM PARAMETERS: HCPCS | Performed by: INTERNAL MEDICINE

## 2020-07-08 PROCEDURE — 36415 COLL VENOUS BLD VENIPUNCTURE: CPT

## 2020-07-08 PROCEDURE — 1123F ACP DISCUSS/DSCN MKR DOCD: CPT | Performed by: INTERNAL MEDICINE

## 2020-07-08 RX ORDER — LISINOPRIL 10 MG/1
10 TABLET ORAL DAILY
Qty: 30 TABLET | Refills: 5 | Status: SHIPPED | OUTPATIENT
Start: 2020-07-08 | End: 2021-09-22 | Stop reason: SDUPTHER

## 2020-07-08 NOTE — PROGRESS NOTES
Visit Information    Have you changed or started any medications since your last visit including any over-the-counter medicines, vitamins, or herbal medicines? no   Are you having any side effects from any of your medications? -  no  Have you stopped taking any of your medications? Is so, why? -  no    Have you seen any other physician or provider since your last visit? No  Have you had any other diagnostic tests since your last visit? No  Have you been seen in the emergency room and/or had an admission to a hospital since we last saw you? No  Have you had your routine dental cleaning in the past 6 months? no    Have you activated your [employer] account? If not, what are your barriers?  Yes     Patient Care Team:  Sanchez Lomas MD as PCP - General (Internal Medicine)  Roberta Wilkins MD as PCP - [facility]    Medical History Review  Past Medical, Family, and Social History reviewed and does not contribute to the patient presenting condition    Health Maintenance   Topic Date Due    DTaP/Tdap/Td vaccine (1 - Tdap) 06/27/1967    Shingles Vaccine (1 of 2) 06/27/1998    Pneumococcal 65+ years Vaccine (1 of 1 - PPSV23) 06/27/2013    Colon cancer screen colonoscopy  04/07/2019    Annual Wellness Visit (AWV)  06/19/2019    Potassium monitoring  12/27/2019    Creatinine monitoring  12/27/2019    Lipid screen  07/11/2020    Flu vaccine (1) 09/01/2020    AAA screen  Completed    Hepatitis C screen  Completed    Hepatitis A vaccine  Aged Out    Hepatitis B vaccine  Aged Out    Hib vaccine  Aged Out    Meningococcal (ACWY) vaccine  Aged Out

## 2020-07-13 ENCOUNTER — VIRTUAL VISIT (OUTPATIENT)
Dept: INTERNAL MEDICINE | Age: 70
End: 2020-07-13
Payer: MEDICARE

## 2020-07-13 PROCEDURE — 99442 PR PHYS/QHP TELEPHONE EVALUATION 11-20 MIN: CPT | Performed by: INTERNAL MEDICINE

## 2020-07-13 ASSESSMENT — PATIENT HEALTH QUESTIONNAIRE - PHQ9
1. LITTLE INTEREST OR PLEASURE IN DOING THINGS: 0
SUM OF ALL RESPONSES TO PHQ9 QUESTIONS 1 & 2: 0
SUM OF ALL RESPONSES TO PHQ QUESTIONS 1-9: 0
SUM OF ALL RESPONSES TO PHQ QUESTIONS 1-9: 0
2. FEELING DOWN, DEPRESSED OR HOPELESS: 0

## 2020-07-13 ASSESSMENT — ENCOUNTER SYMPTOMS
RESPIRATORY NEGATIVE: 1
GASTROINTESTINAL NEGATIVE: 1
EYES NEGATIVE: 1

## 2020-07-13 NOTE — PROGRESS NOTES
mouth daily  Patient not taking: Reported on 2020  Flor Zamora MD       Social History     Tobacco Use    Smoking status: Former Smoker     Packs/day: 0.25     Years: 20.00     Pack years: 5.00     Last attempt to quit: 1995     Years since quittin.5    Smokeless tobacco: Never Used   Substance Use Topics    Alcohol use: Yes     Comment: 2x a week     Drug use: No            RECORD REVIEW: Previous medical records were reviewed at today's visit. PHYSICAL EXAMINATION:    Vital Signs: (As obtained by patient/caregiver at home)      Constitutional: [] Appears well-developed and well-nourished [x] No apparent distress      [] Abnormal   Mental status  [] Alert and awake  [x] Oriented to person/place/time [x]Able to follow commands        Pulmonary/Chest: [x] Respiratory effort normal.  [] No visualized signs of difficulty breathing or respiratory distress        [] Abnormal              Psychiatric:       [x] Normal [] Abnormal        [] No Hallucinations    Other pertinent observable physical exam findings:-            RECORD REVIEW: Previous medical records were reviewed at today's visit. The past family, medical and social histories were reviewed and unchanged with the exceptions of what is mentioned in this note. Due to this being a TeleHealth encounter, evaluation of the following organ systems is limited: Vitals/Constitutional/EENT/Resp/CV/GI//MS/Neuro/Skin/Heme-Lymph-Imm. ASSESSMENT/PLAN:  1. Essential hypertension      2. Iron deficiency anemia due to chronic blood loss      3. Heat syncope, initial encounter    Plan :     Continue reduced dose of lisinopril 10 mg in addition to coreg for hypertension,   Advised abstinence from alcohol   Labs show mild anemia , he already has a referral for GI         Total Time spent 15 min     No follow-ups on file. An  electronic signature was used to authenticate this note.     --Dania Bonilla MD on 2020 at 11:29 AM    9}    Pursuant to the emergency declaration under the [facility], [facility] waiver authority and the [employer] and [employer] Act, this Virtual  Visit was conducted, with patient's consent, to reduce the patient's risk of exposure to COVID-19 and provide continuity of care for an established patient. Services were provided through a telephone discussion virtually to substitute for in-person clinic visit.

## 2020-08-03 ENCOUNTER — HOSPITAL ENCOUNTER (OUTPATIENT)
Age: 70
Setting detail: OBSERVATION
Discharge: HOME | End: 2020-08-04
Attending: STUDENT IN AN ORGANIZED HEALTH CARE EDUCATION/TRAINING PROGRAM | Admitting: UROLOGY
Payer: MEDICARE

## 2020-08-03 ENCOUNTER — ANESTHESIA (OUTPATIENT)
Dept: OPERATING ROOM | Age: 70
End: 2020-08-03
Payer: MEDICARE

## 2020-08-03 ENCOUNTER — ANESTHESIA EVENT (OUTPATIENT)
Dept: OPERATING ROOM | Age: 70
End: 2020-08-03
Payer: MEDICARE

## 2020-08-03 VITALS — TEMPERATURE: 94.6 F | DIASTOLIC BLOOD PRESSURE: 56 MMHG | SYSTOLIC BLOOD PRESSURE: 74 MMHG | OXYGEN SATURATION: 100 %

## 2020-08-03 PROBLEM — N48.30 PRIAPISM: Status: ACTIVE | Noted: 2020-08-03

## 2020-08-03 LAB
ABSOLUTE EOS #: 0.07 K/UL (ref 0–0.44)
ABSOLUTE IMMATURE GRANULOCYTE: <0.03 K/UL (ref 0–0.3)
ABSOLUTE LYMPH #: 1.62 K/UL (ref 1.1–3.7)
ABSOLUTE MONO #: 0.72 K/UL (ref 0.1–1.2)
ANION GAP SERPL CALCULATED.3IONS-SCNC: 14 MMOL/L (ref 9–17)
BASOPHILS # BLD: 1 % (ref 0–2)
BASOPHILS ABSOLUTE: 0.06 K/UL (ref 0–0.2)
BUN BLDV-MCNC: 22 MG/DL (ref 8–23)
BUN/CREAT BLD: NORMAL (ref 9–20)
CALCIUM SERPL-MCNC: 8.9 MG/DL (ref 8.6–10.4)
CHLORIDE BLD-SCNC: 100 MMOL/L (ref 98–107)
CO2: 22 MMOL/L (ref 20–31)
CREAT SERPL-MCNC: 0.95 MG/DL (ref 0.7–1.2)
DIFFERENTIAL TYPE: ABNORMAL
EOSINOPHILS RELATIVE PERCENT: 1 % (ref 1–4)
GFR AFRICAN AMERICAN: >60 ML/MIN
GFR NON-AFRICAN AMERICAN: >60 ML/MIN
GFR SERPL CREATININE-BSD FRML MDRD: NORMAL ML/MIN/{1.73_M2}
GFR SERPL CREATININE-BSD FRML MDRD: NORMAL ML/MIN/{1.73_M2}
GLUCOSE BLD-MCNC: 85 MG/DL (ref 70–99)
HCT VFR BLD CALC: 27.9 % (ref 40.7–50.3)
HCT VFR BLD CALC: 31.1 % (ref 40.7–50.3)
HEMOGLOBIN: 10.1 G/DL (ref 13–17)
HEMOGLOBIN: 9.1 G/DL (ref 13–17)
IMMATURE GRANULOCYTES: 0 %
INR BLD: 1
LYMPHOCYTES # BLD: 22 % (ref 24–43)
MCH RBC QN AUTO: 33.1 PG (ref 25.2–33.5)
MCH RBC QN AUTO: 33.3 PG (ref 25.2–33.5)
MCHC RBC AUTO-ENTMCNC: 32.5 G/DL (ref 28.4–34.8)
MCHC RBC AUTO-ENTMCNC: 32.6 G/DL (ref 28.4–34.8)
MCV RBC AUTO: 101.5 FL (ref 82.6–102.9)
MCV RBC AUTO: 102.6 FL (ref 82.6–102.9)
MONOCYTES # BLD: 10 % (ref 3–12)
NRBC AUTOMATED: 0 PER 100 WBC
NRBC AUTOMATED: 0 PER 100 WBC
PARTIAL THROMBOPLASTIN TIME: 24.2 SEC (ref 20.5–30.5)
PDW BLD-RTO: 13 % (ref 11.8–14.4)
PDW BLD-RTO: 13.1 % (ref 11.8–14.4)
PLATELET # BLD: 167 K/UL (ref 138–453)
PLATELET # BLD: 209 K/UL (ref 138–453)
PLATELET ESTIMATE: ABNORMAL
PMV BLD AUTO: 10.1 FL (ref 8.1–13.5)
PMV BLD AUTO: 9.9 FL (ref 8.1–13.5)
POTASSIUM SERPL-SCNC: 4.9 MMOL/L (ref 3.7–5.3)
PROTHROMBIN TIME: 10.4 SEC (ref 9–12)
RBC # BLD: 2.75 M/UL (ref 4.21–5.77)
RBC # BLD: 3.03 M/UL (ref 4.21–5.77)
RBC # BLD: ABNORMAL 10*6/UL
SARS-COV-2, PCR: NORMAL
SARS-COV-2, RAPID: NOT DETECTED
SARS-COV-2: NORMAL
SEG NEUTROPHILS: 66 % (ref 36–65)
SEGMENTED NEUTROPHILS ABSOLUTE COUNT: 5.05 K/UL (ref 1.5–8.1)
SODIUM BLD-SCNC: 136 MMOL/L (ref 135–144)
SOURCE: NORMAL
WBC # BLD: 5.7 K/UL (ref 3.5–11.3)
WBC # BLD: 7.5 K/UL (ref 3.5–11.3)
WBC # BLD: ABNORMAL 10*3/UL

## 2020-08-03 PROCEDURE — G0378 HOSPITAL OBSERVATION PER HR: HCPCS

## 2020-08-03 PROCEDURE — 7100000000 HC PACU RECOVERY - FIRST 15 MIN: Performed by: UROLOGY

## 2020-08-03 PROCEDURE — 85730 THROMBOPLASTIN TIME PARTIAL: CPT

## 2020-08-03 PROCEDURE — 96365 THER/PROPH/DIAG IV INF INIT: CPT

## 2020-08-03 PROCEDURE — 2500000003 HC RX 250 WO HCPCS: Performed by: SPECIALIST

## 2020-08-03 PROCEDURE — 85027 COMPLETE CBC AUTOMATED: CPT

## 2020-08-03 PROCEDURE — 85025 COMPLETE CBC W/AUTO DIFF WBC: CPT

## 2020-08-03 PROCEDURE — 6360000002 HC RX W HCPCS: Performed by: STUDENT IN AN ORGANIZED HEALTH CARE EDUCATION/TRAINING PROGRAM

## 2020-08-03 PROCEDURE — 96375 TX/PRO/DX INJ NEW DRUG ADDON: CPT

## 2020-08-03 PROCEDURE — 3600000003 HC SURGERY LEVEL 3 BASE: Performed by: UROLOGY

## 2020-08-03 PROCEDURE — 3600000013 HC SURGERY LEVEL 3 ADDTL 15MIN: Performed by: UROLOGY

## 2020-08-03 PROCEDURE — 6370000000 HC RX 637 (ALT 250 FOR IP): Performed by: STUDENT IN AN ORGANIZED HEALTH CARE EDUCATION/TRAINING PROGRAM

## 2020-08-03 PROCEDURE — 80048 BASIC METABOLIC PNL TOTAL CA: CPT

## 2020-08-03 PROCEDURE — 2580000003 HC RX 258: Performed by: STUDENT IN AN ORGANIZED HEALTH CARE EDUCATION/TRAINING PROGRAM

## 2020-08-03 PROCEDURE — 2580000003 HC RX 258: Performed by: UROLOGY

## 2020-08-03 PROCEDURE — 7100000001 HC PACU RECOVERY - ADDTL 15 MIN: Performed by: UROLOGY

## 2020-08-03 PROCEDURE — 2580000003 HC RX 258: Performed by: SPECIALIST

## 2020-08-03 PROCEDURE — U0002 COVID-19 LAB TEST NON-CDC: HCPCS

## 2020-08-03 PROCEDURE — 3700000000 HC ANESTHESIA ATTENDED CARE: Performed by: UROLOGY

## 2020-08-03 PROCEDURE — 96372 THER/PROPH/DIAG INJ SC/IM: CPT

## 2020-08-03 PROCEDURE — 99284 EMERGENCY DEPT VISIT MOD MDM: CPT

## 2020-08-03 PROCEDURE — 85610 PROTHROMBIN TIME: CPT

## 2020-08-03 PROCEDURE — 2500000003 HC RX 250 WO HCPCS: Performed by: STUDENT IN AN ORGANIZED HEALTH CARE EDUCATION/TRAINING PROGRAM

## 2020-08-03 PROCEDURE — 3700000001 HC ADD 15 MINUTES (ANESTHESIA): Performed by: UROLOGY

## 2020-08-03 PROCEDURE — 6360000002 HC RX W HCPCS: Performed by: SPECIALIST

## 2020-08-03 PROCEDURE — 2709999900 HC NON-CHARGEABLE SUPPLY: Performed by: UROLOGY

## 2020-08-03 PROCEDURE — 6360000002 HC RX W HCPCS: Performed by: NURSE ANESTHETIST, CERTIFIED REGISTERED

## 2020-08-03 RX ORDER — MIDAZOLAM HYDROCHLORIDE 1 MG/ML
2 INJECTION INTRAMUSCULAR; INTRAVENOUS
Status: CANCELLED | OUTPATIENT
Start: 2020-08-03 | End: 2020-08-03

## 2020-08-03 RX ORDER — LIDOCAINE HYDROCHLORIDE 10 MG/ML
10 INJECTION, SOLUTION INFILTRATION; PERINEURAL ONCE
Status: COMPLETED | OUTPATIENT
Start: 2020-08-03 | End: 2020-08-03

## 2020-08-03 RX ORDER — IBUPROFEN 400 MG/1
400 TABLET ORAL ONCE
Status: COMPLETED | OUTPATIENT
Start: 2020-08-03 | End: 2020-08-03

## 2020-08-03 RX ORDER — PROPOFOL 10 MG/ML
INJECTION, EMULSION INTRAVENOUS PRN
Status: DISCONTINUED | OUTPATIENT
Start: 2020-08-03 | End: 2020-08-03 | Stop reason: SDUPTHER

## 2020-08-03 RX ORDER — ONDANSETRON 2 MG/ML
INJECTION INTRAMUSCULAR; INTRAVENOUS PRN
Status: DISCONTINUED | OUTPATIENT
Start: 2020-08-03 | End: 2020-08-03 | Stop reason: SDUPTHER

## 2020-08-03 RX ORDER — SODIUM CHLORIDE 0.9 % (FLUSH) 0.9 %
10 SYRINGE (ML) INJECTION EVERY 12 HOURS SCHEDULED
Status: DISCONTINUED | OUTPATIENT
Start: 2020-08-03 | End: 2020-08-04 | Stop reason: HOSPADM

## 2020-08-03 RX ORDER — FENTANYL CITRATE 50 UG/ML
50 INJECTION, SOLUTION INTRAMUSCULAR; INTRAVENOUS EVERY 5 MIN PRN
Status: DISCONTINUED | OUTPATIENT
Start: 2020-08-03 | End: 2020-08-03 | Stop reason: HOSPADM

## 2020-08-03 RX ORDER — ROSUVASTATIN CALCIUM 20 MG/1
20 TABLET, COATED ORAL NIGHTLY
Status: DISCONTINUED | OUTPATIENT
Start: 2020-08-03 | End: 2020-08-04 | Stop reason: HOSPADM

## 2020-08-03 RX ORDER — ONDANSETRON 2 MG/ML
4 INJECTION INTRAMUSCULAR; INTRAVENOUS EVERY 6 HOURS PRN
Status: DISCONTINUED | OUTPATIENT
Start: 2020-08-03 | End: 2020-08-04 | Stop reason: HOSPADM

## 2020-08-03 RX ORDER — LISINOPRIL 10 MG/1
10 TABLET ORAL DAILY
Status: DISCONTINUED | OUTPATIENT
Start: 2020-08-03 | End: 2020-08-04 | Stop reason: HOSPADM

## 2020-08-03 RX ORDER — KETOROLAC TROMETHAMINE 15 MG/ML
15 INJECTION, SOLUTION INTRAMUSCULAR; INTRAVENOUS ONCE
Status: COMPLETED | OUTPATIENT
Start: 2020-08-03 | End: 2020-08-03

## 2020-08-03 RX ORDER — ACETAMINOPHEN 650 MG/1
650 SUPPOSITORY RECTAL EVERY 6 HOURS PRN
Status: DISCONTINUED | OUTPATIENT
Start: 2020-08-03 | End: 2020-08-04 | Stop reason: HOSPADM

## 2020-08-03 RX ORDER — CEFAZOLIN SODIUM 1 G/3ML
INJECTION, POWDER, FOR SOLUTION INTRAMUSCULAR; INTRAVENOUS PRN
Status: DISCONTINUED | OUTPATIENT
Start: 2020-08-03 | End: 2020-08-03 | Stop reason: SDUPTHER

## 2020-08-03 RX ORDER — ONDANSETRON 2 MG/ML
4 INJECTION INTRAMUSCULAR; INTRAVENOUS
Status: DISCONTINUED | OUTPATIENT
Start: 2020-08-03 | End: 2020-08-03 | Stop reason: HOSPADM

## 2020-08-03 RX ORDER — ONDANSETRON 2 MG/ML
4 INJECTION INTRAMUSCULAR; INTRAVENOUS ONCE
Status: CANCELLED | OUTPATIENT
Start: 2020-08-03 | End: 2020-08-03

## 2020-08-03 RX ORDER — ONDANSETRON 4 MG/1
4 TABLET, ORALLY DISINTEGRATING ORAL ONCE
Status: COMPLETED | OUTPATIENT
Start: 2020-08-03 | End: 2020-08-03

## 2020-08-03 RX ORDER — MAGNESIUM HYDROXIDE 1200 MG/15ML
LIQUID ORAL CONTINUOUS PRN
Status: COMPLETED | OUTPATIENT
Start: 2020-08-03 | End: 2020-08-03

## 2020-08-03 RX ORDER — HYDROCODONE BITARTRATE AND ACETAMINOPHEN 5; 325 MG/1; MG/1
1 TABLET ORAL EVERY 6 HOURS PRN
Status: DISCONTINUED | OUTPATIENT
Start: 2020-08-03 | End: 2020-08-04 | Stop reason: HOSPADM

## 2020-08-03 RX ORDER — FENTANYL CITRATE 50 UG/ML
25 INJECTION, SOLUTION INTRAMUSCULAR; INTRAVENOUS ONCE
Status: CANCELLED | OUTPATIENT
Start: 2020-08-03 | End: 2020-08-03

## 2020-08-03 RX ORDER — ACETAMINOPHEN 500 MG
1000 TABLET ORAL ONCE
Status: COMPLETED | OUTPATIENT
Start: 2020-08-03 | End: 2020-08-03

## 2020-08-03 RX ORDER — MORPHINE SULFATE 4 MG/ML
4 INJECTION, SOLUTION INTRAMUSCULAR; INTRAVENOUS ONCE
Status: COMPLETED | OUTPATIENT
Start: 2020-08-03 | End: 2020-08-03

## 2020-08-03 RX ORDER — LISINOPRIL 20 MG/1
20 TABLET ORAL DAILY
Status: DISCONTINUED | OUTPATIENT
Start: 2020-08-03 | End: 2020-08-04 | Stop reason: HOSPADM

## 2020-08-03 RX ORDER — SODIUM CHLORIDE 0.9 % (FLUSH) 0.9 %
10 SYRINGE (ML) INJECTION PRN
Status: CANCELLED | OUTPATIENT
Start: 2020-08-03

## 2020-08-03 RX ORDER — SODIUM CHLORIDE, SODIUM LACTATE, POTASSIUM CHLORIDE, CALCIUM CHLORIDE 600; 310; 30; 20 MG/100ML; MG/100ML; MG/100ML; MG/100ML
INJECTION, SOLUTION INTRAVENOUS CONTINUOUS
Status: CANCELLED | OUTPATIENT
Start: 2020-08-03

## 2020-08-03 RX ORDER — NITROGLYCERIN 0.4 MG/1
0.4 TABLET SUBLINGUAL EVERY 5 MIN PRN
Status: DISCONTINUED | OUTPATIENT
Start: 2020-08-03 | End: 2020-08-04 | Stop reason: HOSPADM

## 2020-08-03 RX ORDER — ACETAMINOPHEN 325 MG/1
650 TABLET ORAL EVERY 6 HOURS PRN
Status: DISCONTINUED | OUTPATIENT
Start: 2020-08-03 | End: 2020-08-04 | Stop reason: HOSPADM

## 2020-08-03 RX ORDER — LIDOCAINE HYDROCHLORIDE 10 MG/ML
INJECTION, SOLUTION EPIDURAL; INFILTRATION; INTRACAUDAL; PERINEURAL PRN
Status: DISCONTINUED | OUTPATIENT
Start: 2020-08-03 | End: 2020-08-03 | Stop reason: SDUPTHER

## 2020-08-03 RX ORDER — SODIUM CHLORIDE 0.9 % (FLUSH) 0.9 %
10 SYRINGE (ML) INJECTION EVERY 12 HOURS SCHEDULED
Status: CANCELLED | OUTPATIENT
Start: 2020-08-03

## 2020-08-03 RX ORDER — FENTANYL CITRATE 50 UG/ML
INJECTION, SOLUTION INTRAMUSCULAR; INTRAVENOUS PRN
Status: DISCONTINUED | OUTPATIENT
Start: 2020-08-03 | End: 2020-08-03 | Stop reason: SDUPTHER

## 2020-08-03 RX ORDER — POLYETHYLENE GLYCOL 3350 17 G/17G
17 POWDER, FOR SOLUTION ORAL DAILY PRN
Status: DISCONTINUED | OUTPATIENT
Start: 2020-08-03 | End: 2020-08-04 | Stop reason: HOSPADM

## 2020-08-03 RX ORDER — PHENYLEPHRINE HYDROCHLORIDE 10 MG/ML
INJECTION INTRAVENOUS PRN
Status: DISCONTINUED | OUTPATIENT
Start: 2020-08-03 | End: 2020-08-03 | Stop reason: SDUPTHER

## 2020-08-03 RX ORDER — CARVEDILOL 6.25 MG/1
6.25 TABLET ORAL DAILY
Status: DISCONTINUED | OUTPATIENT
Start: 2020-08-03 | End: 2020-08-04 | Stop reason: HOSPADM

## 2020-08-03 RX ORDER — SODIUM CHLORIDE 9 MG/ML
INJECTION, SOLUTION INTRAVENOUS CONTINUOUS
Status: DISCONTINUED | OUTPATIENT
Start: 2020-08-03 | End: 2020-08-04

## 2020-08-03 RX ORDER — SODIUM CHLORIDE, SODIUM LACTATE, POTASSIUM CHLORIDE, CALCIUM CHLORIDE 600; 310; 30; 20 MG/100ML; MG/100ML; MG/100ML; MG/100ML
INJECTION, SOLUTION INTRAVENOUS CONTINUOUS PRN
Status: DISCONTINUED | OUTPATIENT
Start: 2020-08-03 | End: 2020-08-03 | Stop reason: SDUPTHER

## 2020-08-03 RX ORDER — GLYCOPYRROLATE 1 MG/5 ML
SYRINGE (ML) INTRAVENOUS PRN
Status: DISCONTINUED | OUTPATIENT
Start: 2020-08-03 | End: 2020-08-03 | Stop reason: SDUPTHER

## 2020-08-03 RX ORDER — SODIUM CHLORIDE 0.9 % (FLUSH) 0.9 %
10 SYRINGE (ML) INJECTION PRN
Status: DISCONTINUED | OUTPATIENT
Start: 2020-08-03 | End: 2020-08-04 | Stop reason: HOSPADM

## 2020-08-03 RX ORDER — HYDROCODONE BITARTRATE AND ACETAMINOPHEN 5; 325 MG/1; MG/1
2 TABLET ORAL EVERY 6 HOURS PRN
Status: DISCONTINUED | OUTPATIENT
Start: 2020-08-03 | End: 2020-08-04 | Stop reason: HOSPADM

## 2020-08-03 RX ORDER — PHENYLEPHRINE HYDROCHLORIDE 10 MG/ML
200 INJECTION INTRAVENOUS ONCE
Status: COMPLETED | OUTPATIENT
Start: 2020-08-03 | End: 2020-08-03

## 2020-08-03 RX ADMIN — FENTANYL CITRATE 50 MCG: 50 INJECTION INTRAMUSCULAR; INTRAVENOUS at 16:43

## 2020-08-03 RX ADMIN — LIDOCAINE HYDROCHLORIDE 50 MG: 10 INJECTION, SOLUTION EPIDURAL; INFILTRATION; INTRACAUDAL; PERINEURAL at 16:13

## 2020-08-03 RX ADMIN — ONDANSETRON 4 MG: 2 INJECTION, SOLUTION INTRAMUSCULAR; INTRAVENOUS at 16:49

## 2020-08-03 RX ADMIN — PHENYLEPHRINE HYDROCHLORIDE 200 MCG: 10 INJECTION INTRAVENOUS at 16:23

## 2020-08-03 RX ADMIN — CEFAZOLIN 2000 MG: 1 INJECTION, POWDER, FOR SOLUTION INTRAMUSCULAR; INTRAVENOUS at 16:29

## 2020-08-03 RX ADMIN — MORPHINE SULFATE 4 MG: 4 INJECTION INTRAVENOUS at 08:10

## 2020-08-03 RX ADMIN — SODIUM CHLORIDE, POTASSIUM CHLORIDE, SODIUM LACTATE AND CALCIUM CHLORIDE: 600; 310; 30; 20 INJECTION, SOLUTION INTRAVENOUS at 16:50

## 2020-08-03 RX ADMIN — ONDANSETRON 4 MG: 4 TABLET, ORALLY DISINTEGRATING ORAL at 08:10

## 2020-08-03 RX ADMIN — IBUPROFEN 400 MG: 400 TABLET, FILM COATED ORAL at 08:10

## 2020-08-03 RX ADMIN — KETOROLAC TROMETHAMINE 15 MG: 15 INJECTION, SOLUTION INTRAMUSCULAR; INTRAVENOUS at 11:07

## 2020-08-03 RX ADMIN — ACETAMINOPHEN 1000 MG: 500 TABLET ORAL at 08:10

## 2020-08-03 RX ADMIN — PHENYLEPHRINE HYDROCHLORIDE 200 MCG: 10 INJECTION INTRAVENOUS at 16:42

## 2020-08-03 RX ADMIN — PROPOFOL 30 MG: 10 INJECTION, EMULSION INTRAVENOUS at 16:43

## 2020-08-03 RX ADMIN — PHENYLEPHRINE HYDROCHLORIDE 0.2 MG: 10 INJECTION INTRAVENOUS at 09:10

## 2020-08-03 RX ADMIN — HYDROCODONE BITARTRATE AND ACETAMINOPHEN 1 TABLET: 5; 325 TABLET ORAL at 21:09

## 2020-08-03 RX ADMIN — PHENYLEPHRINE HYDROCHLORIDE 200 MCG: 10 INJECTION INTRAVENOUS at 16:21

## 2020-08-03 RX ADMIN — FENTANYL CITRATE 50 MCG: 50 INJECTION INTRAMUSCULAR; INTRAVENOUS at 16:13

## 2020-08-03 RX ADMIN — PHENYLEPHRINE HYDROCHLORIDE 300 MCG: 10 INJECTION INTRAVENOUS at 16:34

## 2020-08-03 RX ADMIN — DEXTROSE MONOHYDRATE 1 G: 5 INJECTION INTRAVENOUS at 21:57

## 2020-08-03 RX ADMIN — PHENYLEPHRINE HYDROCHLORIDE 200 MCG: 10 INJECTION INTRAVENOUS at 16:30

## 2020-08-03 RX ADMIN — SODIUM CHLORIDE: 9 INJECTION, SOLUTION INTRAVENOUS at 18:32

## 2020-08-03 RX ADMIN — PROPOFOL 150 MG: 10 INJECTION, EMULSION INTRAVENOUS at 16:13

## 2020-08-03 RX ADMIN — MORPHINE SULFATE 4 MG: 4 INJECTION INTRAVENOUS at 11:07

## 2020-08-03 RX ADMIN — LIDOCAINE HYDROCHLORIDE 10 ML: 10 INJECTION, SOLUTION INFILTRATION; PERINEURAL at 09:10

## 2020-08-03 RX ADMIN — Medication 0.2 MG: at 16:27

## 2020-08-03 RX ADMIN — SODIUM CHLORIDE, POTASSIUM CHLORIDE, SODIUM LACTATE AND CALCIUM CHLORIDE: 600; 310; 30; 20 INJECTION, SOLUTION INTRAVENOUS at 16:06

## 2020-08-03 RX ADMIN — BENZOCAINE AND MENTHOL 1 LOZENGE: 15; 3.6 LOZENGE ORAL at 21:58

## 2020-08-03 RX ADMIN — PHENYLEPHRINE HYDROCHLORIDE 200 MCG: 10 INJECTION INTRAVENOUS at 16:27

## 2020-08-03 RX ADMIN — DEXTROSE MONOHYDRATE 2 G: 50 INJECTION, SOLUTION INTRAVENOUS at 09:44

## 2020-08-03 RX ADMIN — ROSUVASTATIN CALCIUM 20 MG: 20 TABLET, FILM COATED ORAL at 21:09

## 2020-08-03 ASSESSMENT — PAIN DESCRIPTION - PAIN TYPE
TYPE: ACUTE PAIN
TYPE: ACUTE PAIN

## 2020-08-03 ASSESSMENT — PULMONARY FUNCTION TESTS
PIF_VALUE: 7
PIF_VALUE: 8
PIF_VALUE: 7
PIF_VALUE: 18
PIF_VALUE: 5
PIF_VALUE: 7
PIF_VALUE: 6
PIF_VALUE: 25
PIF_VALUE: 6
PIF_VALUE: 6
PIF_VALUE: 22
PIF_VALUE: 10
PIF_VALUE: 18
PIF_VALUE: 7
PIF_VALUE: 1
PIF_VALUE: 8
PIF_VALUE: 7
PIF_VALUE: 3
PIF_VALUE: 0
PIF_VALUE: 1
PIF_VALUE: 1
PIF_VALUE: 7
PIF_VALUE: 6
PIF_VALUE: 8
PIF_VALUE: 3
PIF_VALUE: 8
PIF_VALUE: 18
PIF_VALUE: 10
PIF_VALUE: 4
PIF_VALUE: 1
PIF_VALUE: 7
PIF_VALUE: 8
PIF_VALUE: 1
PIF_VALUE: 7
PIF_VALUE: 8
PIF_VALUE: 8
PIF_VALUE: 7
PIF_VALUE: 7
PIF_VALUE: 16
PIF_VALUE: 7
PIF_VALUE: 6
PIF_VALUE: 7
PIF_VALUE: 8
PIF_VALUE: 17
PIF_VALUE: 18
PIF_VALUE: 7
PIF_VALUE: 3
PIF_VALUE: 7
PIF_VALUE: 8
PIF_VALUE: 7

## 2020-08-03 ASSESSMENT — PAIN SCALES - GENERAL
PAINLEVEL_OUTOF10: 6
PAINLEVEL_OUTOF10: 0
PAINLEVEL_OUTOF10: 6
PAINLEVEL_OUTOF10: 5
PAINLEVEL_OUTOF10: 7
PAINLEVEL_OUTOF10: 0
PAINLEVEL_OUTOF10: 10

## 2020-08-03 ASSESSMENT — ENCOUNTER SYMPTOMS
SORE THROAT: 0
ABDOMINAL PAIN: 0
SHORTNESS OF BREATH: 0
VOMITING: 0
NAUSEA: 0
COUGH: 0

## 2020-08-03 ASSESSMENT — PAIN DESCRIPTION - DESCRIPTORS: DESCRIPTORS: CONSTANT

## 2020-08-03 ASSESSMENT — PAIN DESCRIPTION - LOCATION
LOCATION: PENIS
LOCATION: PENIS

## 2020-08-03 NOTE — ED NOTES
Dr. Anne Muñoz with Urology at bedside for second attempt to aspirate priapism.      Alex Murrieta RN  08/03/20 5784

## 2020-08-03 NOTE — ANESTHESIA POSTPROCEDURE EVALUATION
Department of Anesthesiology  Postprocedure Note    Patient: Carmenza Sanders  MRN: [id number]  YOB: 1950  Date of evaluation: 8/3/2020  Time:  6:07 PM     Procedure Summary     Date:  08/03/20 Room / Location:  [address]    Anesthesia Start:  0543 Anesthesia Stop:  7776    Procedure:  PENILE DISTAL SHUNT INSERTION (N/A Penis) Diagnosis:  (PRIAPISM)    Surgeon:  Jim Feliciano MD Responsible Provider:  Seth Del Rosario MD    Anesthesia Type:  general ASA Status:  3          Anesthesia Type: general    Mary Phase I: Mary Score: 9    Mary Phase II:      Last vitals: Reviewed and per EMR flowsheets.        Anesthesia Post Evaluation    Patient location during evaluation: PACU  Patient participation: complete - patient participated  Level of consciousness: awake  Pain score: 3  Airway patency: patent  Nausea & Vomiting: no vomiting and no nausea  Complications: no  Cardiovascular status: blood pressure returned to baseline  Respiratory status: acceptable  Hydration status: euvolemic

## 2020-08-03 NOTE — OP NOTE
Operative Note      Patient: Bryan Fry  YOB: 1950  MRN: [id number]    Date of Procedure: 8/3/2020    Pre-Op Diagnosis: Ischemic priapism. Post-Op Diagnosis: Same       Procedure(s):  Distal penile shunt, Winter shunt. Surgeon(s):  Teagan Boyd MD    Assistant:   Resident: Shukri Ortiz MD    Anesthesia: General    Estimated Blood Loss (mL): less than 50     Complications: None    Specimens:   * No specimens in log *    Implants:  * No implants in log *      Drains:   Urethral Catheter Straight-tip; Non-latex 16 fr (Active)   $ Urethral catheter insertion Inserted for procedure 08/03/20 1705   Catheter Indications Urology/Urologist seeing this patient or inserted indwelling catheter 08/03/20 1730   Securement Device Date Changed 08/03/20 08/03/20 1705   Urine Color Yellow 08/03/20 1730   Urine Appearance Clear 08/03/20 1730   Output (mL) 50 mL 08/03/20 1730       Findings: Ischemic priapism     Detailed Description of Procedure:   After informed consent was obtained the preoperative area the patient is intact the operating. He is transferred the operative table supine position. EPC cuffs were placed. Machine was turned on. Anesthesia was induced in March administered. Timeout occurred in which she patient identifiers were used. He sterilely prepped and draped in a standard fashion. At this point the penis was evaluated. It was still erect and painful. At this point we obtained a Mike-Cut biopsy prostate biopsy needle. We then performed a glandular corporal shunt at the distal aspect of the glans. Prior to doing this a 12 Norwegian red rubber was placed into the urethra to protect it. Roughly 7 cores were taken on each side for a total of 14. The old, venous blood was then milked out of the penile shaft. At this point the penis was flaccid. This completed the procedure.   Patient was then awakened, extubated, and discharged back to hospital recovery room in good and stable

## 2020-08-03 NOTE — ED TRIAGE NOTES
Pt presents to ED c/o priapism since yesterday morning around 0900. Pt denies taking any Viagra or any other medications that may have caused this to happen. Pt states it has happened to him once before and had blood aspirated out of his penis. Pt c/o discomfort, urinating with no complaints. Pt changed into gown, awaiting physician evaluation. Will continue to monitor.

## 2020-08-03 NOTE — CONSULTS
Fadia Olivares, [address] [facility], [place], & Roger  Urology Consultation      Patient:  Cassandra Crump  MRN: [id number]  YOB: 1950    CHIEF COMPLAINT:  Priapism     HISTORY OF PRESENT ILLNESS:   The patient is a 65 y.o. male who presents with priapism that started yesterday around 9 am. He tried having sexual intercourse, however ejaculation did not bring down the erection. He has had this happen before in September 2018, when he had injection of phenylephrine in the ED. He denies sickle cell trait. He denies using erectile dysfunction medications such as Viagra other phosphodiesterase inhibitors, cocaine, penile injections. Patient reports having adequate erections for sexual encounters on almost 10 out of 10 times. He describes no firmness for penetration. The patient stated the direction was extremely painful. He is not having shortness of breath or chest pain or nausea. The patient did not follow up in urology clinic after priapism treatment in 2018, he denies urinary tract infections difficulty with his urinary stream blood in his urine, bladder tumors, prostate cancer, renal cancer. He does not have a family history of sickle cell anemia or sickle cell trait. Patient's old records, notes and chart reviewed and summarized above.     Past Medical History:    Past Medical History:   Diagnosis Date    CAD (coronary artery disease)     Hyperlipidemia     Hypertension        Past Surgical History:    Past Surgical History:   Procedure Laterality Date    ANKLE SURGERY      bilateral, pt states over 2 years ago    COLONOSCOPY      CORONARY ANGIOPLASTY WITH STENT PLACEMENT  2014    PTCA  2014       Medications:      Current Facility-Administered Medications:     phenylephrine (VAZCULEP) injection 0.2 mg, 200 mcg, Intramuscular, Once, Harvie Duverney, MD    phenylephrine (AVNI-SYNEPHRINE) 0.5 mg in sodium chloride 0.9 % 9.95 mL IV syringe, , Intravenous, PRN, Slava Roberts,     lidocaine 1 % injection 10 mL, 10 mL, Intradermal, Once, Prisca Hassan MD    Current Outpatient Medications:     lisinopril (PRINIVIL;ZESTRIL) 10 MG tablet, Take 1 tablet by mouth daily, Disp: 30 tablet, Rfl: 5    rosuvastatin (CRESTOR) 20 MG tablet, Take 1 tablet by mouth nightly, Disp: 30 tablet, Rfl: 5    lisinopril (PRINIVIL;ZESTRIL) 20 MG tablet, Take 1 tablet by mouth daily (Patient not taking: Reported on 2020), Disp: 30 tablet, Rfl: 5    carvedilol (COREG) 6.25 MG tablet, take 1 tablet by mouth twice a day with meals, Disp: 60 tablet, Rfl: 5    aspirin EC 81 MG EC tablet, Take 1 tablet by mouth daily, Disp: 30 tablet, Rfl: 5    nitroGLYCERIN (NITROSTAT) 0.4 MG SL tablet, One q 5 min prn chest pain, for up to three doses. Seek medical attention after 3 doses. , Disp: 25 tablet, Rfl: 0    Allergies:  No Known Allergies    Social History:   Social History     Socioeconomic History    Marital status: Single     Spouse name: Not on file    Number of children: Not on file    Years of education: Not on file    Highest education level: Not on file   Occupational History    Not on file   Social Needs    Financial resource strain: Not on file    Food insecurity     Worry: Not on file     Inability: Not on file    Transportation needs     Medical: Not on file     Non-medical: Not on file   Tobacco Use    Smoking status: Former Smoker     Packs/day: 0.25     Years: 20.00     Pack years: 5.00     Last attempt to quit: 1995     Years since quittin.5    Smokeless tobacco: Never Used   Substance and Sexual Activity    Alcohol use: Yes     Comment: 2x a week     Drug use: No    Sexual activity: Not on file   Lifestyle    Physical activity     Days per week: Not on file     Minutes per session: Not on file    Stress: Not on file   Relationships    Social connections     Talks on phone: Not on file     Gets together: Not on file     Attends Protestant service: Not on file     Active member of club or organization: Not on file     Attends meetings of clubs or organizations: Not on file     Relationship status: Not on file    Intimate partner violence     Fear of current or ex partner: Not on file     Emotionally abused: Not on file     Physically abused: Not on file     Forced sexual activity: Not on file   Other Topics Concern    Not on file   Social History Narrative    Not on file       Family History:  History reviewed. No pertinent family history. REVIEW OF SYSTEMS:  A comprehensive 14 point review of systems was obtained. Constitutional: No fatigue  Eyes: No blurry vision  Ears, nose, mouth, throat, face: No ringing in the ears; no facial droop. Respiratory: No cough or cold. Cardiovascular: No palpitations  Gastrointestinal: No diarrhea or constipation. Genitourinary: No burning with urination  Integument/Skin: No rashes  Hematologic/Lymphatic: No easy bruising  Musculoskeletal: No muscle pains  Neurologic: No weakness in the extremities. Psychiatric: No depression or suicidal thoughts. Endocrine: No heat or cold intolerances. Allergic/Immunologic: No current seasonal allergies; no skin hives. Physical Exam:    This a 65 y.o. male   Vitals:    08/03/20 0747   BP: (!) 142/87   Pulse: 91   Resp: 12   Temp: 98.3 °F (36.8 °C)   SpO2: 97%     Constitutional: Patient in no acute distress. Neuro: alert and oriented to person place and time. Head: Atraumatic and normocephalic. Neck: Trachea midline   Ext: 2+ radial pulses bilaterally. Psych: Mood and affect normal.  Skin: No rashes or bruising present. Lungs: Respiratory effort normal.  Cardiovascular:  Regular rhythm. Abdomen: Soft, non-tender, non-distended. Neither side has CVA tenderness on exam.  Bladder non-tender and not distended. Lymphatics: no palpable lymphadenopathy.   Penis - rigid erection noted/ tender to palpation   Urethral meatus normal  Scrotal exam normal      Labs:  No results for input(s): WBC, HGB, HCT, MCV, PLT in the last 72 hours. No results for input(s): NA, K, CL, CO2, PHOS, BUN, CREATININE in the last 72 hours. Invalid input(s): CA    No results for input(s): COLORU, PHUR, LABCAST, WBCUA, RBCUA, MUCUS, TRICHOMONAS, YEAST, BACTERIA, CLARITYU, SPECGRAV, LEUKOCYTESUR, UROBILINOGEN, [place] in the last 72 hours. Invalid input(s): NITRATE, GLUCOSEUKETONESUAMORPHOUS    Culture Results:  [unfilled]      -----------------------------------------------------------------  Imaging Results:  No results found. Assessment and Plan   Impression:    Recurrent ischemic priapism - 2nd episode , first in September 2018, treated with aspiration and phenylephrine injection   CAD / S/P stent in 2014, on Glenda Arita / coagulopathic       Plan:   2g ancef for antibiotic prophylaxis   Under sterile precautions , aspiration and irrigation with phenylephrine attempted  Monitor for tachycardia and hypotension throughout the procedure   Morphine iv for pain control   Lidocaine 1% for local anesthesia       Detumescence not achieved, will plan for distal corpora glanular shunt   Rapid covid test / CBC, BMP   To be scheduled after discussion with Dr. Wilber Guillaume         Thank you for involving us in the care of [facility].. Should you have any questions, please do not hesitate to contact us at any time.     Alba Bearden  8:51 AM 8/3/2020

## 2020-08-03 NOTE — ANESTHESIA PRE PROCEDURE
Department of Anesthesiology  Preprocedure Note       Name:  Raheem Shore   Age:  65 y.o.  :  1950                                          MRN:  [id number]         Date:  8/3/2020      Surgeon: Jessica Degree):  Vini Pete MD    Procedure: Procedure(s):  PENILE DISTAL SHUNT INSERTION    Medications prior to admission:   Prior to Admission medications    Medication Sig Start Date End Date Taking? Authorizing Provider   lisinopril (PRINIVIL;ZESTRIL) 10 MG tablet Take 1 tablet by mouth daily 20  Yes Sonia Valencia MD   rosuvastatin (CRESTOR) 20 MG tablet Take 1 tablet by mouth nightly 20  Yes Justino Coe MD   carvedilol (COREG) 6.25 MG tablet take 1 tablet by mouth twice a day with meals 20  Yes Justino Coe MD   aspirin EC 81 MG EC tablet Take 1 tablet by mouth daily 20  Yes Justino Coe MD   nitroGLYCERIN (NITROSTAT) 0.4 MG SL tablet One q 5 min prn chest pain, for up to three doses. Seek medical attention after 3 doses.  19  Yes Linda Stevenson MD   lisinopril (PRINIVIL;ZESTRIL) 20 MG tablet Take 1 tablet by mouth daily  Patient not taking: Reported on 2020   Justino Coe MD       Current medications:    Current Facility-Administered Medications   Medication Dose Route Frequency Provider Last Rate Last Dose    phenylephrine (AVNI-SYNEPHRINE) 0.5 mg in sodium chloride 0.9 % 9.95 mL IV syringe   Intravenous PRN Cami Reyes DO        ondansetron [facility]) injection 4 mg  4 mg Intravenous Once PRN Stephanie Garza MD        fentaNYL (SUBLIMAZE) injection 50 mcg  50 mcg Intravenous Q5 Min PRN Stephanie Garza MD         Current Outpatient Medications   Medication Sig Dispense Refill    lisinopril (PRINIVIL;ZESTRIL) 10 MG tablet Take 1 tablet by mouth daily 30 tablet 5    rosuvastatin (CRESTOR) 20 MG tablet Take 1 tablet by mouth nightly 30 tablet 5    carvedilol (COREG) 6.25 MG tablet take 1 tablet by mouth twice a day with meals 60 tablet 5    aspirin EC 81 MG EC tablet Take 1 tablet by mouth daily 30 tablet 5    nitroGLYCERIN (NITROSTAT) 0.4 MG SL tablet One q 5 min prn chest pain, for up to three doses. Seek medical attention after 3 doses.  25 tablet 0    lisinopril (PRINIVIL;ZESTRIL) 20 MG tablet Take 1 tablet by mouth daily (Patient not taking: Reported on 2020) 30 tablet 5       Allergies:  No Known Allergies    Problem List:    Patient Active Problem List   Diagnosis Code    Coronary artery disease s/p stent in 2014 by Dr. Kera Ibanez at [facility] I25.10    Mixed hyperlipidemia E78.2    Chest pain R07.9    Essential hypertension I10    Episode of syncope R55       Past Medical History:        Diagnosis Date    CAD (coronary artery disease)     Hyperlipidemia     Hypertension        Past Surgical History:        Procedure Laterality Date    ANKLE SURGERY      bilateral, pt states over 2 years ago    COLONOSCOPY     [address] WITH STENT PLACEMENT  2014    PTCA         Social History:    Social History     Tobacco Use    Smoking status: Former Smoker     Packs/day: 0.25     Years: 20.00     Pack years: 5.00     Last attempt to quit: 1995     Years since quittin.5    Smokeless tobacco: Never Used   Substance Use Topics    Alcohol use: Yes     Comment: 2x a week                                 Counseling given: Not Answered      Vital Signs (Current):   Vitals:    20 1016 20 1046 20 1101 20 1333   BP: 120/75 112/73 126/66    Pulse: 70 70 70 74   Resp: 8 11 9 16   Temp:    97.5 °F (36.4 °C)   TempSrc:    Temporal   SpO2: 98% 99% 98% 100%   Weight:       Height:                                                  BP Readings from Last 3 Encounters:   20 126/66   20 113/68   19 113/74       NPO Status: Time of last liquid consumption: 0700(water with meds)                        Time of last solid consumption: 1900                        Date of last liquid consumption: 08/03/20                        Date of last solid food consumption: 08/02/20    BMI:   Wt Readings from Last 3 Encounters:   08/03/20 165 lb (74.8 kg)   07/08/20 158 lb (71.7 kg)   12/19/19 166 lb (75.3 kg)     Body mass index is 21.18 kg/m². CBC:   Lab Results   Component Value Date    WBC 7.5 08/03/2020    RBC 3.03 08/03/2020    RBC 4.59 10/18/2011    HGB 10.1 08/03/2020    HCT 31.1 08/03/2020    .6 08/03/2020    RDW 13.0 08/03/2020     08/03/2020     10/18/2011       CMP:   Lab Results   Component Value Date     08/03/2020    K 4.9 08/03/2020     08/03/2020    CO2 22 08/03/2020    BUN 22 08/03/2020    CREATININE 0.95 08/03/2020    GFRAA >60 08/03/2020    LABGLOM >60 08/03/2020    GLUCOSE 85 08/03/2020    GLUCOSE 96 10/18/2011    PROT 7.1 07/08/2020    CALCIUM 8.9 08/03/2020    BILITOT 0.95 07/08/2020    ALKPHOS 71 07/08/2020    AST 21 07/08/2020    ALT 15 07/08/2020       POC Tests: No results for input(s): POCGLU, POCNA, POCK, POCCL, POCBUN, POCHEMO, POCHCT in the last 72 hours.     Coags:   Lab Results   Component Value Date    PROTIME 10.4 08/03/2020    INR 1.0 08/03/2020    APTT 24.2 08/03/2020       HCG (If Applicable): No results found for: PREGTESTUR, PREGSERUM, HCG, HCGQUANT     ABGs: No results found for: PHART, PO2ART, UKH3YZF, YCK7BAB, BEART, G6ZTHRDI     Type & Screen (If Applicable):  No results found for: LABABO, LABRH    Drug/Infectious Status (If Applicable):  Lab Results   Component Value Date    HEPCAB NONREACTIVE 07/12/2018       COVID-19 Screening (If Applicable):   Lab Results   Component Value Date    COVID19 Not Detected 08/03/2020         Anesthesia Evaluation  Patient summary reviewed and Nursing notes reviewed  Airway: Mallampati: II  TM distance: >3 FB   Neck ROM: full  Mouth opening: > = 3 FB Dental: normal exam         Pulmonary:Negative Pulmonary ROS breath sounds clear to auscultation Cardiovascular:  Exercise tolerance: good (>4 METS),   (+) hypertension:, CAD:, CABG/stent: no interval change,       ECG reviewed  Rhythm: regular  Rate: normal  Echocardiogram reviewed    Cleared by cardiology     Beta Blocker:  Dose within 24 Hrs         Neuro/Psych:   Negative Neuro/Psych ROS              GI/Hepatic/Renal: Neg GI/Hepatic/Renal ROS            Endo/Other: Negative Endo/Other ROS                    Abdominal:       Abdomen: soft. Vascular: negative vascular ROS. Anesthesia Plan      general     ASA 3     (LMA )  Induction: intravenous. MIPS: Postoperative opioids intended and Prophylactic antiemetics administered. Anesthetic plan and risks discussed with patient. Plan discussed with CRNA.     Attending anesthesiologist reviewed and agrees with 23[address] Blank Patel MD   8/3/2020

## 2020-08-03 NOTE — ED PROVIDER NOTES
[facility]     Emergency Department     Faculty Note/ Attestation      Pt Name: Sandeep Dubon                                       MRN: [id number]  [place] 1950  Date of evaluation: 8/3/2020  Patients PCP:    Suni Li MD    Attestation  I performed a history and physical examination of the patient/ or directly observed  and discussed management with the resident. I reviewed the residents note and agree with the documented findings and plan of care. Any areas of disagreement are noted on the chart. I was personally present for the key portions of any procedures. I have documented in the chart those procedures where I was not present during the key portions. I have reviewed the emergency nurses triage note. I agree with the chief complaint, past medical history, past surgical history, allergies, medications, social and family history as documented unless otherwise noted below. For Physician Assistant/ Nurse Practitioner cases/documentation I have personally evaluated this patient and have completed at least one if not all key elements of the E/M (history, physical exam, and MDM). Additional findings are as noted. Initial Screens:             Vitals:    Vitals:    08/03/20 0747 08/03/20 0846 08/03/20 0916   BP: (!) 142/87 133/72 134/74   Pulse: 91 74 70   Resp: 12 14 9   Temp: 98.3 °F (36.8 °C)     TempSrc: Oral     SpO2: 97% 99% 99%   Weight: 165 lb (74.8 kg)     Height: 6' 2\" (1.88 m)         Chief Complaint    No chief complaint on file. height is 6' 2\" (1.88 m) and weight is 165 lb (74.8 kg). His oral temperature is 98.3 °F (36.8 °C). His blood pressure is 134/74 and his pulse is 70. His respiration is 9 and oxygen saturation is 99%.             DIAGNOSTIC RESULTS       RADIOLOGY:   No orders to display         LABS:  Labs Reviewed - No data to display      EMERGENCY DEPARTMENT COURSE:     -------------------------      BP: 134/74, Temp: 98.3 °F (36.8 °C),

## 2020-08-03 NOTE — ED PROVIDER NOTES
Sexual Activity    Alcohol use: Yes     Comment: 2x a week     Drug use: No    Sexual activity: Not on file   Lifestyle    Physical activity     Days per week: Not on file     Minutes per session: Not on file    Stress: Not on file   Relationships    Social connections     Talks on phone: Not on file     Gets together: Not on file     Attends Buddhism service: Not on file     Active member of club or organization: Not on file     Attends meetings of clubs or organizations: Not on file     Relationship status: Not on file    Intimate partner violence     Fear of current or ex partner: Not on file     Emotionally abused: Not on file     Physically abused: Not on file     Forced sexual activity: Not on file   Other Topics Concern    Not on file   Social History Narrative    Not on file       History reviewed. No pertinent family history. Allergies:  Patient has no known allergies. Home Medications:  Prior to Admission medications    Medication Sig Start Date End Date Taking? Authorizing Provider   lisinopril (PRINIVIL;ZESTRIL) 10 MG tablet Take 1 tablet by mouth daily 7/8/20   Anais Vanegas MD   rosuvastatin (CRESTOR) 20 MG tablet Take 1 tablet by mouth nightly 6/18/20   Edgard Weathers MD   lisinopril (PRINIVIL;ZESTRIL) 20 MG tablet Take 1 tablet by mouth daily  Patient not taking: Reported on 7/13/2020 6/18/20   Edgard Weathers MD   carvedilol (COREG) 6.25 MG tablet take 1 tablet by mouth twice a day with meals 6/18/20   Edgard Weathers MD   aspirin EC 81 MG EC tablet Take 1 tablet by mouth daily 6/18/20   Edgard Weathers MD   nitroGLYCERIN (NITROSTAT) 0.4 MG SL tablet One q 5 min prn chest pain, for up to three doses. Seek medical attention after 3 doses. 12/19/19   Michelle Rollins MD       REVIEW OF SYSTEMS    (2-9 systems for level 4, 10 or more for level 5)      Review of Systems   Constitutional: Negative for chills and fever. HENT: Negative for congestion and sore throat. Respiratory: Negative for cough and shortness of breath. Cardiovascular: Negative for chest pain. Gastrointestinal: Negative for abdominal pain, nausea and vomiting. Genitourinary: Positive for penile pain and penile swelling. Negative for dysuria and frequency. PHYSICAL EXAM   (up to 7 for level 4, 8 or more for level 5)      INITIAL VITALS:   /66   Pulse 70   Temp 98.3 °F (36.8 °C) (Oral)   Resp 9   Ht 6' 2\" (1.88 m)   Wt 165 lb (74.8 kg)   SpO2 98%   BMI 21.18 kg/m²      Vitals:    08/03/20 1001 08/03/20 1016 08/03/20 1046 08/03/20 1101   BP: 108/74 120/75 112/73 126/66   Pulse: 72 70 70 70   Resp: 9 8 11 9   Temp:       TempSrc:       SpO2: 99% 98% 99% 98%   Weight:       Height:            Physical Exam  Vitals signs reviewed. Constitutional:       General: He is not in acute distress. Appearance: He is well-developed. HENT:      Head: Normocephalic and atraumatic. Eyes:      Extraocular Movements: Extraocular movements intact. Pupils: Pupils are equal, round, and reactive to light. Neck:      Musculoskeletal: Normal range of motion and neck supple. Cardiovascular:      Rate and Rhythm: Normal rate and regular rhythm. Pulmonary:      Effort: Pulmonary effort is normal.      Breath sounds: Normal breath sounds. Abdominal:      General: There is no distension. Palpations: Abdomen is soft. Tenderness: There is no abdominal tenderness. Genitourinary:     Comments: Painful erect penis, foreskin is able to be retracted. No skin pallor or discoloration. Musculoskeletal: Normal range of motion. Skin:     General: Skin is warm and dry. Neurological:      General: No focal deficit present. Mental Status: He is alert and oriented to person, place, and time.        DIFFERENTIAL  DIAGNOSIS     PLAN (LABS / IMAGING / EKG):  Orders Placed This Encounter   Procedures    COVID-19    PROTIME-INR    APTT    CBC Auto Differential    Basic Metabolic Panel w/ Reflex to MG    Misc nursing order (specify)    Inpatient consult to Urology       MEDICATIONS ORDERED:  Orders Placed This Encounter   Medications    morphine injection 4 mg    acetaminophen (TYLENOL) tablet 1,000 mg    ibuprofen (ADVIL;MOTRIN) tablet 400 mg    ondansetron (ZOFRAN-ODT) disintegrating tablet 4 mg    DISCONTD: phenylephrine (AVNI-SYNEPHRINE) injection 0.2 mg    phenylephrine (VAZCULEP) injection 0.2 mg    phenylephrine (AVNI-SYNEPHRINE) 0.5 mg in sodium chloride 0.9 % 9.95 mL IV syringe    lidocaine 1 % injection 10 mL    ceFAZolin (ANCEF) 2 g in dextrose 5 % 50 mL IVPB    morphine injection 4 mg    ketorolac (TORADOL) injection 15 mg       DIAGNOSTIC RESULTS / EMERGENCY DEPARTMENT COURSE / [facility]   LAB RESULTS:  No results found for this visit on 08/03/20. IMPRESSION: Priapism    RADIOLOGY:    EKG    All EKG's are interpreted by the Emergency Department Physician who either signs or Co-signs this chart in the absence of a cardiologist.    [facility]:    Patient come in for priapism for the past 24 hours. He is in bed uncomfortable, vital signs are within normal limits. We will treat patient symptomatically apply ice pack to the base of penis while awaiting urology recommendations. ED Course as of Aug 03 1245   Mon Aug 03, 2020   4239 Phone call with urology    [CS]   0900 Urology at bedside draining and injecting phenylephrine solution    [CS]   1013 Urology having difficulty achieving complete detumescence, will return in 30 minutes to check on patient. If detumescence is not achieved then time will take to the OR    [CS]   1050 Patient has incomplete detumescence, still painful, will redosed with morphine.   Urology is aware coming down to bedside.    [CS]   1130 Urology at bedside    [CS]   [id number] Will take pt to OR after COVID test    [CS]      ED Course User Index  [CS] Teofilo Callahan DO     Patient will likely be discharged from operating room by urology. PROCEDURES:  -priapism drainage per urology    CONSULTS:  IP CONSULT TO UROLOGY    CRITICAL CARE:  Please see attending note    FINAL IMPRESSION      1. Priapism          DISPOSITION / PLAN     DISPOSITION Decision To Admit 08/03/2020 12:33:38 PM      PATIENT REFERRED TO:  No follow-up provider specified.     DISCHARGE MEDICATIONS:  New Prescriptions    No medications on file       Latonia Carrillo DO  Emergency Medicine Resident    (Please note that portions of thisnote were completed with a voice recognition program.  Efforts were made to edit the dictations but occasionally words are mis-transcribed.)       Latonia Carrillo DO  Resident  08/03/20 2469

## 2020-08-03 NOTE — ED NOTES
Bed: 25  Expected date:   Expected time:   Means of arrival:   Comments:     Hernando Kim RN  08/03/20 [id number]

## 2020-08-03 NOTE — PROGRESS NOTES
Rapid Covid 19 swab taken from {RIGHT nare, labeled, placed in red dot bag, and handed off to second healthcare worker outside of room for transport to laboratory per hospital policy and procedure.   Patient tolerated procedure WELL

## 2020-08-03 NOTE — ED NOTES
Dr. Sherryle Callas at bedside evaluating patient.       Francis Taylor RN  08/03/20 Hakeem Garcia RN  08/03/20 [id number]

## 2020-08-04 VITALS
OXYGEN SATURATION: 100 % | RESPIRATION RATE: 16 BRPM | HEART RATE: 73 BPM | TEMPERATURE: 99 F | BODY MASS INDEX: 21.17 KG/M2 | WEIGHT: 165 LBS | DIASTOLIC BLOOD PRESSURE: 54 MMHG | HEIGHT: 74 IN | SYSTOLIC BLOOD PRESSURE: 120 MMHG

## 2020-08-04 LAB
ANION GAP SERPL CALCULATED.3IONS-SCNC: 12 MMOL/L (ref 9–17)
BUN BLDV-MCNC: 16 MG/DL (ref 8–23)
BUN/CREAT BLD: ABNORMAL (ref 9–20)
CALCIUM SERPL-MCNC: 8.4 MG/DL (ref 8.6–10.4)
CHLORIDE BLD-SCNC: 103 MMOL/L (ref 98–107)
CO2: 24 MMOL/L (ref 20–31)
CREAT SERPL-MCNC: 0.98 MG/DL (ref 0.7–1.2)
GFR AFRICAN AMERICAN: >60 ML/MIN
GFR NON-AFRICAN AMERICAN: >60 ML/MIN
GFR SERPL CREATININE-BSD FRML MDRD: ABNORMAL ML/MIN/{1.73_M2}
GFR SERPL CREATININE-BSD FRML MDRD: ABNORMAL ML/MIN/{1.73_M2}
GLUCOSE BLD-MCNC: 100 MG/DL (ref 70–99)
HCT VFR BLD CALC: 26.1 % (ref 40.7–50.3)
HEMOGLOBIN: 8.4 G/DL (ref 13–17)
MCH RBC QN AUTO: 32.6 PG (ref 25.2–33.5)
MCHC RBC AUTO-ENTMCNC: 32.2 G/DL (ref 28.4–34.8)
MCV RBC AUTO: 101.2 FL (ref 82.6–102.9)
NRBC AUTOMATED: 0 PER 100 WBC
PDW BLD-RTO: 12.9 % (ref 11.8–14.4)
PLATELET # BLD: 157 K/UL (ref 138–453)
PMV BLD AUTO: 10.3 FL (ref 8.1–13.5)
POTASSIUM SERPL-SCNC: 4 MMOL/L (ref 3.7–5.3)
RBC # BLD: 2.58 M/UL (ref 4.21–5.77)
SODIUM BLD-SCNC: 139 MMOL/L (ref 135–144)
WBC # BLD: 7.4 K/UL (ref 3.5–11.3)

## 2020-08-04 PROCEDURE — G0378 HOSPITAL OBSERVATION PER HR: HCPCS

## 2020-08-04 PROCEDURE — 6370000000 HC RX 637 (ALT 250 FOR IP): Performed by: STUDENT IN AN ORGANIZED HEALTH CARE EDUCATION/TRAINING PROGRAM

## 2020-08-04 PROCEDURE — 51798 US URINE CAPACITY MEASURE: CPT

## 2020-08-04 PROCEDURE — 2580000003 HC RX 258: Performed by: STUDENT IN AN ORGANIZED HEALTH CARE EDUCATION/TRAINING PROGRAM

## 2020-08-04 PROCEDURE — 80048 BASIC METABOLIC PNL TOTAL CA: CPT

## 2020-08-04 PROCEDURE — 36415 COLL VENOUS BLD VENIPUNCTURE: CPT

## 2020-08-04 PROCEDURE — 85027 COMPLETE CBC AUTOMATED: CPT

## 2020-08-04 PROCEDURE — 6360000002 HC RX W HCPCS: Performed by: STUDENT IN AN ORGANIZED HEALTH CARE EDUCATION/TRAINING PROGRAM

## 2020-08-04 PROCEDURE — 83020 HEMOGLOBIN ELECTROPHORESIS: CPT

## 2020-08-04 RX ORDER — CEPHALEXIN 500 MG/1
500 CAPSULE ORAL 3 TIMES DAILY
Qty: 15 CAPSULE | Refills: 0 | Status: SHIPPED | OUTPATIENT
Start: 2020-08-04 | End: 2020-08-09

## 2020-08-04 RX ORDER — HYDROCODONE BITARTRATE AND ACETAMINOPHEN 5; 325 MG/1; MG/1
1 TABLET ORAL EVERY 6 HOURS PRN
Qty: 12 TABLET | Refills: 0 | Status: SHIPPED | OUTPATIENT
Start: 2020-08-04 | End: 2020-08-07

## 2020-08-04 RX ORDER — ASPIRIN 81 MG/1
81 TABLET ORAL DAILY
Qty: 30 TABLET | Refills: 5
Start: 2020-08-04 | End: 2021-09-22 | Stop reason: SDUPTHER

## 2020-08-04 RX ORDER — POLYETHYLENE GLYCOL 3350 17 G/17G
17 POWDER, FOR SOLUTION ORAL DAILY PRN
Qty: 527 G | Refills: 1 | Status: SHIPPED | OUTPATIENT
Start: 2020-08-04 | End: 2020-09-03

## 2020-08-04 RX ADMIN — HYDROCODONE BITARTRATE AND ACETAMINOPHEN 1 TABLET: 5; 325 TABLET ORAL at 14:27

## 2020-08-04 RX ADMIN — LISINOPRIL 10 MG: 10 TABLET ORAL at 08:54

## 2020-08-04 RX ADMIN — CARVEDILOL 6.25 MG: 6.25 TABLET, FILM COATED ORAL at 11:43

## 2020-08-04 RX ADMIN — HYDROCODONE BITARTRATE AND ACETAMINOPHEN 1 TABLET: 5; 325 TABLET ORAL at 05:26

## 2020-08-04 RX ADMIN — BENZOCAINE AND MENTHOL 1 LOZENGE: 15; 3.6 LOZENGE ORAL at 05:26

## 2020-08-04 RX ADMIN — Medication 10 ML: at 11:41

## 2020-08-04 RX ADMIN — HYDROCODONE BITARTRATE AND ACETAMINOPHEN 1 TABLET: 5; 325 TABLET ORAL at 11:46

## 2020-08-04 RX ADMIN — BENZOCAINE AND MENTHOL 1 LOZENGE: 15; 3.6 LOZENGE ORAL at 08:23

## 2020-08-04 RX ADMIN — DEXTROSE MONOHYDRATE 1 G: 5 INJECTION INTRAVENOUS at 06:01

## 2020-08-04 ASSESSMENT — PAIN SCALES - GENERAL
PAINLEVEL_OUTOF10: 6
PAINLEVEL_OUTOF10: 7
PAINLEVEL_OUTOF10: 7

## 2020-08-04 NOTE — PLAN OF CARE
Problem: Infection:  Goal: Will remain free from infection  Description: Will remain free from infection  Outcome: Ongoing     Problem: Pain:  Goal: Patient's pain/discomfort is manageable  Description: Patient's pain/discomfort is manageable  Outcome: Ongoing  Goal: Pain level will decrease  Description: Pain level will decrease  Outcome: Ongoing  Goal: Control of acute pain  Description: Control of acute pain  Outcome: Ongoing  Goal: Control of chronic pain  Description: Control of chronic pain  Outcome: Ongoing

## 2020-08-04 NOTE — CARE COORDINATION
Case Management Initial Discharge Plan  Heather Weber,             Met with:patient to discuss discharge plans. Information verified: address, contacts, phone number, , insurance Yes    Emergency Contact/Next of Kin name & number: Haylie Sorto @ [phone]    PCP: Suni Li MD  Date of last visit: 3 weeks ago     Insurance Provider: Stas Willis, [address] [place] [address]     Discharge Planning    Living Arrangements:  Alone   Support Systems:  Family Members    Home has 2 stories  3 stairs to climb to get into front door, flight stairs to climb to reach second floor  Location of bedroom/bathroom in home up     Patient able to perform ADL's:Independent    Current Services (outpatient & in home) none  DME equipment: none  DME provider: na    Receiving oral anticoagulation therapy? Yes    If indicated:   Physician managing anticoagulation treatment: Dr Clark Silva   Where does patient obtain lab work for ATC treatment? [facility]       Potential Assistance Needed:  N/A    Patient agreeable to home care: No  [place] of choice provided:  n/a    Prior SNF/Rehab Placement and Facility: none  Agreeable to SNF/Rehab: No  [place] of choice provided: n/a     Evaluation: n/a    Expected Discharge date:  20    Patient expects to be discharged to:  home  Follow Up Appointment: Best Day/ Time: Monday AM    Transportation provider: maxim floydie   Transportation arrangements needed for discharge: No    Readmission Risk              Risk of Unplanned Readmission:        0             Does patient have a readmission risk score greater than 14?: No  If yes, follow-up appointment must be made within 7 days of discharge.      Goals of Care: feel better and go home      Discharge Plan: DC to home independently; has follow up PCP, Daughter to provide transport           Electronically signed by Levi Flaherty RN on 20 at 9:03 AM EDT

## 2020-08-04 NOTE — ADDENDUM NOTE
Addendum  created 08/04/20 4743 by Leni James, [address] recorded in [address] [place] [address], [place] [address] filed

## 2020-08-04 NOTE — PROGRESS NOTES
CLINICAL PHARMACY NOTE: MEDS TO [address] Select Patient?: No  Total # of Prescriptions Filled: 1   The following medications were delivered to the patient:  · miralax  Total # of Interventions Completed: 0  Time Spent (min): 0    Additional Documentation:

## 2020-08-04 NOTE — DISCHARGE SUMMARY
DISCHARGE SUMMARY NOTE:      Patient Identification  PATIENT: Dora Dykes   MRN: [id number]  :  1950  Admit Date:  8/3/2020  Discharge date:  2020                                    Disposition: home  Discharged Condition:  good  Discharge Diagnoses:   Priapism     Consults: none    Surgery: Distal penile shunt, Winter shunt. Patient Instructions: Activity: Per instructions  Diet: As tolerated  Patient told to follow up with Dr. Gallegos in 2-3 weeks  Discharge Medications:   Stef Angle   Home Medication Instructions LYO:[id number]    Printed on:20 0845   Medication Information                      aspirin EC 81 MG EC tablet  Take 1 tablet by mouth daily HOLD for 5 days after procedure. May resume 20             carvedilol (COREG) 6.25 MG tablet  take 1 tablet by mouth twice a day with meals             cephALEXin (KEFLEX) 500 MG capsule  Take 1 capsule by mouth 3 times daily for 5 days             HYDROcodone-acetaminophen (NORCO) 5-325 MG per tablet  Take 1 tablet by mouth every 6 hours as needed for Pain for up to 3 days. May take 2 tablets if needed             lisinopril (PRINIVIL;ZESTRIL) 10 MG tablet  Take 1 tablet by mouth daily             lisinopril (PRINIVIL;ZESTRIL) 20 MG tablet  Take 1 tablet by mouth daily             nitroGLYCERIN (NITROSTAT) 0.4 MG SL tablet  One q 5 min prn chest pain, for up to three doses. Seek medical attention after 3 doses. polyethylene glycol (GLYCOLAX) 17 g packet  Take 17 g by mouth daily as needed for Constipation For regular bowel movements while taking narcotics             rosuvastatin (CRESTOR) 20 MG tablet  Take 1 tablet by mouth nightly                 Hospital course: The patient did well in their hospital course. Admitted for priapism that was refractory to irrigation under local anesthesia in ED. Therefore proceeded to OR on day of admission for distal penile shunt. Tolerated procedure well.  Admitted post op for lab/vital monitoring and wound care. POD #1 patient remained flaccid with mild hematoma at base of penis from aspiration. The patient had pain controlled with PO meds, tolerated diet, and was ambulating appropriately. The patient voided prior to discharge. The patient was afebrile for 24 hrs before discharge. The patient agrees to discharge, understands discharge instructions, and agrees to follow up with Dr. Andres Juarez as outpatient.     Dc Drains: None  Wound instruction: Keep incisions clean and dry  Rx medications: Norco, MiraLAX, Keflex x 5 days       Byron Heart  8:45 AM 8/4/2020

## 2020-08-04 NOTE — PROGRESS NOTES
CLINICAL PHARMACY NOTE: MEDS  [address] Select Patient?: No  Total # of Prescriptions Filled: 4   The following medications were delivered to the patient:  · Polyethylene glycol powder (otc)  · Hydrocodone- acetaminophen 5 /325 mg tab  · Cephalexin 500 mg cap  · Aspirin 81 mg tab (otc)  Total # of Interventions Completed: 1  Time Spent (min): 60    Additional Documentation:Medications delivered to the patient.

## 2020-08-04 NOTE — PROGRESS NOTES
Delmi Rock Viviano Nash, Gabe Dileep  Urology Progress Note     Subjective:   No acute events overnight   Denies fever, chills, nausea, vomiting  penile pain well controlled     Patient Vitals for the past 24 hrs:   BP Temp Temp src Pulse Resp SpO2 Height Weight   08/03/20 2109 128/76 98.5 °F (36.9 °C) Oral 78 18 -- -- --   08/03/20 1801 124/72 98.1 °F (36.7 °C) Temporal 88 16 98 % -- --   08/03/20 1746 -- -- -- 83 -- -- -- --   08/03/20 1730 101/74 98.1 °F (36.7 °C) -- 80 16 100 % -- --   08/03/20 1715 103/68 -- -- 89 14 99 % -- --   08/03/20 1705 104/68 97.9 °F (36.6 °C) -- 89 11 100 % -- --   08/03/20 1333 -- 97.5 °F (36.4 °C) Temporal 74 16 100 % -- --   08/03/20 1101 126/66 -- -- 70 9 98 % -- --   08/03/20 1046 112/73 -- -- 70 11 99 % -- --   08/03/20 1016 120/75 -- -- 70 8 98 % -- --   08/03/20 1001 108/74 -- -- 72 9 99 % -- --   08/03/20 0931 130/73 -- -- 70 11 99 % -- --   08/03/20 0916 134/74 -- -- 70 9 99 % -- --   08/03/20 0846 133/72 -- -- 74 14 99 % -- --   08/03/20 0747 (!) 142/87 98.3 °F (36.8 °C) Oral 91 12 97 % 6' 2\" (1.88 m) 165 lb (74.8 kg)       Intake/Output Summary (Last 24 hours) at 8/4/2020 0622  Last data filed at 8/4/2020 0163  Gross per 24 hour   Intake 2736 ml   Output 1000 ml   Net 1736 ml       Recent Labs     08/03/20  1302 08/03/20  1741   WBC 7.5 5.7   HGB 10.1* 9.1*   HCT 31.1* 27.9*   .6 101.5    167     Recent Labs     08/03/20  1302      K 4.9      CO2 22   BUN 22   CREATININE 0.95       No results for input(s): COLORU, PHUR, LABCAST, WBCUA, RBCUA, MUCUS, TRICHOMONAS, YEAST, BACTERIA, CLARITYU, SPECGRAV, LEUKOCYTESUR, UROBILINOGEN, BILIRUBINUR, BLOODU in the last 72 hours. Invalid input(s): NITRATE, GLUCOSEUKETONESUAMORPHOUS    Additional Lab/culture results:    Physical Exam:   Constitutional: Patient in no acute distress. Neuro: alert and oriented to person place and time. Head: Atraumatic and normocephalic.   Neck: Trachea midline   Ext: 2+ radial pulses bilaterally. Psych: Mood and affect normal.  Skin: No rashes or bruising present. Lungs: Respiratory effort normal.  Cardiovascular:  Regular rhythm. Abdomen: Soft, non-tender, non-distended. Neither side has CVA tenderness on exam.  Bladder non-tender and not distended. Lymphatics: no palpable lymphadenopathy.   Penis -flaccid  Urethral meatus normal  Scrotal exam normal    Interval Imaging Findings:    Impression:    POD 1 from distal penile shunt   Priapism       Plan:   Gen diet   Iv fluids at 75ml/ hr   Norco prn for pain control   Compression dressing removed - no bleeding and flaccid penis   Ancef for 24 hrs   Check for Hb electrophoresis   Dc planning         Mae Fees  6:22 AM 8/4/2020

## 2020-08-05 LAB
HGB ELECTROPHORESIS INTERP: NORMAL
PATHOLOGIST: NORMAL

## 2020-08-06 ENCOUNTER — TELEPHONE (OUTPATIENT)
Dept: UROLOGY | Age: 70
End: 2020-08-06

## 2020-08-06 NOTE — TELEPHONE ENCOUNTER
----- Message from Damir Ko MD sent at 8/4/2020  8:37 AM EDT -----  Regarding: Post op follow up  Hi Jodie,    This patient had distal shunt in OR yesterday by Dr. Carlos Oneill for priapism. He is being discharged from the hospital today. Could we please arrange for follow up in 2-3 weeks for recheck? Next time Dr. Carlos Oneill is in the office should be ok. Thanks!   Abdirizak Sample

## 2020-08-06 NOTE — TELEPHONE ENCOUNTER
Pt has humana gold which our office does not take. Spoke with dr Chato Patel, he can be seen in October for a NP post op. Tried to call private office to schedule his appointment with holt there and they were at lunch. We need to call their office and schedule him an appt there and then call the patient back to give him the information. Pt is aware that our [facility] office does not accept his insurance.

## 2020-08-24 ENCOUNTER — TELEPHONE (OUTPATIENT)
Dept: GASTROENTEROLOGY | Age: 70
End: 2020-08-24

## 2020-08-24 NOTE — TELEPHONE ENCOUNTER
1st attempt-spoke with a woman and she told me that Kenyon Laurita was not home at this time. Woman would not take down information to call back, so I informed her that I would send out a letter with our information. She verbalized she understood.

## 2020-10-05 ENCOUNTER — TELEPHONE (OUTPATIENT)
Dept: INTERNAL MEDICINE | Age: 70
End: 2020-10-05

## 2021-01-06 DIAGNOSIS — E78.2 MIXED HYPERLIPIDEMIA: ICD-10-CM

## 2021-01-06 DIAGNOSIS — I25.10 CORONARY ARTERY DISEASE INVOLVING NATIVE CORONARY ARTERY OF NATIVE HEART WITHOUT ANGINA PECTORIS: ICD-10-CM

## 2021-01-06 DIAGNOSIS — I10 ESSENTIAL HYPERTENSION: ICD-10-CM

## 2021-01-06 RX ORDER — CARVEDILOL 6.25 MG/1
TABLET ORAL
Qty: 60 TABLET | Refills: 5 | Status: SHIPPED | OUTPATIENT
Start: 2021-01-06 | End: 2021-07-19

## 2021-01-06 RX ORDER — ROSUVASTATIN CALCIUM 20 MG/1
20 TABLET, COATED ORAL NIGHTLY
Qty: 30 TABLET | Refills: 5 | Status: SHIPPED | OUTPATIENT
Start: 2021-01-06 | End: 2021-07-19

## 2021-01-06 RX ORDER — LISINOPRIL 20 MG/1
TABLET ORAL
Qty: 30 TABLET | Refills: 5 | Status: SHIPPED | OUTPATIENT
Start: 2021-01-06 | End: 2021-09-22 | Stop reason: SINTOL

## 2021-01-06 NOTE — TELEPHONE ENCOUNTER
E-scribing request for carvedilol  pt has no future appt. Last seen 7/13/20. Please advise. Health Maintenance   Topic Date Due    DTaP/Tdap/Td vaccine (1 - Tdap) 06/27/1967    Shingles Vaccine (1 of 2) 06/27/1998    Pneumococcal 65+ years Vaccine (1 of 1 - PPSV23) 06/27/2013    Colon cancer screen colonoscopy  04/07/2019    Annual Wellness Visit (AWV)  06/19/2019    Lipid screen  07/11/2020    Flu vaccine (1) 09/01/2020    Potassium monitoring  08/04/2021    Creatinine monitoring  08/04/2021    AAA screen  Completed    Hepatitis C screen  Completed    Hepatitis A vaccine  Aged Out    Hepatitis B vaccine  Aged Out    Hib vaccine  Aged Out    Meningococcal (ACWY) vaccine  Aged Out             (applicable per patient's age: Cancer Screenings, Depression Screening, Fall Risk Screening, Immunizations)    LDL Cholesterol (mg/dL)   Date Value   07/11/2019 84     AST (U/L)   Date Value   07/08/2020 21     ALT (U/L)   Date Value   07/08/2020 15     BUN (mg/dL)   Date Value   08/04/2020 16      (goal A1C is < 7)   (goal LDL is <100) need 30-50% reduction from baseline     BP Readings from Last 3 Encounters:   08/04/20 (!) 120/54   08/03/20 (!) 74/56   07/08/20 113/68    (goal /80)      All Future Testing planned in [facility]:  Lab Frequency Next Occurrence   Basic Metabolic Panel Once 35/40/8843       Next Visit Date:  No future appointments.          Patient Active Problem List:     Coronary artery disease s/p stent in 2014 by Dr. Renee Gross at [facility]     Mixed hyperlipidemia     Chest pain     Essential hypertension     Episode of syncope     Priapism

## 2021-07-18 DIAGNOSIS — E78.2 MIXED HYPERLIPIDEMIA: ICD-10-CM

## 2021-07-18 DIAGNOSIS — I25.10 CORONARY ARTERY DISEASE INVOLVING NATIVE CORONARY ARTERY OF NATIVE HEART WITHOUT ANGINA PECTORIS: ICD-10-CM

## 2021-07-18 DIAGNOSIS — I10 ESSENTIAL HYPERTENSION: ICD-10-CM

## 2021-07-19 RX ORDER — ROSUVASTATIN CALCIUM 20 MG/1
20 TABLET, COATED ORAL NIGHTLY
Qty: 30 TABLET | Refills: 5 | Status: SHIPPED | OUTPATIENT
Start: 2021-07-19 | End: 2021-09-22 | Stop reason: SDUPTHER

## 2021-07-19 RX ORDER — CARVEDILOL 6.25 MG/1
TABLET ORAL
Qty: 60 TABLET | Refills: 5 | Status: SHIPPED | OUTPATIENT
Start: 2021-07-19 | End: 2021-12-02 | Stop reason: SDUPTHER

## 2021-07-19 NOTE — TELEPHONE ENCOUNTER
E-scribing request for CRESTOR. Pt has future appt.        Health Maintenance   Topic Date Due    COVID-19 Vaccine (1) Never done    DTaP/Tdap/Td vaccine (1 - Tdap) Never done    Shingles Vaccine (1 of 2) Never done    Pneumococcal 65+ years Vaccine (1 of 1 - PPSV23) Never done    Colon cancer screen colonoscopy  04/07/2019    Annual Wellness Visit (AWV)  Never done    Lipid screen  07/11/2020    Potassium monitoring  08/04/2021    Creatinine monitoring  08/04/2021    Flu vaccine (1) 09/01/2021    AAA screen  Completed    Hepatitis C screen  Completed    Hepatitis A vaccine  Aged Out    Hepatitis B vaccine  Aged Out    Hib vaccine  Aged Out    Meningococcal (ACWY) vaccine  Aged Out             (applicable per patient's age: Cancer Screenings, Depression Screening, Fall Risk Screening, Immunizations)    LDL Cholesterol (mg/dL)   Date Value   07/11/2019 84     AST (U/L)   Date Value   07/08/2020 21     ALT (U/L)   Date Value   07/08/2020 15     BUN (mg/dL)   Date Value   08/04/2020 16      (goal A1C is < 7)   (goal LDL is <100) need 30-50% reduction from baseline     BP Readings from Last 3 Encounters:   08/04/20 (!) 120/54   08/03/20 (!) 74/56   07/08/20 113/68    (goal /80)      All Future Testing planned in [facility]:  Lab Frequency Next Occurrence       Next Visit Date:  Future Appointments   Date Time Provider Hieu Mock   9/22/2021  1:00 PM Ernestina Phelps MD [address] [place] [address]            Patient Active Problem List:     Coronary artery disease s/p stent in 2014 by Dr. Cassandra Carson at [facility]     Mixed hyperlipidemia     Chest pain     Essential hypertension     Episode of syncope     Priapism

## 2021-09-22 ENCOUNTER — OFFICE VISIT (OUTPATIENT)
Dept: INTERNAL MEDICINE | Age: 71
End: 2021-09-22
Payer: MEDICARE

## 2021-09-22 VITALS
DIASTOLIC BLOOD PRESSURE: 96 MMHG | WEIGHT: 169 LBS | HEART RATE: 85 BPM | HEIGHT: 69 IN | SYSTOLIC BLOOD PRESSURE: 159 MMHG | BODY MASS INDEX: 25.03 KG/M2

## 2021-09-22 DIAGNOSIS — I25.10 CORONARY ARTERY DISEASE INVOLVING NATIVE CORONARY ARTERY OF NATIVE HEART WITHOUT ANGINA PECTORIS: Primary | ICD-10-CM

## 2021-09-22 DIAGNOSIS — Z23 NEEDS FLU SHOT: ICD-10-CM

## 2021-09-22 DIAGNOSIS — I10 ESSENTIAL HYPERTENSION: ICD-10-CM

## 2021-09-22 DIAGNOSIS — F10.10 AA (ALCOHOL ABUSE): ICD-10-CM

## 2021-09-22 DIAGNOSIS — Z23 NEED FOR PROPHYLACTIC VACCINATION AGAINST DIPHTHERIA-TETANUS-PERTUSSIS (DTP): ICD-10-CM

## 2021-09-22 DIAGNOSIS — E78.2 MIXED HYPERLIPIDEMIA: ICD-10-CM

## 2021-09-22 DIAGNOSIS — Z12.11 COLON CANCER SCREENING: ICD-10-CM

## 2021-09-22 PROCEDURE — 99211 OFF/OP EST MAY X REQ PHY/QHP: CPT | Performed by: INTERNAL MEDICINE

## 2021-09-22 PROCEDURE — 90688 IIV4 VACCINE SPLT 0.5 ML IM: CPT | Performed by: STUDENT IN AN ORGANIZED HEALTH CARE EDUCATION/TRAINING PROGRAM

## 2021-09-22 PROCEDURE — 99213 OFFICE O/P EST LOW 20 MIN: CPT | Performed by: STUDENT IN AN ORGANIZED HEALTH CARE EDUCATION/TRAINING PROGRAM

## 2021-09-22 RX ORDER — FOLIC ACID 1 MG/1
1 TABLET ORAL DAILY
Qty: 90 TABLET | Refills: 1 | Status: SHIPPED | OUTPATIENT
Start: 2021-09-22 | End: 2022-08-23 | Stop reason: SDUPTHER

## 2021-09-22 RX ORDER — ASPIRIN 81 MG/1
81 TABLET ORAL DAILY
Qty: 30 TABLET | Refills: 5 | Status: SHIPPED | OUTPATIENT
Start: 2021-09-22 | End: 2022-03-22

## 2021-09-22 RX ORDER — ROSUVASTATIN CALCIUM 20 MG/1
20 TABLET, COATED ORAL NIGHTLY
Qty: 30 TABLET | Refills: 5 | Status: SHIPPED | OUTPATIENT
Start: 2021-09-22 | End: 2021-12-02 | Stop reason: SDUPTHER

## 2021-09-22 RX ORDER — LISINOPRIL 10 MG/1
10 TABLET ORAL DAILY
Qty: 30 TABLET | Refills: 5 | Status: SHIPPED | OUTPATIENT
Start: 2021-09-22 | End: 2022-03-21

## 2021-09-22 RX ORDER — THIAMINE MONONITRATE (VIT B1) 100 MG
100 TABLET ORAL DAILY
Qty: 30 TABLET | Refills: 3 | Status: SHIPPED | OUTPATIENT
Start: 2021-09-22 | End: 2022-08-23 | Stop reason: SDUPTHER

## 2021-09-22 RX ORDER — UBIDECARENONE 75 MG
100 CAPSULE ORAL DAILY
Qty: 30 TABLET | Refills: 3 | Status: SHIPPED | OUTPATIENT
Start: 2021-09-22 | End: 2022-08-23 | Stop reason: SDUPTHER

## 2021-09-22 SDOH — ECONOMIC STABILITY: FOOD INSECURITY: WITHIN THE PAST 12 MONTHS, YOU WORRIED THAT YOUR FOOD WOULD RUN OUT BEFORE YOU GOT MONEY TO BUY MORE.: NEVER TRUE

## 2021-09-22 SDOH — ECONOMIC STABILITY: FOOD INSECURITY: WITHIN THE PAST 12 MONTHS, THE FOOD YOU BOUGHT JUST DIDN'T LAST AND YOU DIDN'T HAVE MONEY TO GET MORE.: NEVER TRUE

## 2021-09-22 ASSESSMENT — ENCOUNTER SYMPTOMS
BLOOD IN STOOL: 0
SHORTNESS OF BREATH: 0
WHEEZING: 0
COUGH: 0
CONSTIPATION: 0
BACK PAIN: 0
DIARRHEA: 0
ABDOMINAL PAIN: 0
CHEST TIGHTNESS: 0
NAUSEA: 0
VOMITING: 0

## 2021-09-22 ASSESSMENT — PATIENT HEALTH QUESTIONNAIRE - PHQ9
SUM OF ALL RESPONSES TO PHQ9 QUESTIONS 1 & 2: 0
SUM OF ALL RESPONSES TO PHQ QUESTIONS 1-9: 0
SUM OF ALL RESPONSES TO PHQ QUESTIONS 1-9: 0
2. FEELING DOWN, DEPRESSED OR HOPELESS: 0
SUM OF ALL RESPONSES TO PHQ QUESTIONS 1-9: 0
1. LITTLE INTEREST OR PLEASURE IN DOING THINGS: 0

## 2021-09-22 ASSESSMENT — SOCIAL DETERMINANTS OF HEALTH (SDOH): HOW HARD IS IT FOR YOU TO PAY FOR THE VERY BASICS LIKE FOOD, HOUSING, MEDICAL CARE, AND HEATING?: NOT HARD AT ALL

## 2021-09-22 NOTE — PATIENT INSTRUCTIONS
Medications e-scribed to pharmacy of patient's choice. Labs given to patient, they will have them done before their next visit. Order for cologard with directions given to pt       Follow-up appointment scheduled for 10/26/21, AVS given to patient.     awv scheduled for 10/07/21 at 10 pm    tv

## 2021-09-22 NOTE — PROGRESS NOTES
Attending Physician Statement  I have discussed the care of [address] including pertinent history and exam findings,  with the resident. I have reviewed the key elements of all parts of the encounter with the resident. I agree with the assessment, plan and orders as documented by the resident.   (GE Modifier)    MD SUSIE Lang  Attending Physician, [address] [place] [address], Internal Medicine Residency Program  [address] [place] [address]  9/22/2021, 1:50 PM
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
## 2021-09-28 NOTE — PROGRESS NOTES
Impression: Combined forms of age-related cataract, left eye: H25.812 Left. Condition: worsening. Symptoms: could improve with surgery. Vision: vision affected. Plan: Cataracts account for the patient's complaints. Discussed diagnosis in detail with patient. Discussed all R/B's and procedures. Patient understands changing glasses prescription will not significantly improve vision. Patient elects to have surgery, phacoemulsification with intraocular lens recommended. Discussed lens options of Toric/Multifocal, or Standard lens, RL2. Will refer to cataract surgeon for pre-operative evaluation OS ONLY.  Pneumococcal 65+ years Vaccine (1 of 1 - PPSV23) 06/27/2013    Colon cancer screen colonoscopy  04/07/2019    Annual Wellness Visit (AWV)  06/19/2019    Potassium monitoring  12/27/2019    Creatinine monitoring  12/27/2019    Lipid screen  07/11/2020    Flu vaccine (1) 09/01/2020    AAA screen  Completed    Hepatitis C screen  Completed    Hepatitis A vaccine  Aged Out    Hepatitis B vaccine  Aged Out    Hib vaccine  Aged Out    Meningococcal (ACWY) vaccine  Aged Out       Subjective:      Review of Systems   Neurological: Positive for dizziness. All other systems reviewed and are negative. Objective:     Physical Exam  Constitutional:       Appearance: Normal appearance. HENT:      Head: Normocephalic. Cardiovascular:      Rate and Rhythm: Normal rate and regular rhythm. Pulmonary:      Effort: Pulmonary effort is normal.      Breath sounds: Normal breath sounds. Abdominal:      General: Abdomen is flat. Musculoskeletal:         General: No swelling. Skin:     General: Skin is warm and dry. Neurological:      General: No focal deficit present. Mental Status: He is alert and oriented to person, place, and time. Psychiatric:         Mood and Affect: Mood normal.         Behavior: Behavior normal.       /68   Pulse 73   Temp 97.7 °F (36.5 °C)   Wt 158 lb (71.7 kg)   SpO2 99%   BMI 20.29 kg/m²       Assessment:       Diagnosis Orders   1. Dizziness  Comprehensive Metabolic Panel    CBC Auto Differential   2. Syncope, unspecified syncope type  Comprehensive Metabolic Panel    CBC Auto Differential   3. Alcohol consumption heavy  Comprehensive Metabolic Panel    CBC Auto Differential       Plan:      No follow-ups on file.     Orders Placed This Encounter   Medications    lisinopril (PRINIVIL;ZESTRIL) 10 MG tablet     Sig: Take 1 tablet by mouth daily     Dispense:  30 tablet     Refill:  5     Orders Placed This Encounter   Procedures    Comprehensive Metabolic Panel     Standing Status:   Future     Standing Expiration Date:   7/8/2021    CBC Auto Differential     Standing Status:   Future     Standing Expiration Date:   7/8/2021     Told to abstain from alcohol. Patient given educational materials - see patient instructions. Discussed use, benefit, and side effects of prescribed medications. All patientquestions answered. Pt voiced understanding.     Electronically signed by Judy Valencia MD on 7/8/2020at 2:31 PM

## 2021-10-07 ENCOUNTER — VIRTUAL VISIT (OUTPATIENT)
Dept: INTERNAL MEDICINE | Age: 71
End: 2021-10-07
Payer: MEDICARE

## 2021-10-07 DIAGNOSIS — Z00.00 ROUTINE GENERAL MEDICAL EXAMINATION AT A HEALTH CARE FACILITY: ICD-10-CM

## 2021-10-07 DIAGNOSIS — Z71.89 ACP (ADVANCE CARE PLANNING): ICD-10-CM

## 2021-10-07 PROCEDURE — G0438 PPPS, INITIAL VISIT: HCPCS | Performed by: NURSE PRACTITIONER

## 2021-10-07 ASSESSMENT — LIFESTYLE VARIABLES
HOW OFTEN DURING THE LAST YEAR HAVE YOU FOUND THAT YOU WERE NOT ABLE TO STOP DRINKING ONCE YOU HAD STARTED: 0
HOW OFTEN DO YOU HAVE A DRINK CONTAINING ALCOHOL: 3
AUDIT-C TOTAL SCORE: 3
AUDIT TOTAL SCORE: 3
HAVE YOU OR SOMEONE ELSE BEEN INJURED AS A RESULT OF YOUR DRINKING: 0
HOW OFTEN DURING THE LAST YEAR HAVE YOU NEEDED AN ALCOHOLIC DRINK FIRST THING IN THE MORNING TO GET YOURSELF GOING AFTER A NIGHT OF HEAVY DRINKING: 0
HOW OFTEN DURING THE LAST YEAR HAVE YOU HAD A FEELING OF GUILT OR REMORSE AFTER DRINKING: 0
HOW OFTEN DO YOU HAVE SIX OR MORE DRINKS ON ONE OCCASION: 0
HOW OFTEN DURING THE LAST YEAR HAVE YOU BEEN UNABLE TO REMEMBER WHAT HAPPENED THE NIGHT BEFORE BECAUSE YOU HAD BEEN DRINKING: 0
HOW OFTEN DURING THE LAST YEAR HAVE YOU FAILED TO DO WHAT WAS NORMALLY EXPECTED FROM YOU BECAUSE OF DRINKING: 0
HOW MANY STANDARD DRINKS CONTAINING ALCOHOL DO YOU HAVE ON A TYPICAL DAY: 0
HAS A RELATIVE, FRIEND, DOCTOR, OR ANOTHER HEALTH PROFESSIONAL EXPRESSED CONCERN ABOUT YOUR DRINKING OR SUGGESTED YOU CUT DOWN: 0

## 2021-10-07 ASSESSMENT — PATIENT HEALTH QUESTIONNAIRE - PHQ9
SUM OF ALL RESPONSES TO PHQ QUESTIONS 1-9: 0
2. FEELING DOWN, DEPRESSED OR HOPELESS: 0
1. LITTLE INTEREST OR PLEASURE IN DOING THINGS: 0
SUM OF ALL RESPONSES TO PHQ9 QUESTIONS 1 & 2: 0
SUM OF ALL RESPONSES TO PHQ QUESTIONS 1-9: 0
SUM OF ALL RESPONSES TO PHQ QUESTIONS 1-9: 0

## 2021-10-07 NOTE — PATIENT INSTRUCTIONS
Learning About Medical Power of   What is a medical power of ? A medical power of , also called a durable power of  for health care, is one type of the legal forms called advance directives. It lets you name the person you want to make treatment decisions for you if you can't speak or decide for yourself. The person you choose is called your health care agent. This person is also called a health care proxy or health care surrogate. A medical power of  may be called something else in your state. How do you choose a health care agent? Choose your health care agent carefully. This person may or may not be a family member. Talk to the person before you make your final decision. Make sure he or she is comfortable with this responsibility. It's a good idea to choose someone who:  · Is at least 25years old. · Knows you well and understands what makes life meaningful for you. · Understands your Yarsanism and moral values. · Will do what you want, not what he or she wants. · Will be able to make difficult choices at a stressful time. · Will be able to refuse or stop treatment, if that is what you would want, even if you could die. · Will be firm and confident with health professionals if needed. · Will ask questions to get needed information. · Lives near you or agrees to travel to you if needed. Your family may help you make medical decisions while you can still be part of that process. But it's important to choose one person to be your health care agent in case you aren't able to make decisions for yourself. If you don't fill out the legal form and name a health care agent, the decisions your family can make may be limited. A health care agent may be called something else in your state. Who will make decisions for you if you don't have a health care agent? If you don't have a health care agent or a living will, you may not get the care you want. Decisions may be made by family members who disagree about your medical care. Or decisions may be made by a medical professional who doesn't know you well. In some cases, a  makes the decisions. When you name a health care agent, it is very clear who has the power to make health decisions for you. How do you name a health care agent? You name your health care agent on a legal form. This form is usually called a medical power of . Ask your hospital, state bar association, or office on aging where to find these forms. You must sign the form to make it legal. Some states require you to get the form notarized. This means that a person called a  watches you sign the form and then he or she signs the form. Some states also require that two or more witnesses sign the form. Be sure to tell your family members and doctors who your health care agent is. Where can you learn more? Go to https://QuadWranglepepiceweb.PlayBuzz. org and sign in to your GeoEye account. Enter [id number] in the [employer] box to learn more about \"Learning About [address]. \"     If you do not have an account, please click on the \"Sign Up Now\" link. Current as of: March 17, 2021               Content Version: 13.0  © 1705-7657 Healthwise, Incorporated. Care instructions adapted under license by [facility] ([facility]). If you have questions about a medical condition or this instruction, always ask your healthcare professional. [place] [address] any warranty or liability for your use of this information. Learning About Living Greg Valdez  What is a living will? A living will, also called a declaration, is a legal form. It tells your family and your doctor your wishes when you can't speak for yourself. It's used by the health professionals who will treat you as you near the end of your life or if you get seriously hurt or ill.   If you put your wishes in writing, your loved ones and others will know what kind of care you want. They won't need to guess. This can ease your mind and be helpful to others. And you can change or cancel your living will at any time. A living will is not the same as an estate or property will. An estate will explains what you want to happen with your money and property after you die. How do you use it? A living will is used to describe the kinds of treatment or life support you want as you near the end of your life or if you get seriously hurt or ill. Keep these facts in mind about living clemons. · Your living will is used only if you can't speak or make decisions for yourself. Most often, one or more doctors must certify that you can't speak or decide for yourself before your living will takes effect. · If you get better and can speak for yourself again, you can accept or refuse any treatment. It doesn't matter what you said in your living will. · Some states may limit your right to refuse treatment in certain cases. For example, you may need to clearly state in your living will that you don't want artificial hydration and nutrition, such as being fed through a tube. Is a living will a legal document? A living will is a legal document. Each state has its own laws about living clemons. And a living will may be called something else in your state. Here are some things to know about living clemons. · You don't need an  to complete a living will. But legal advice can be helpful if your state's laws are unclear. It can also help if your health history is complicated or your family can't agree on what should be in your living will. · You can change your living will at any time. Some people find that their wishes about end-of-life care change as their health changes. If you make big changes to your living will, complete a new form. · If you move to another state, make sure that your living will is legal in the state where you now live.  In most cases, doctors will respect your wishes even if you have a form from a different state. · You might use a universal form that has been approved by many states. This kind of form can sometimes be filled out and stored online. Your digital copy will then be available wherever you have a connection to the internet. The doctors and nurses who need to treat you can find it right away. · Your state may offer an online registry. This is another place where you can store your living will online. · It's a good idea to get your living will notarized. This means using a person called a [employer] to watch two people sign, or witness, your living will. What should you know when you create a living will? Here are some questions to ask yourself as you make your living will:  · Do you know enough about life support methods that might be used? If not, talk to your doctor so you know what might be done if you can't breathe on your own, your heart stops, or you can't swallow. · What things would you still want to be able to do after you receive life-support methods? Would you want to be able to walk? To speak? To eat on your own? To live without the help of machines? · Do you want certain Temple practices performed if you become very ill? · If you have a choice, where do you want to be cared for? In your home? At a hospital or nursing home? · If you have a choice at the end of your life, where would you prefer to die? At home? In a hospital or nursing home? Somewhere else? · Would you prefer to be buried or cremated? · Do you want your organs to be donated after you die? What should you do with your living will? · Make sure that your family members and your health care agent have copies of your living will (also called a declaration). · Give your doctor a copy of your living will. Ask him or her to keep it as part of your medical record. If you have more than one doctor, make sure that each one has a copy.   · Put a copy of your living will where it can be easily found. For example, some people may put a copy on their refrigerator door. If you are using a digital copy, be sure your doctor, family members, and health care agent know how to find and access it. Where can you learn more? Go to https://chpepiceweb.Arisdyne [email or web address] org and sign in to your [employer]oche account. Enter T202 in the [facility] box to learn more about \"Learning About Living [place]. \"     If you do not have an account, please click on the \"Sign Up Now\" link. Current as of: March 17, 2021               Content Version: 13.0  © 5207-1769 Healthwise, [email or web address]. Care instructions adapted under license by [facility] ([facility]). If you have questions about a medical condition or this instruction, always ask your healthcare professional. [address] any warranty or liability for your use of this information. Personalized Preventive Plan for Angelito Geller - 10/7/2021  Medicare offers a range of preventive health benefits. Some of the tests and screenings are paid in full while other may be subject to a deductible, co-insurance, and/or copay. Some of these benefits include a comprehensive review of your medical history including lifestyle, illnesses that may run in your family, and various assessments and screenings as appropriate. After reviewing your medical record and screening and assessments performed today your provider may have ordered immunizations, labs, imaging, and/or referrals for you. A list of these orders (if applicable) as well as your Preventive Care list are included within your After Visit Summary for your review. Other Preventive Recommendations:    · A preventive eye exam performed by an eye specialist is recommended every 1-2 years to screen for glaucoma; cataracts, macular degeneration, and other eye disorders. · A preventive dental visit is recommended every 6 months.   · Try to get at least 150 minutes of exercise per week or 10,000 steps per day on a pedometer . · Order or download the FREE \"Exercise & Physical Activity: Your Everyday Guide\" from [employer]. Call 0-426.617.9201 or search [employer] online. · You need 2721-4295 mg of calcium and 9170-1896 IU of vitamin D per day. It is possible to meet your calcium requirement with diet alone, but a vitamin D supplement is usually necessary to meet this goal.  · When exposed to the sun, use a sunscreen that protects against both UVA and UVB radiation with an SPF of 30 or greater. Reapply every 2 to 3 hours or after sweating, drying off with a towel, or swimming. · Always wear a seat belt when traveling in a car. Always wear a helmet when riding a bicycle or motorcycle. Alcohol Abuse and Alcoholism   (Alcohol Dependence; Alcohol Use Disorder)       Definition   Alcohol abuse is excessive or problematic alcohol consumption. It can progress to alcoholism. Alcoholism is chronic alcohol abuse that results in a physical dependence on alcohol (withdrawal symptoms) and an inability to stop or limit drinking. Causes   Several factors can contribute to alcohol abuse and alcoholism, including:   Genes   Brain chemicals that may be different than normal   Social pressure   Emotional stress   Pain   Depression and other mental health problems   Problem drinking behaviors learned from family or friends   Risk Factors   These factors increase your chance of developing alcoholism. Tell your doctor if you have any of these risk factors:   Sex: male   Family members who abuse alcohol (especially men whose fathers or brothers are alcoholic)   Starting to use alcohol at an early age (younger than 15)   Using illicit drugs or non-medical use of prescription drugs   Peer pressure   Easy access to alcoholic beverages   Psychiatric disorders, such as depression or anxiety   Smoking   Symptoms   It is common to deny an alcohol problem. Alcohol abuse can occur without physical dependence. Alcohol abuse symptoms include:   Repeated work, school, or home problems due to drinking   Risking physical safety   Recurring trouble with the law, often including drinking and driving   Continuing to drink despite alcohol-related difficulties   Symptoms of alcoholism include:   Craving a drink   Unable to stop or limit drinking   Needing greater amounts of alcohol to feel the same effect   Giving up activities in order to drink or recover from alcohol   Drinking that continues even when it causes or worsens health problems   Wanting to stop or reduce drinking, but not being able   Withdrawal symptoms if alcohol is stopped include:   Nausea   Sweating   Shaking   Anxiety   Increased blood pressure   Seizures ( delirium tremens [DTs])   The brain, nervous system, heart, liver, stomach, gastrointestinal tract, and pancreas can all be damaged by alcoholism. Some of the Organs Damaged in Alcohol Abuse        [address] [place] [address].   Diagnosis   Doctors ask a series of questions to assess possible alcohol-related problems, including:   Have you tried to reduce your drinking? Have you felt bad about drinking? Have you been annoyed by another person's criticism of your drinking? Do you drink in the morning to steady your nerves or cure a hangover? Do you have problems with a job, your family, or the law? Do you drive under the influence of alcohol? Blood tests may be done to:   Look at the size of your red blood cells and to check for a substance called carbohydrate-deficient transferrin   Check for alcohol-related liver disease and other health problems   Treatment   Treatment for alcohol abuse or dependence is aimed at teaching patients how to manage the disease. Most professionals believe that this means giving up alcohol completely and permanently. The first and most important step is recognizing a problem exists.  Successful treatment depends on your desire to change. Your doctor can help you withdraw from alcohol safely. This could require hospitalization in a detoxification center. They will carefully monitor you for side effects. You may need medication while you are undergoing detoxification. Treatments include:   Medications    Drugs can help relieve some of the symptoms of withdrawal and help prevent relapse. The doctor may prescribe medication to reduce cravings for alcohol. Medications used to treat alcoholism and to try to prevent drinking include:   Naltrexone (ReVia, Vivitrol)blocks the high that makes you crave alcohol   Disulfiram (Antabuse)makes you very sick if you drink alcohol   Acamprosate (Campral)reduces your craving for alcohol   A study showed that an anticonvulsant drug, topiramate (Topamax), may reduce alcohol dependence. Education and Counseling    Therapy helps you to recognize alcohol's dangers. Education raises awareness of underlying issues and lifestyles that promote drinking. In therapy, you work to improve coping skills and learn other ways of dealing with stress or pain. Mentoring and Community Help    Alcoholics Anonymous (AA) helps many people to stop drinking and stay sober. Members meet regularly and support each other. Your family members may also benefit from attending meetings of Keo. Living with an alcoholic can be a painful, stressful situation. Here are some general statistics on treatment outcomes of individuals one year after attempting to stop drinkin/3 remained abstinent   1/3 resumed drinking but at a lower level   1/3 relapsed completely   If you are diagnosed with alcohol abuse or alcoholism, follow your doctor's instructions . Prevention   Realizing that alcohol causes problems helps some people avoid it. Suggestions to decrease the risk of alcohol abuse and dependence include:   Socialize without alcohol. Avoid going to bars. Do not keep alcohol in your home.    Avoid situations and people that encourage drinking. Make new nondrinking friends. Do fun things that do not involve alcohol. Avoid reaching for a drink when stressed or upset. Limit your alcohol intake to a moderate level. Moderate is two or fewer drinks per day for men and one or fewer for women and older adults   A 12-ounce bottle of beer, a five-ounce glass of wine, or 1.5 ounces of liquor is considered one drink   If you are a parent, having a good relationship with your children may reduce their risk of alcohol abuse. Most professionals who treat alcohol abuse and dependence believe that complete abstinence is the only effective prevention.      Last Reviewed: September 2010 Elizabeth Tobin MD, PhD, MPH   Updated: 9/20/2010   ·

## 2021-10-07 NOTE — PROGRESS NOTES
Lab orders printed, info on getting labs done also send. AVS printed and will be mailed to pt. Information for GI doctor pt was referred to will be provided to patient. Patient will be notified that he may need a new referral, please contact this office if he does.

## 2021-10-07 NOTE — PROGRESS NOTES
Medicare Annual Wellness Visit  Are Name: Tara Aviles Date: 10/7/2021   MRN: [id number] Sex: Male   Age: 66 y.o. Ethnicity: Non- / Non    : 1950 Race: Black /       Bebeto Renae is here for [facility] (vaccine and GI consult pending  and labs ordered today )    Screenings for behavioral, psychosocial and functional/safety risks, and cognitive dysfunction are all negative except as indicated below. These results, as well as other patient data from the [address] form, are documented in Flowsheets linked to this Encounter. No Known Allergies      Prior to Visit Medications    Medication Sig Taking? Authorizing Provider   vitamin B-1 (THIAMINE) 100 MG tablet Take 1 tablet by mouth daily Yes Collette Hahn, MD   folic acid (FOLVITE) 1 MG tablet Take 1 tablet by mouth daily Yes Korina Walden MD   vitamin B-12 (CYANOCOBALAMIN) 100 MCG tablet Take 1 tablet by mouth daily Yes Collette Hahn, MD   lisinopril (PRINIVIL;ZESTRIL) 10 MG tablet Take 1 tablet by mouth daily Yes Collette Hahn, MD   rosuvastatin (CRESTOR) 20 MG tablet Take 1 tablet by mouth nightly Yes Collette Hahn, MD   aspirin EC 81 MG EC tablet Take 1 tablet by mouth daily HOLD for 5 days after procedure. May resume 20 Yes Collette Hahn, MD   carvedilol (COREG) 6.25 MG tablet take 1 tablet by mouth twice a day with meals Yes Esther Pearl MD   nitroGLYCERIN (NITROSTAT) 0.4 MG SL tablet One q 5 min prn chest pain, for up to three doses. Seek medical attention after 3 doses.  Yes Maryana Bennett MD         Past Medical History:   Diagnosis Date    CAD (coronary artery disease)     Hyperlipidemia     Hypertension        Past Surgical History:   Procedure Laterality Date    ANKLE SURGERY      bilateral, pt states over 2 years ago    COLONOSCOPY      CORONARY ANGIOPLASTY WITH STENT PLACEMENT      PENILE PROSTHESIS PLACEMENT N/A 8/3/2020    PENILE DISTAL SHUNT INSERTION performed by Yoko Weaver MD at [email or web address]  08/03/2020    PENILE DISTAL SHUNT INSERTION     PTCA  2014       No family history on file. CareTeam (Including outside providers/suppliers regularly involved in providing care):   Patient Care Team:  Navid Loomis MD as PCP - General (Internal Medicine)    Wt Readings from Last 3 Encounters:   09/22/21 169 lb (76.7 kg)   08/03/20 165 lb (74.8 kg)   07/08/20 158 lb (71.7 kg)      Patient-Reported Vitals 10/7/2021   Patient-Reported Weight 169   Patient-Reported Height 6'2\"   Patient-Reported Systolic -   Patient-Reported Diastolic -      There is no height or weight on file to calculate BMI. Based upon direct observation of the patient, evaluation of cognition reveals recent and remote memory intact. Wt Readings from Last 3 Encounters:   09/22/21 169 lb (76.7 kg)   08/03/20 165 lb (74.8 kg)   07/08/20 158 lb (71.7 kg)     Temp Readings from Last 3 Encounters:   08/04/20 99 °F (37.2 °C)   08/03/20 94.6 °F (34.8 °C)   07/08/20 97.7 °F (36.5 °C)     BP Readings from Last 3 Encounters:   09/22/21 (!) 159/96   08/04/20 (!) 120/54   08/03/20 (!) 74/56     Pulse Readings from Last 3 Encounters:   09/22/21 85   08/04/20 73   07/08/20 73       Patient's complete Health Risk Assessment and screening values have been reviewed and are found in Flowsheets. The following problems were reviewed today and where indicated follow up appointments were made and/or referrals ordered. Positive Risk Factor Screenings with Interventions:            General Health and ACP:  General  In general, how would you say your health is?: Good  In the past 7 days, have you experienced any of the following?  New or Increased Pain, New or Increased Fatigue, Loneliness, Social Isolation, Stress or Anger?: None of These  Do you get the social and emotional support that you need?: Yes  Do you have a Living Will?: (!) No  Advance Directives     Power of  Living Will ACP-Advance Directive ACP-Power of     Not on File Not on File Not on File Not on File      General Health Risk Interventions:  · No Living Will: Advance Care Planning addressed with patient today and info sent with AVS    Health Habits/Nutrition:  Health Habits/Nutrition  Do you exercise for at least 20 minutes 2-3 times per week?: (!) No  Have you lost any weight without trying in the past 3 months?: No  Do you eat only one meal per day?: No  Have you seen the dentist within the past year?: (!) No     Health Habits/Nutrition Interventions:  · Inadequate physical activity:  patient agrees to exercise for at least 150 minutes/week  · Dental exam overdue:  patient encouraged to make appointment with his/her dentist       Personalized Preventive Plan   Current Health Maintenance Status  Immunization History   Administered Date(s) Administered    COVID-19, Swanson Peter, PF, 30mcg/0.3mL 02/25/2021, 03/18/2021    Influenza Virus Vaccine 12/08/2017, 10/17/2019    Influenza, Quadv, 6 mo and older, IM (Fluzone, Flulaval) 12/08/2017    Influenza, Quadv, IM, (6 mo and older Fluzone, Flulaval, Fluarix and 3 yrs and older Afluria) 10/17/2019, 09/22/2021        Health Maintenance   Topic Date Due    Shingles Vaccine (1 of 2) Never done    Pneumococcal 65+ years Vaccine (1 of 1 - PPSV23) Never done    Colon cancer screen colonoscopy  04/07/2019    Annual Wellness Visit (AWV)  Never done    Lipid screen  07/11/2020    Potassium monitoring  08/04/2021    Creatinine monitoring  08/04/2021    DTaP/Tdap/Td vaccine (1 - Tdap) 10/07/2022 (Originally 6/27/1969)    Flu vaccine  Completed    COVID-19 Vaccine  Completed    AAA screen  Completed    Hepatitis C screen  Completed    Hepatitis A vaccine  Aged Out    Hepatitis B vaccine  Aged Out    Hib vaccine  Aged Out    Meningococcal (ACWY) vaccine  Aged Out     Recommendations for [employer] Due: see orders and patient instructions/AVS.  .   Recommended screening schedule for the next 5-10 years is provided to the patient in written form: see Patient Cathi Vanegas was seen today for medicare awv. Diagnoses and all orders for this visit:    ACP (advance care planning)    Routine general medical examination at a health care facility  -     Potassium; Future  -     Creatinine, Serum; Future  -     OR DEPRESSION SCREEN ANNUAL               Lucho Quinones is a 66 y.o. male being evaluated by a Virtual Visit (phone) encounter to address concerns as mentioned above. A caregiver was present when appropriate. Due to this being a TeleHealth encounter (During [facility]-76 public health emergency), evaluation of the following organ systems was limited: Vitals/Constitutional/EENT/Resp/CV/GI//MS/Neuro/Skin/Heme-Lymph-Imm. Pursuant to the emergency declaration under the [address] [place] [address], [address] [place] [address] authority and the [employer] and [employer] Act, this Virtual Visit was conducted with patient's (and/or legal guardian's) consent, to reduce the patient's risk of exposure to COVID-19 and provide necessary medical care. The patient (and/or legal guardian) has also been advised to contact this office for worsening conditions or problems, and seek emergency medical treatment and/or call 911 if deemed necessary. Patient identification was verified at the start of the visit: Yes    Services were provided through phone to substitute for in-person clinic visit. Patient and provider were located at their individual homes. --JORDEN Murdock - CNP on 10/7/2021 at 1:13 PM    An electronic signature was used to authenticate this note.

## 2021-10-26 ENCOUNTER — HOSPITAL ENCOUNTER (OUTPATIENT)
Age: 71
Setting detail: SPECIMEN
Discharge: HOME | End: 2021-10-26
Payer: MEDICARE

## 2021-10-26 DIAGNOSIS — I25.10 CORONARY ARTERY DISEASE INVOLVING NATIVE CORONARY ARTERY OF NATIVE HEART WITHOUT ANGINA PECTORIS: ICD-10-CM

## 2021-10-26 DIAGNOSIS — E78.2 MIXED HYPERLIPIDEMIA: ICD-10-CM

## 2021-10-26 LAB
CHOLESTEROL, FASTING: 139 MG/DL
CHOLESTEROL/HDL RATIO: 1.8
HDLC SERPL-MCNC: 76 MG/DL
LDL CHOLESTEROL: 52 MG/DL (ref 0–130)
TRIGLYCERIDE, FASTING: 53 MG/DL
VLDLC SERPL CALC-MCNC: NORMAL MG/DL (ref 1–30)

## 2021-11-01 ENCOUNTER — OFFICE VISIT (OUTPATIENT)
Dept: UROLOGY | Age: 71
End: 2021-11-01
Payer: MEDICARE

## 2021-11-01 VITALS — DIASTOLIC BLOOD PRESSURE: 95 MMHG | BODY MASS INDEX: 24.96 KG/M2 | WEIGHT: 169 LBS | SYSTOLIC BLOOD PRESSURE: 164 MMHG

## 2021-11-01 DIAGNOSIS — N52.9 VASCULOGENIC ERECTILE DYSFUNCTION, UNSPECIFIED VASCULOGENIC ERECTILE DYSFUNCTION TYPE: Primary | ICD-10-CM

## 2021-11-01 DIAGNOSIS — I51.9 HEART DISEASE: ICD-10-CM

## 2021-11-01 PROCEDURE — 99214 OFFICE O/P EST MOD 30 MIN: CPT | Performed by: UROLOGY

## 2021-11-01 NOTE — PROGRESS NOTES
Fadia Conway, Tia Dow, & Delmi Cody, Keke Fermin. Urology History and Physical    Patient:  Katty Gavin  MRN: [id number]  YOB: 1950    CHIEF COMPLAINT:  ED    HISTORY OF PRESENT ILLNESS:   The patient is a 66 y.o. male  With hx of CAD s/p stent 2014 on brilinta and aspirin, HLD on statin and HTN on coreg and not currently reported to be taking lisinopril or nitroglycerin. Pt since episode of priapism and takedown described below in 2018 has had partial erections and is unhappy with quality. He is able to ejaculate normal volume and achieve penetration however would like to try medical therapy. Patient's old records, notes and chart reviewed and summarized below:  Pt last seen 9/23/2018 with priapism and denied using erectile dysfunction medications such as Viagra other phosphodiesterase inhibitors, cocaine, penile injections. The patient had never seen a urologist before. Pt underwent irrig with saline and phenylephrine which was successful. Past Medical History:    Past Medical History:   Diagnosis Date    CAD (coronary artery disease)     Hyperlipidemia     Hypertension        Past Surgical History:    Past Surgical History:   Procedure Laterality Date    ANKLE SURGERY      bilateral, pt states over 2 years ago    COLONOSCOPY      CORONARY ANGIOPLASTY WITH STENT PLACEMENT  2014    PENILE PROSTHESIS PLACEMENT N/A 8/3/2020    PENILE DISTAL SHUNT INSERTION performed by Jean Mayer MD at [address]  08/03/2020    PENILE DISTAL SHUNT INSERTION     PTCA  2014       Medications:    Scheduled Meds:  Continuous Infusions:  PRN Meds:. Allergies:  Patient has no known allergies.     Social History:    Social History     Socioeconomic History    Marital status: Single     Spouse name: Not on file    Number of children: Not on file    Years of education: Not on file    Highest education level: Not on file   Occupational History    Not on file   Tobacco Respiratory effort normal  Cardiovascular:  Normal peripheral pulses  Abdomen: Soft, non-tender, non-distended with no CVA, flank pain, hepatosplenomegaly or hernia. Kidneys normal.  Bladder non-tender and not distended. Lymphatics: no palpable lymphadenopathy        -----------------------------------------------------------------  Imaging Results:  Imaging was independently reviewed and confirmed with report. Nonew    Assessment and Plan   66year old M with PMHx of CAD s/p stents in 2014 on brilinta, aspirin, HTN on coreg and encouraged to take lisinopril, HLD on statin and AA abuse requiring several hospitalizations here for complaints of ED after an episode of priapism in 2018 requiring takedown.     -Although pt denies taking nitroglycerin, recommend follow up with cardiologist for optimization of HTN and f/u s/p stents before prescribing viagra or cialis for symptom improvement. -RTC in 3 months after follow up with cardiologist to discuss treatment options    Eddie Pedro MD PGY2  Urology     Attending Physician Statement  I have discussed the care of Mealni Wilburn including pertinent history and exam findings with the resident. I have seen and examined the patient and the key elements of all parts of the encounter have been performed by me. I agree with the assessment, plan, and orders as documented by the resident. (GC Modifier). I agree  It appears as though he has not seen his cardiologist recently. Would recommend consultation with them prior to prescribing erectile dysfunction medications. This is worsening chronic problem and medications were discussed.     Dr. Omar Elam MD

## 2021-11-09 ENCOUNTER — OFFICE VISIT (OUTPATIENT)
Dept: INTERNAL MEDICINE | Age: 71
End: 2021-11-09
Payer: MEDICARE

## 2021-11-09 VITALS
HEIGHT: 74 IN | DIASTOLIC BLOOD PRESSURE: 77 MMHG | SYSTOLIC BLOOD PRESSURE: 127 MMHG | HEART RATE: 84 BPM | BODY MASS INDEX: 21.17 KG/M2 | WEIGHT: 165 LBS

## 2021-11-09 DIAGNOSIS — I25.10 CORONARY ARTERY DISEASE INVOLVING NATIVE CORONARY ARTERY OF NATIVE HEART WITHOUT ANGINA PECTORIS: ICD-10-CM

## 2021-11-09 DIAGNOSIS — I10 ESSENTIAL HYPERTENSION: Primary | ICD-10-CM

## 2021-11-09 DIAGNOSIS — Z86.010 HX OF COLONIC POLYP: ICD-10-CM

## 2021-11-09 DIAGNOSIS — E78.2 MIXED HYPERLIPIDEMIA: ICD-10-CM

## 2021-11-09 DIAGNOSIS — N48.30 PRIAPISM: ICD-10-CM

## 2021-11-09 PROCEDURE — 99211 OFF/OP EST MAY X REQ PHY/QHP: CPT | Performed by: INTERNAL MEDICINE

## 2021-11-09 PROCEDURE — 99213 OFFICE O/P EST LOW 20 MIN: CPT | Performed by: STUDENT IN AN ORGANIZED HEALTH CARE EDUCATION/TRAINING PROGRAM

## 2021-11-09 ASSESSMENT — ENCOUNTER SYMPTOMS
DIARRHEA: 0
BACK PAIN: 0
SHORTNESS OF BREATH: 0
COUGH: 0
BLOOD IN STOOL: 0
CHEST TIGHTNESS: 0
ABDOMINAL PAIN: 0
CONSTIPATION: 0
WHEEZING: 0
NAUSEA: 0
VOMITING: 0

## 2021-11-09 NOTE — PROGRESS NOTES
[facility]  [facility] [address]  [address] [place] [address]  Dept: [phone]  Dept Fax: [phone]    Office Progress/Follow Up Note  Date ofpatient's visit: 11/9/2021  Patient's Name:  Mariaa Tamez YOB: 1950            Patient Care Team:  Lashanda Juarez MD as PCP - General (Internal Medicine)  ================================================================    REASON FOR VISIT/CHIEF COMPLAINT:  1 Month Follow-Up, Hypertension, and Health Maintenance (shingles and pneumomia vaccines declined )    HISTORY OF PRESENTING ILLNESS:  History was obtained from: patient, electronic medical record. Kesha Moody a 66 y.o. is here for a follow up visit for HTN. Hypertension: Well-controlled. Blood pressure during this visit 127/77. Patient states that he has been taking all his medications including lisinopril 10mg daily and Coreg 6.25 mg twice daily. Patient denies any chest pains, headaches, blurring of vision, dizziness, shortness of breath. Hyperlipidemia: Patient currently taking Crestor 20 mg nightly. Lipid panel reviewed. History of CAD status post stents in 2014: Currently taking aspirin, Lipitor. Denies any complaints of chest pain.     Priapism: Follows outpatient with urology      Patient Active Problem List   Diagnosis    Coronary artery disease s/p stent in 2014 by Dr. Yanira Thapa at [facility]    Mixed hyperlipidemia    Chest pain    Essential hypertension    Episode of syncope    Priapism       Health Maintenance Due   Topic Date Due    Shingles Vaccine (1 of 2) Never done    Pneumococcal 65+ years Vaccine (1 of 1 - PPSV23) Never done    Colon cancer screen colonoscopy  04/07/2019    Potassium monitoring  08/04/2021    Creatinine monitoring  08/04/2021    COVID-19 Vaccine (3 - Booster for Pfizer series) 09/18/2021       No Known Allergies      Current Outpatient Medications   Medication Sig Dispense Refill    vitamin B-1 (THIAMINE) 100 MG tablet Take 1 tablet by mouth daily 30 tablet 3    folic acid (FOLVITE) 1 MG tablet Take 1 tablet by mouth daily 90 tablet 1    vitamin B-12 (CYANOCOBALAMIN) 100 MCG tablet Take 1 tablet by mouth daily 30 tablet 3    lisinopril (PRINIVIL;ZESTRIL) 10 MG tablet Take 1 tablet by mouth daily 30 tablet 5    rosuvastatin (CRESTOR) 20 MG tablet Take 1 tablet by mouth nightly 30 tablet 5    aspirin EC 81 MG EC tablet Take 1 tablet by mouth daily HOLD for 5 days after procedure. May resume 20 30 tablet 5    carvedilol (COREG) 6.25 MG tablet take 1 tablet by mouth twice a day with meals 60 tablet 5    nitroGLYCERIN (NITROSTAT) 0.4 MG SL tablet One q 5 min prn chest pain, for up to three doses. Seek medical attention after 3 doses. 25 tablet 0     No current facility-administered medications for this visit. Social History     Tobacco Use    Smoking status: Former Smoker     Packs/day: 0.25     Years: 20.00     Pack years: 5.00     Quit date: 1995     Years since quittin.8    Smokeless tobacco: Never Used   Substance Use Topics    Alcohol use: Yes     Comment: 2x a week     Drug use: No       No family history on file. REVIEW OF SYSTEMS:  Review of Systems   Constitutional: Negative for activity change, appetite change and fever. HENT: Negative for congestion. Respiratory: Negative for cough, chest tightness, shortness of breath and wheezing. Cardiovascular: Negative for chest pain, palpitations and leg swelling. Gastrointestinal: Negative for abdominal pain, blood in stool, constipation, diarrhea, nausea and vomiting. Genitourinary: Negative for difficulty urinating. Musculoskeletal: Negative for arthralgias and back pain. Neurological: Negative for dizziness, light-headedness and headaches. Psychiatric/Behavioral: Negative for behavioral problems, confusion and decreased concentration.        PHYSICAL EXAM:  Vitals:    21 1358   BP: 127/77   Site: Right Upper Arm   Position: Sitting   Cuff Size: Medium Adult   Pulse: 84   Weight: 165 lb (74.8 kg)   Height: 6' 2\" (1.88 m)     BP Readings from Last 3 Encounters:   11/09/21 127/77   11/01/21 (!) 164/95   09/22/21 (!) 159/96        Physical Exam  Constitutional:       General: He is not in acute distress. Appearance: Normal appearance. He is normal weight. He is not ill-appearing. HENT:      Head: Normocephalic and atraumatic. Eyes:      Extraocular Movements: Extraocular movements intact. Pupils: Pupils are equal, round, and reactive to light. Cardiovascular:      Rate and Rhythm: Normal rate and regular rhythm. Pulses: Normal pulses. Heart sounds: Normal heart sounds. No murmur heard. Pulmonary:      Effort: Pulmonary effort is normal. No respiratory distress. Breath sounds: Normal breath sounds. No wheezing. Chest:      Chest wall: No tenderness. Abdominal:      General: Abdomen is flat. Bowel sounds are normal. There is no distension. Palpations: Abdomen is soft. Tenderness: There is no abdominal tenderness. There is no guarding. Musculoskeletal:      Right lower leg: No edema. Left lower leg: No edema. Neurological:      Mental Status: He is alert and oriented to person, place, and time.    Psychiatric:         Behavior: Behavior normal.           DIAGNOSTIC FINDINGS:  CBC:  Lab Results   Component Value Date    WBC 7.4 08/04/2020    HGB 8.4 08/04/2020     08/04/2020     10/18/2011       BMP:    Lab Results   Component Value Date     08/04/2020    K 4.0 08/04/2020     08/04/2020    CO2 24 08/04/2020    BUN 16 08/04/2020    CREATININE 0.98 08/04/2020    GLUCOSE 100 08/04/2020    GLUCOSE 96 10/18/2011       HEMOGLOBIN A1C: No results found for: LABA1C    FASTING LIPID PANEL:  Lab Results   Component Value Date    CHOL 129 11/20/2017    HDL 76 10/26/2021    TRIG 59 11/20/2017       ASSESSMENT AND PLAN:  Amna Gomez was seen today for

## 2021-11-09 NOTE — PATIENT INSTRUCTIONS
Referral to [facility] gastroenterology [phone]    [facility] [phone]      tv

## 2021-11-16 ENCOUNTER — TELEPHONE (OUTPATIENT)
Dept: GASTROENTEROLOGY | Age: 71
End: 2021-11-16

## 2021-12-02 DIAGNOSIS — I25.10 CORONARY ARTERY DISEASE INVOLVING NATIVE CORONARY ARTERY OF NATIVE HEART WITHOUT ANGINA PECTORIS: ICD-10-CM

## 2021-12-02 DIAGNOSIS — I10 ESSENTIAL HYPERTENSION: ICD-10-CM

## 2021-12-02 DIAGNOSIS — E78.2 MIXED HYPERLIPIDEMIA: ICD-10-CM

## 2021-12-02 RX ORDER — CARVEDILOL 6.25 MG/1
TABLET ORAL
Qty: 60 TABLET | Refills: 5 | Status: SHIPPED | OUTPATIENT
Start: 2021-12-02 | End: 2022-06-13

## 2021-12-02 RX ORDER — ROSUVASTATIN CALCIUM 20 MG/1
20 TABLET, COATED ORAL NIGHTLY
Qty: 30 TABLET | Refills: 5 | Status: SHIPPED | OUTPATIENT
Start: 2021-12-02 | End: 2022-03-15 | Stop reason: SDUPTHER

## 2021-12-02 NOTE — TELEPHONE ENCOUNTER
Request for medication refill, carvedilol, rosuvastatin.       Next Visit Matt Mcdaniels seen 9/22/21  Future Appointments   Date Time Provider Hieu Mock   2/7/2022 10:20 AM Mary Walter MD [facility] Urology [address] Maintenance   Topic Date Due    Shingles Vaccine (1 of 2) Never done    Pneumococcal 65+ years Vaccine (1 of 1 - PPSV23) Never done    Colon cancer screen colonoscopy  04/07/2019    Potassium monitoring  08/04/2021    Creatinine monitoring  08/04/2021    COVID-19 Vaccine (3 - Booster for Pfizer series) 09/18/2021    DTaP/Tdap/Td vaccine (1 - Tdap) 10/07/2022 (Originally 6/27/1967)    Annual Wellness Visit (AWV)  10/08/2022    Lipid screen  10/26/2022    Flu vaccine  Completed    AAA screen  Completed    Hepatitis C screen  Completed    Hepatitis A vaccine  Aged Out    Hepatitis B vaccine  Aged Out    Hib vaccine  Aged Out    Meningococcal (ACWY) vaccine  Aged Out       No results found for: LABA1C          ( goal A1C is < 7)   No results found for: LABMICR  LDL Cholesterol (mg/dL)   Date Value   10/26/2021 52       (goal LDL is <100)   AST (U/L)   Date Value   07/08/2020 21     ALT (U/L)   Date Value   07/08/2020 15     BUN (mg/dL)   Date Value   08/04/2020 16     BP Readings from Last 3 Encounters:   11/09/21 127/77   11/01/21 (!) 164/95   09/22/21 (!) 159/96          (goal 120/80)    All Future Testing planned in [facility]  Lab Frequency Next Occurrence   Cologuard (For External Results Only) Once 09/22/2022   Potassium Once 01/15/2022   Creatinine, Serum Once 01/15/2022         Patient Active Problem List:     Coronary artery disease s/p stent in 2014 by Dr. Wilkins Goldberg at [facility]     Mixed hyperlipidemia     Chest pain     Essential hypertension     Episode of syncope     Priapism

## 2022-01-13 ENCOUNTER — TELEPHONE (OUTPATIENT)
Dept: INTERNAL MEDICINE | Age: 72
End: 2022-01-13

## 2022-01-13 NOTE — LETTER
[address] [address]  Phone: [phone]  Fax: [phone]    Tj Gutierrez MD        April 4, 2022    [address] [place] [address]      Dear Clover Corbin: We were unable to reach you by phone. It is important that you contact us by calling [phone], at your earliest convenience. This message is regarding your Cologuard Test.  If you did not receive test your Cologuard Test in the mail Please call [phone] to get a new one. If you have any questions or concerns, please don't hesitate to call.     Sincerely,        Tj Gutierrez MD

## 2022-01-13 NOTE — TELEPHONE ENCOUNTER
Fax received that pt has not returned cologuard test. PC to pt to see if pt received test and if pt had any questions or concerns about the test. PC to pt; LM on  for pt to call office back. If pt needs a new test Please call [phone] to get a new one sent to them.

## 2022-01-24 ENCOUNTER — TELEPHONE (OUTPATIENT)
Dept: INTERNAL MEDICINE | Age: 72
End: 2022-01-24

## 2022-01-24 NOTE — LETTER
[address] [address]  Phone: [phone]  Fax: [phone]    Michael Aponte MD        January 24, 2022    [address] [place] [address]      Dear Abiodun Valdovinos: This letter is a reminder that you may have diagnostic testing that has not been completed. It is important to your well-being that these test(s) are performed. Please find the outstanding test(s) attached. If you could please have these completed before your next appointment. You can have the test completed at any [facility] facility or Lab. Please see the order for scheduling instructions. Any testing that needs completed other than blood work or xray's please call [phone] to schedule an appointment. Otherwise can be done at any [facility]. Please call our office at Dept: [phone] for additional information on the outstanding tests or let us know if they have been completed so we may update your chart. If you have any questions or concerns, please don't hesitate to call.     Sincerely,        Michael Aponte MD

## 2022-02-07 ENCOUNTER — OFFICE VISIT (OUTPATIENT)
Dept: UROLOGY | Age: 72
End: 2022-02-07
Payer: MEDICARE

## 2022-02-07 VITALS
WEIGHT: 165 LBS | SYSTOLIC BLOOD PRESSURE: 138 MMHG | BODY MASS INDEX: 21.17 KG/M2 | HEART RATE: 87 BPM | HEIGHT: 74 IN | DIASTOLIC BLOOD PRESSURE: 77 MMHG

## 2022-02-07 DIAGNOSIS — N40.1 BENIGN PROSTATIC HYPERPLASIA WITH URINARY OBSTRUCTION: ICD-10-CM

## 2022-02-07 DIAGNOSIS — N52.9 VASCULOGENIC ERECTILE DYSFUNCTION, UNSPECIFIED VASCULOGENIC ERECTILE DYSFUNCTION TYPE: Primary | ICD-10-CM

## 2022-02-07 DIAGNOSIS — N13.8 BENIGN PROSTATIC HYPERPLASIA WITH URINARY OBSTRUCTION: ICD-10-CM

## 2022-02-07 DIAGNOSIS — I51.9 HEART DISEASE: ICD-10-CM

## 2022-02-07 PROCEDURE — 99214 OFFICE O/P EST MOD 30 MIN: CPT | Performed by: UROLOGY

## 2022-02-07 NOTE — PROGRESS NOTES
Urology Clinic Note    Patient:  Rohini Muñoz  MRN: [id number]  YOB: 1950    CHIEF COMPLAINT:  ED    HISTORY OF PRESENT ILLNESS:   The patient is a 66 y.o. male  With hx of CAD s/p stent 2014 on brilinta and aspirin, HLD on statin and HTN on coreg and not currently reported to be taking lisinopril or nitroglycerin. Pt since episode of priapism and takedown described below in 2018 has had partial erections and is unhappy with quality. He is able to ejaculate normal volume and achieve penetration however would like to try medical therapy. Saw back Feb 2022 and is intermittently taking Nitro for CP. Discussed that viagra is not safe when doing this. He understands. Patient's old records, notes and chart reviewed and summarized below:  Pt last seen 9/23/2018 with priapism and denied using erectile dysfunction medications such as Viagra other phosphodiesterase inhibitors, cocaine, penile injections. The patient had never seen a urologist before. Pt underwent irrig with saline and phenylephrine which was successful. Past Medical History:    Past Medical History:   Diagnosis Date    CAD (coronary artery disease)     Hyperlipidemia     Hypertension        Past Surgical History:    Past Surgical History:   Procedure Laterality Date    ANKLE SURGERY      bilateral, pt states over 2 years ago    COLONOSCOPY      CORONARY ANGIOPLASTY WITH STENT PLACEMENT  2014    PENILE PROSTHESIS PLACEMENT N/A 8/3/2020    PENILE DISTAL SHUNT INSERTION performed by Johnny Handley MD at [address]  08/03/2020    PENILE DISTAL SHUNT INSERTION     PTCA  2014       Medications:    Scheduled Meds:  Continuous Infusions:  PRN Meds:. Allergies:  Patient has no known allergies.     Social History:    Social History     Socioeconomic History    Marital status: Single     Spouse name: Not on file    Number of children: Not on file    Years of education: Not on file    Highest education level: Not on file   Occupational History    Not on file   Tobacco Use    Smoking status: Former Smoker     Packs/day: 0.25     Years: 20.00     Pack years: 5.00     Quit date: 1995     Years since quittin.0    Smokeless tobacco: Never Used   Substance and Sexual Activity    Alcohol use: Yes     Comment: 2x a week     Drug use: No    Sexual activity: Not on file   Other Topics Concern    Not on file   Social History Narrative    Not on file     Social Determinants of Health     Financial Resource Strain: Low Risk     Difficulty of Paying Living Expenses: Not hard at all   Food Insecurity: No Food Insecurity    Worried About [address] [facility] in the Last Year: Never true    [facility] in the Last Year: Never true   Transportation Needs:     Lack of Transportation (Medical): Not on file    Lack of Transportation (Non-Medical): Not on file   Physical Activity:     Days of Exercise per Week: Not on file    Minutes of Exercise per Session: Not on file   Stress:     Feeling of Stress : Not on file   Social Connections:     Frequency of Communication with Friends and Family: Not on file    Frequency of Social Gatherings with Friends and Family: Not on file    Attends Judaism Services: Not on file    Active Member of [address] [place] [address] or Organizations: Not on file    Attends Club or Organization Meetings: Not on file    Marital Status: Not on file   Intimate Partner Violence:     Fear of Current or Ex-Partner: Not on file    Emotionally Abused: Not on file    Physically Abused: Not on file    Sexually Abused: Not on file   Housing Stability:     Unable to Pay for Housing in the Last Year: Not on file    Number of [place] in the Last Year: Not on file    Unstable Housing in the Last Year: Not on file       Family History:  No family history on file.       REVIEW OF SYSTEMS:  Constitutional: negative  Eyes: negative  Respiratory: negative  Cardiovascular: negative  Gastrointestinal: negative  Genitourinary: see HPI  Musculoskeletal: negative  Skin: negative   Neurological: burning at LE and UE tips  Hematological/Lymphatic: negative  Psychological: negative    Physical Exam:    This a 66 y.o. male   No data found. Constitutional: Patient in no acute distress; Neuro: alert and oriented to person place and time. Burning at tips of hands and feet   Psych: Mood and affect normal.  Skin: Normal  Lungs: Respiratory effort normal  Cardiovascular:  Normal peripheral pulses  Abdomen: Soft, non-tender, non-distended with no CVA, flank pain, hepatosplenomegaly or hernia. Kidneys normal.  Bladder non-tender and not distended. Lymphatics: no palpable lymphadenopathy        -----------------------------------------------------------------  Imaging Results:  Imaging was independently reviewed and confirmed with report. Nonew    Assessment and Plan   66year old M with PMHx of CAD s/p stents in 2014 on brilinta, aspirin, HTN on coreg and encouraged to take lisinopril, HLD on statin and AA abuse requiring several hospitalizations here for complaints of ED after an episode of priapism in 2018 requiring takedown. Intermittent Nitro Use. Will hold off on oral agents. BPH mild. See back PRN. See back in 1 year with PSA.      Dr. Lanette Tapia MD

## 2022-03-14 DIAGNOSIS — E78.2 MIXED HYPERLIPIDEMIA: ICD-10-CM

## 2022-03-15 RX ORDER — ROSUVASTATIN CALCIUM 20 MG/1
20 TABLET, COATED ORAL NIGHTLY
Qty: 60 TABLET | Refills: 5 | Status: SHIPPED | OUTPATIENT
Start: 2022-03-15

## 2022-03-19 DIAGNOSIS — I10 ESSENTIAL HYPERTENSION: ICD-10-CM

## 2022-03-19 DIAGNOSIS — I25.10 CORONARY ARTERY DISEASE INVOLVING NATIVE CORONARY ARTERY OF NATIVE HEART WITHOUT ANGINA PECTORIS: ICD-10-CM

## 2022-03-21 RX ORDER — LISINOPRIL 10 MG/1
TABLET ORAL
Qty: 30 TABLET | Refills: 5 | Status: SHIPPED | OUTPATIENT
Start: 2022-03-21 | End: 2022-07-20 | Stop reason: SDUPTHER

## 2022-03-21 NOTE — TELEPHONE ENCOUNTER
Request for Lisinopril. Next Visit Date:  No future appointments.     Health Maintenance   Topic Date Due    Shingles Vaccine (1 of 2) Never done    Pneumococcal 65+ years Vaccine (1 of 1 - PPSV23) Never done    Colorectal Cancer Screen  04/07/2019    Potassium monitoring  08/04/2021    Creatinine monitoring  08/04/2021    COVID-19 Vaccine (3 - Booster for Pfizer series) 08/18/2021    DTaP/Tdap/Td vaccine (1 - Tdap) 10/07/2022 (Originally 6/27/1967)    Depression Screen  10/07/2022    Annual Wellness Visit (AWV)  10/08/2022    Lipid screen  10/26/2022    Flu vaccine  Completed    AAA screen  Completed    Hepatitis C screen  Completed    Hepatitis A vaccine  Aged Out    Hepatitis B vaccine  Aged Out    Hib vaccine  Aged Out    Meningococcal (ACWY) vaccine  Aged Out       No results found for: LABA1C          ( goal A1C is < 7)   No results found for: LABMICR  LDL Cholesterol (mg/dL)   Date Value   10/26/2021 52       (goal LDL is <100)   AST (U/L)   Date Value   07/08/2020 21     ALT (U/L)   Date Value   07/08/2020 15     BUN (mg/dL)   Date Value   08/04/2020 16     BP Readings from Last 3 Encounters:   02/07/22 138/77   11/09/21 127/77   11/01/21 (!) 164/95          (goal 120/80)    All Future Testing planned in [facility]  Lab Frequency Next Occurrence   Cologuard (For External Results Only) Once 09/22/2022   Potassium Once 04/24/2022   Creatinine, Serum Once 04/24/2022   PSA, Diagnostic Once 02/07/2023         Patient Active Problem List:     Coronary artery disease s/p stent in 2014 by Dr. Adalberto Urena at [facility]     Mixed hyperlipidemia     Chest pain     Essential hypertension     Episode of syncope     Priapism

## 2022-03-22 DIAGNOSIS — I25.10 CORONARY ARTERY DISEASE INVOLVING NATIVE CORONARY ARTERY OF NATIVE HEART WITHOUT ANGINA PECTORIS: ICD-10-CM

## 2022-03-22 RX ORDER — ASPIRIN 81 MG/1
TABLET, COATED ORAL
Qty: 60 TABLET | Refills: 5 | Status: SHIPPED | OUTPATIENT
Start: 2022-03-22 | End: 2022-08-23 | Stop reason: SDUPTHER

## 2022-03-22 NOTE — TELEPHONE ENCOUNTER
Request for Aspirin. Next Visit Date:  No future appointments.     Health Maintenance   Topic Date Due    Shingles Vaccine (1 of 2) Never done    Pneumococcal 65+ years Vaccine (1 of 1 - PPSV23) Never done    Colorectal Cancer Screen  04/07/2019    Potassium monitoring  08/04/2021    Creatinine monitoring  08/04/2021    COVID-19 Vaccine (3 - Booster for Pfizer series) 08/18/2021    DTaP/Tdap/Td vaccine (1 - Tdap) 10/07/2022 (Originally 6/27/1967)    Depression Screen  10/07/2022    Annual Wellness Visit (AWV)  10/08/2022    Lipid screen  10/26/2022    Flu vaccine  Completed    AAA screen  Completed    Hepatitis C screen  Completed    Hepatitis A vaccine  Aged Out    Hepatitis B vaccine  Aged Out    Hib vaccine  Aged Out    Meningococcal (ACWY) vaccine  Aged Out       No results found for: LABA1C          ( goal A1C is < 7)   No results found for: LABMICR  LDL Cholesterol (mg/dL)   Date Value   10/26/2021 52       (goal LDL is <100)   AST (U/L)   Date Value   07/08/2020 21     ALT (U/L)   Date Value   07/08/2020 15     BUN (mg/dL)   Date Value   08/04/2020 16     BP Readings from Last 3 Encounters:   02/07/22 138/77   11/09/21 127/77   11/01/21 (!) 164/95          (goal 120/80)    All Future Testing planned in [facility]  Lab Frequency Next Occurrence   Cologuard (For External Results Only) Once 09/22/2022   Potassium Once 04/24/2022   Creatinine, Serum Once 04/24/2022   PSA, Diagnostic Once 02/07/2023         Patient Active Problem List:     Coronary artery disease s/p stent in 2014 by Dr. Seymour Perkins at [facility]     Mixed hyperlipidemia     Chest pain     Essential hypertension     Episode of syncope     Priapism

## 2022-06-13 DIAGNOSIS — I10 ESSENTIAL HYPERTENSION: ICD-10-CM

## 2022-06-13 DIAGNOSIS — I25.10 CORONARY ARTERY DISEASE INVOLVING NATIVE CORONARY ARTERY OF NATIVE HEART WITHOUT ANGINA PECTORIS: ICD-10-CM

## 2022-06-13 RX ORDER — CARVEDILOL 6.25 MG/1
TABLET ORAL
Qty: 60 TABLET | Refills: 5 | Status: SHIPPED | OUTPATIENT
Start: 2022-06-13

## 2022-06-13 NOTE — TELEPHONE ENCOUNTER
Request for Carvedilol. No appts available the moment patient currently on wait list.     Next Visit Date:  No future appointments.     Health Maintenance   Topic Date Due    Pneumococcal 65+ years Vaccine (1 - PCV) Never done    Shingles vaccine (1 of 2) Never done    Colorectal Cancer Screen  04/07/2019    COVID-19 Vaccine (3 - Booster for Pfizer series) 08/18/2021    DTaP/Tdap/Td vaccine (1 - Tdap) 10/07/2022 (Originally 6/27/1967)    Depression Screen  10/07/2022    Annual Wellness Visit (AWV)  10/08/2022    Lipids  11/11/2022    Flu vaccine  Completed    AAA screen  Completed    Hepatitis C screen  Completed    Hepatitis A vaccine  Aged Out    Hepatitis B vaccine  Aged Out    Hib vaccine  Aged Out    Meningococcal (ACWY) vaccine  Aged Out       No results found for: LABA1C          ( goal A1C is < 7)   No results found for: LABMICR  LDL Cholesterol (mg/dL)   Date Value   10/26/2021 52       (goal LDL is <100)   AST (U/L)   Date Value   07/08/2020 21     ALT (U/L)   Date Value   07/08/2020 15     BUN (mg/dL)   Date Value   08/04/2020 16     BP Readings from Last 3 Encounters:   02/07/22 138/77   11/09/21 127/77   11/01/21 (!) 164/95          (goal 120/80)    All Future Testing planned in [facility]  Lab Frequency Next Occurrence   Cologuard (For External Results Only) Once 09/22/2022   Potassium Once 04/24/2022   Creatinine, Serum Once 04/24/2022   PSA, Diagnostic Once 02/07/2023         Patient Active Problem List:     Coronary artery disease s/p stent in 2014 by Dr. Meri Stockton at [facility]     Mixed hyperlipidemia     Chest pain     Essential hypertension     Episode of syncope     Priapism

## 2022-07-20 DIAGNOSIS — I25.10 CORONARY ARTERY DISEASE INVOLVING NATIVE CORONARY ARTERY OF NATIVE HEART WITHOUT ANGINA PECTORIS: ICD-10-CM

## 2022-07-20 DIAGNOSIS — I10 ESSENTIAL HYPERTENSION: ICD-10-CM

## 2022-07-20 RX ORDER — LISINOPRIL 10 MG/1
10 TABLET ORAL DAILY
Qty: 90 TABLET | Refills: 1 | Status: SHIPPED | OUTPATIENT
Start: 2022-07-20

## 2022-08-23 ENCOUNTER — OFFICE VISIT (OUTPATIENT)
Dept: INTERNAL MEDICINE | Age: 72
End: 2022-08-23
Payer: MEDICARE

## 2022-08-23 VITALS
HEART RATE: 83 BPM | DIASTOLIC BLOOD PRESSURE: 69 MMHG | TEMPERATURE: 98.1 F | WEIGHT: 154 LBS | BODY MASS INDEX: 19.76 KG/M2 | HEIGHT: 74 IN | SYSTOLIC BLOOD PRESSURE: 126 MMHG

## 2022-08-23 DIAGNOSIS — H10.13 ACUTE ALLERGIC CONJUNCTIVITIS OF BOTH EYES: ICD-10-CM

## 2022-08-23 DIAGNOSIS — I25.10 CORONARY ARTERY DISEASE INVOLVING NATIVE CORONARY ARTERY OF NATIVE HEART WITHOUT ANGINA PECTORIS: ICD-10-CM

## 2022-08-23 DIAGNOSIS — Z00.00 HEALTH CARE MAINTENANCE: ICD-10-CM

## 2022-08-23 DIAGNOSIS — I10 WELL-CONTROLLED HYPERTENSION: Primary | ICD-10-CM

## 2022-08-23 DIAGNOSIS — T78.40XA ALLERGY, INITIAL ENCOUNTER: ICD-10-CM

## 2022-08-23 DIAGNOSIS — F10.10 AA (ALCOHOL ABUSE): ICD-10-CM

## 2022-08-23 DIAGNOSIS — E78.2 MIXED HYPERLIPIDEMIA: ICD-10-CM

## 2022-08-23 PROCEDURE — 1123F ACP DISCUSS/DSCN MKR DOCD: CPT | Performed by: STUDENT IN AN ORGANIZED HEALTH CARE EDUCATION/TRAINING PROGRAM

## 2022-08-23 PROCEDURE — 99211 OFF/OP EST MAY X REQ PHY/QHP: CPT | Performed by: STUDENT IN AN ORGANIZED HEALTH CARE EDUCATION/TRAINING PROGRAM

## 2022-08-23 PROCEDURE — 99213 OFFICE O/P EST LOW 20 MIN: CPT | Performed by: STUDENT IN AN ORGANIZED HEALTH CARE EDUCATION/TRAINING PROGRAM

## 2022-08-23 RX ORDER — FOLIC ACID 1 MG/1
1 TABLET ORAL DAILY
Qty: 90 TABLET | Refills: 1 | Status: SHIPPED | OUTPATIENT
Start: 2022-08-23

## 2022-08-23 RX ORDER — LORATADINE 10 MG/1
10 TABLET ORAL DAILY
Qty: 10 TABLET | Refills: 0 | Status: SHIPPED | OUTPATIENT
Start: 2022-08-23 | End: 2022-09-02

## 2022-08-23 RX ORDER — NITROGLYCERIN 0.4 MG/1
TABLET SUBLINGUAL
Qty: 25 TABLET | Refills: 0 | Status: SHIPPED | OUTPATIENT
Start: 2022-08-23

## 2022-08-23 RX ORDER — THIAMINE MONONITRATE (VIT B1) 100 MG
100 TABLET ORAL DAILY
Qty: 30 TABLET | Refills: 3 | Status: SHIPPED | OUTPATIENT
Start: 2022-08-23

## 2022-08-23 RX ORDER — ASPIRIN 81 MG/1
81 TABLET ORAL DAILY
Qty: 60 TABLET | Refills: 5 | Status: SHIPPED | OUTPATIENT
Start: 2022-08-23

## 2022-08-23 RX ORDER — UBIDECARENONE 75 MG
100 CAPSULE ORAL DAILY
Qty: 30 TABLET | Refills: 3 | Status: SHIPPED | OUTPATIENT
Start: 2022-08-23 | End: 2023-08-23

## 2022-08-23 ASSESSMENT — ENCOUNTER SYMPTOMS
RHINORRHEA: 1
EYE PAIN: 0
NAUSEA: 0
COUGH: 0
WHEEZING: 0
CHEST TIGHTNESS: 0
EYE ITCHING: 1
DIARRHEA: 0
CONSTIPATION: 0
EYE DISCHARGE: 1
VOMITING: 0
ABDOMINAL PAIN: 0
BACK PAIN: 0
SORE THROAT: 0
EYE REDNESS: 0
SHORTNESS OF BREATH: 0
FACIAL SWELLING: 0
BLOOD IN STOOL: 0

## 2022-08-23 ASSESSMENT — PATIENT HEALTH QUESTIONNAIRE - PHQ9
SUM OF ALL RESPONSES TO PHQ QUESTIONS 1-9: 0
SUM OF ALL RESPONSES TO PHQ QUESTIONS 1-9: 0
SUM OF ALL RESPONSES TO PHQ9 QUESTIONS 1 & 2: 0
SUM OF ALL RESPONSES TO PHQ QUESTIONS 1-9: 0
SUM OF ALL RESPONSES TO PHQ QUESTIONS 1-9: 0
1. LITTLE INTEREST OR PLEASURE IN DOING THINGS: 0
2. FEELING DOWN, DEPRESSED OR HOPELESS: 0

## 2022-08-23 NOTE — PROGRESS NOTES
[facility]  [facility] [address] [address]  Dept: [phone]  Dept Fax: [phone]    Office Progress/Follow Up Note  Date ofpatient's visit: 8/23/2022  Patient's Name:  Svetlana Castillo YOB: 1950            Patient Care Team:  Linda Portillo MD as PCP - General (Internal Medicine)  ================================================================    REASON FOR VISIT/CHIEF COMPLAINT:  Hypertension, Sinus Problem, and Health Maintenance (Declines vaccines and colon cancer screening)    HISTORY OF PRESENTING ILLNESS:  History was obtained from: patient, electronic medical record. Rafy hannon 74 y.o. is here for a follow-up visit. Hypertension: Well-controlled. Blood pressure during this visit 126/69. Patient states that he has been taking his Coreg 6.25 mg twice daily and lisinopril 10 mg daily. Patient denies any complaints of chest pain, headache, blurring of vision, shortness of breath. Seasonal allergies: Patient states that today he has been having watery eyes and some rhinorrhea for the past 3 weeks. Patient denies any crusting of eyes, sore throat, cough, shortness of breath. Patient denies any sick contacts or any fevers. Hyperlipidemia: ASCVD score 18.4. Currently taking Crestor 20 mg nightly. History of coronary artery disease status post stents in 2014: Currently taking Coreg and Lipitor. Denies complaints of chest pain. Patient states that he never got refills for aspirin. Will refill aspirin.     Patient Active Problem List   Diagnosis    Coronary artery disease s/p stent in 2014 by Dr. Zo Lopez at [facility]    Mixed hyperlipidemia    Chest pain    Essential hypertension    Episode of syncope    Priapism       Health Maintenance Due   Topic Date Due    Pneumococcal 65+ years Vaccine (1 - PCV) Never done    Shingles vaccine (1 of 2) Never done    Colorectal Cancer Screen  04/07/2019    COVID-19 Vaccine (3 - Booster for Pfizer series) 2021       No Known Allergies      Current Outpatient Medications   Medication Sig Dispense Refill    lisinopril (PRINIVIL;ZESTRIL) 10 MG tablet Take 1 tablet by mouth in the morning. 90 tablet 1    carvedilol (COREG) 6.25 MG tablet take 1 tablet by mouth twice a day with meals 60 tablet 5    rosuvastatin (CRESTOR) 20 MG tablet Take 1 tablet by mouth nightly 60 tablet 5    ASPIRIN LOW DOSE 81 MG EC tablet TAKE 1 TABLET BY MOUTH DAILY. STOP FOR 5 DAYS AFTER PROCEDURE. MAY RESUME 20 (Patient not taking: Reported on 2022) 60 tablet 5    vitamin B-1 (THIAMINE) 100 MG tablet Take 1 tablet by mouth daily (Patient not taking: Reported on 2022) 30 tablet 3    folic acid (FOLVITE) 1 MG tablet Take 1 tablet by mouth daily (Patient not taking: Reported on 2022) 90 tablet 1    vitamin B-12 (CYANOCOBALAMIN) 100 MCG tablet Take 1 tablet by mouth daily (Patient not taking: Reported on 2022) 30 tablet 3    nitroGLYCERIN (NITROSTAT) 0.4 MG SL tablet One q 5 min prn chest pain, for up to three doses. Seek medical attention after 3 doses. (Patient not taking: Reported on 2022) 25 tablet 0     No current facility-administered medications for this visit. Social History     Tobacco Use    Smoking status: Former     Packs/day: 0.25     Years: 20.00     Pack years: 5.00     Types: Cigarettes     Quit date: 1995     Years since quittin.6    Smokeless tobacco: Never   Substance Use Topics    Alcohol use: Yes     Comment: 2x a week     Drug use: No       No family history on file. REVIEW OF SYSTEMS:  Review of Systems   Constitutional:  Positive for chills. Negative for activity change, appetite change and fever. HENT:  Positive for rhinorrhea. Negative for congestion, ear discharge, ear pain, facial swelling and sore throat. Eyes:  Positive for discharge and itching. Negative for pain and redness.    Respiratory:  Negative for cough, chest tightness, shortness of breath and wheezing. Cardiovascular:  Negative for chest pain, palpitations and leg swelling. Gastrointestinal:  Negative for abdominal pain, blood in stool, constipation, diarrhea, nausea and vomiting. Genitourinary:  Negative for difficulty urinating. Musculoskeletal:  Negative for arthralgias and back pain. Neurological:  Negative for dizziness, light-headedness and headaches. Psychiatric/Behavioral:  Negative for behavioral problems, confusion and decreased concentration. PHYSICAL EXAM:  Vitals:    08/23/22 1232   BP: 126/69   Site: Left Upper Arm   Position: Sitting   Cuff Size: Medium Adult   Pulse: 83   Temp: 98.1 °F (36.7 °C)   Weight: 154 lb (69.9 kg)   Height: 6' 2\" (1.88 m)     BP Readings from Last 3 Encounters:   08/23/22 126/69   02/07/22 138/77   11/09/21 127/77        Physical Exam  Constitutional:       Appearance: Normal appearance. He is normal weight. HENT:      Head: Normocephalic and atraumatic. Eyes:      Extraocular Movements: Extraocular movements intact. Pupils: Pupils are equal, round, and reactive to light. Cardiovascular:      Rate and Rhythm: Normal rate and regular rhythm. Pulses: Normal pulses. Heart sounds: Normal heart sounds. No murmur heard. Pulmonary:      Effort: Pulmonary effort is normal. No respiratory distress. Breath sounds: Normal breath sounds. No wheezing. Chest:      Chest wall: No tenderness. Abdominal:      General: Abdomen is flat. Bowel sounds are normal. There is no distension. Palpations: Abdomen is soft. Tenderness: There is no abdominal tenderness. There is no guarding. Musculoskeletal:      Right lower leg: No edema. Left lower leg: No edema. Neurological:      Mental Status: He is alert and oriented to person, place, and time.    Psychiatric:         Behavior: Behavior normal.         DIAGNOSTIC FINDINGS:  CBC:  Lab Results   Component Value Date/Time    WBC 7.4 08/04/2020 06:12 AM HGB 8.4 08/04/2020 06:12 AM     08/04/2020 06:12 AM     10/18/2011 01:15 PM       BMP:    Lab Results   Component Value Date/Time     08/04/2020 06:12 AM    K 4.0 08/04/2020 06:12 AM     08/04/2020 06:12 AM    CO2 24 08/04/2020 06:12 AM    BUN 16 08/04/2020 06:12 AM    CREATININE 0.98 08/04/2020 06:12 AM    GLUCOSE 100 08/04/2020 06:12 AM    GLUCOSE 96 10/18/2011 01:15 PM       HEMOGLOBIN A1C: No results found for: LABA1C    FASTING LIPID PANEL:  Lab Results   Component Value Date    CHOL 129 11/20/2017    HDL 76 10/26/2021    TRIG 59 11/20/2017       ASSESSMENT AND PLAN:  Luna Joya was seen today for hypertension, sinus problem and health maintenance. Diagnoses and all orders for this visit:    Seasonal allergies  -We will give Claritin for 10 mg twice daily    Essential hypertension  -Well-controlled  -Continue taking Lipitor and Coreg 6.25 mg twice daily    Coronary artery disease s/p stent in 2014 by Dr. Nadine Farrar at [facility]  -We will refill aspirin  -Continue taking Coreg, Lipitor, Crestor    AA (alcohol abuse)  -Continue taking abstain from alcohol use  Thiamine, folic acid, cyanocobalamin  -  Mixed hyperlipidemia  -Continue taking Crestor    Priapism  -We will get PSA diagnostic levels, follow-up with urology    FOLLOW UP AND INSTRUCTIONS:  No follow-ups on file. Luna Joya received counseling on the following healthy behaviors: nutrition, exercise, and medication adherence    Discussed use, benefit, and side effects of prescribed medications. Barriers to medication compliance addressed. All patient questions answered. Pt voiced understanding. Patient given educational materials - see patient instructions        Madisyn Campos MD  Internal Medicine Resident, PGY-3  [facility] 2;  [place], [place]  4/38/4954,1:09 PM

## 2022-08-23 NOTE — PROGRESS NOTES
Attending Physician Statement  I have discussed the care of John Nguyễn, including pertinent history and exam finding  I agree with the assessment, and status of the problem list as documented. The plan and orders should include   Orders Placed This Encounter   Procedures    CBC with Auto Differential    Lipid, Fasting    PSA, Diagnostic    Comprehensive Metabolic Panel    and this was also documented by the resident. .The medication list was reviewed with the resident and is up to date. The return visit should be in 3 months . Diagnosis Orders   1. Well-controlled hypertension  CBC with Auto Differential    Lipid, Fasting    Comprehensive Metabolic Panel      2. Coronary artery disease s/p stent in 2014 by Dr. Nadine Farrar at [facility]  Lipid, Fasting    aspirin (ASPIRIN LOW DOSE) 81 MG EC tablet    nitroGLYCERIN (NITROSTAT) 0.4 MG SL tablet      3. AA (alcohol abuse)  CBC with Auto Differential    folic acid (FOLVITE) 1 MG tablet    vitamin B-1 (THIAMINE) 100 MG tablet    vitamin B-12 (CYANOCOBALAMIN) 100 MCG tablet      4. Mixed hyperlipidemia        5. Acute allergic conjunctivitis of both eyes  loratadine (CLARITIN) 10 MG tablet      6.  Allergy, initial encounter  loratadine (CLARITIN) 10 MG tablet           Hay Delacruz MD   Attending Physician, [facility] [place]   Faculty, Internal Medicine Residency Program  [address] [facility] [place] [facility] [place]

## 2022-09-15 ENCOUNTER — TELEPHONE (OUTPATIENT)
Dept: UROLOGY | Age: 72
End: 2022-09-15

## 2022-12-14 DIAGNOSIS — I25.10 CORONARY ARTERY DISEASE INVOLVING NATIVE CORONARY ARTERY OF NATIVE HEART WITHOUT ANGINA PECTORIS: ICD-10-CM

## 2022-12-14 DIAGNOSIS — I10 ESSENTIAL HYPERTENSION: ICD-10-CM

## 2022-12-15 NOTE — TELEPHONE ENCOUNTER
Coreg  Pt has a future appt scheduled     Health Maintenance   Topic Date Due    Pneumococcal 65+ years Vaccine (1 - PCV) Never done    Colorectal Cancer Screen  04/07/2019    COVID-19 Vaccine (3 - Booster for Pfizer series) 05/13/2021    Flu vaccine (1) 08/01/2022    Annual Wellness Visit (AWV)  10/08/2022    Lipids  11/11/2022    DTaP/Tdap/Td vaccine (1 - Tdap) 11/28/2023 (Originally 6/27/1969)    Shingles vaccine (1 of 2) 11/28/2023 (Originally 6/27/1998)    Depression Screen  08/23/2023    AAA screen  Completed    Hepatitis C screen  Completed    Hepatitis A vaccine  Aged Out    Hib vaccine  Aged Out    Meningococcal (ACWY) vaccine  Aged Out             (applicable per patient's age: Cancer Screenings, Depression Screening, Fall Risk Screening, Immunizations)    LDL Cholesterol (mg/dL)   Date Value   10/26/2021 52     AST (U/L)   Date Value   07/08/2020 21     ALT (U/L)   Date Value   07/08/2020 15     BUN (mg/dL)   Date Value   08/04/2020 16      (goal A1C is < 7)   (goal LDL is <100) need 30-50% reduction from baseline     BP Readings from Last 3 Encounters:   08/23/22 126/69   02/07/22 138/77   11/09/21 127/77    (goal /80)      All Future Testing planned in [facility]:  Lab Frequency Next Occurrence   CBC with Auto Differential Once 08/23/2022   Lipid, Fasting Once 08/23/2022   PSA, Diagnostic Once 08/23/2022   Comprehensive Metabolic Panel Once 45/58/0688       Next Visit Date:  Future Appointments   Date Time Provider Hieu Mock   1/3/2023  1:20 PM Navin Solomon MD [id number]            Patient Active Problem List:     Coronary artery disease s/p stent in 2014 by Dr. Kia He at [facility]     Mixed hyperlipidemia     Chest pain     Essential hypertension     Episode of syncope     Priapism

## 2022-12-16 RX ORDER — CARVEDILOL 6.25 MG/1
TABLET ORAL
Qty: 60 TABLET | Refills: 5 | Status: SHIPPED | OUTPATIENT
Start: 2022-12-16

## 2023-01-09 DIAGNOSIS — I10 ESSENTIAL HYPERTENSION: ICD-10-CM

## 2023-01-09 DIAGNOSIS — I25.10 CORONARY ARTERY DISEASE INVOLVING NATIVE CORONARY ARTERY OF NATIVE HEART WITHOUT ANGINA PECTORIS: ICD-10-CM

## 2023-01-09 RX ORDER — LISINOPRIL 10 MG/1
TABLET ORAL
Qty: 90 TABLET | Refills: 1 | Status: SHIPPED | OUTPATIENT
Start: 2023-01-09

## 2023-01-09 NOTE — TELEPHONE ENCOUNTER
Request for Lisinopril.     L  Next Visit Date:  Future Appointments   Date Time Provider Hieu Emili   1/17/2023  2:20 PM Monik Jack MD [address] [facility]   Topic Date Due    Pneumococcal 65+ years Vaccine (1 - PCV) Never done    Colorectal Cancer Screen  04/07/2019    COVID-19 Vaccine (3 - Booster for Pfizer series) 05/13/2021    Flu vaccine (1) 08/01/2022    Annual Wellness Visit (AWV)  10/08/2022    Lipids  11/11/2022    DTaP/Tdap/Td vaccine (1 - Tdap) 11/28/2023 (Originally 6/27/1969)    Shingles vaccine (1 of 2) 11/28/2023 (Originally 6/27/1998)    Depression Screen  08/23/2023    AAA screen  Completed    Hepatitis C screen  Completed    Hepatitis A vaccine  Aged Out    Hib vaccine  Aged Out    Meningococcal (ACWY) vaccine  Aged Out       No results found for: LABA1C          ( goal A1C is < 7)   No results found for: LABMICR  LDL Cholesterol (mg/dL)   Date Value   10/26/2021 52       (goal LDL is <100)   AST (U/L)   Date Value   07/08/2020 21     ALT (U/L)   Date Value   07/08/2020 15     BUN (mg/dL)   Date Value   08/04/2020 16     BP Readings from Last 3 Encounters:   08/23/22 126/69   02/07/22 138/77   11/09/21 127/77          (goal 120/80)    All Future Testing planned in [facility]  Lab Frequency Next Occurrence   CBC with Auto Differential Once 08/23/2022   Lipid, Fasting Once 08/23/2022   PSA, Diagnostic Once 08/23/2022   Comprehensive Metabolic Panel Once 32/48/0211         Patient Active Problem List:     Coronary artery disease s/p stent in 2014 by Dr. Boone Mays at [facility]     Mixed hyperlipidemia     Chest pain     Essential hypertension     Episode of syncope     Priapism

## 2023-01-12 ENCOUNTER — HOSPITAL ENCOUNTER (OUTPATIENT)
Age: 73
Setting detail: SPECIMEN
Discharge: HOME | End: 2023-01-12

## 2023-01-12 DIAGNOSIS — I25.10 CORONARY ARTERY DISEASE INVOLVING NATIVE CORONARY ARTERY OF NATIVE HEART WITHOUT ANGINA PECTORIS: ICD-10-CM

## 2023-01-12 DIAGNOSIS — I10 WELL-CONTROLLED HYPERTENSION: ICD-10-CM

## 2023-01-12 DIAGNOSIS — F10.10 AA (ALCOHOL ABUSE): ICD-10-CM

## 2023-01-12 DIAGNOSIS — Z00.00 HEALTH CARE MAINTENANCE: ICD-10-CM

## 2023-01-12 LAB
ABSOLUTE EOS #: 0.21 K/UL (ref 0–0.44)
ABSOLUTE IMMATURE GRANULOCYTE: 0.03 K/UL (ref 0–0.3)
ABSOLUTE LYMPH #: 2.43 K/UL (ref 1.1–3.7)
ABSOLUTE MONO #: 1.24 K/UL (ref 0.1–1.2)
ALBUMIN SERPL-MCNC: 3.7 G/DL (ref 3.5–5.2)
ALBUMIN/GLOBULIN RATIO: 1.1 (ref 1–2.5)
ALP BLD-CCNC: 81 U/L (ref 40–129)
ALT SERPL-CCNC: 30 U/L (ref 5–41)
ANION GAP SERPL CALCULATED.3IONS-SCNC: 13 MMOL/L (ref 9–17)
AST SERPL-CCNC: 41 U/L
BASOPHILS # BLD: 1 % (ref 0–2)
BASOPHILS ABSOLUTE: 0.05 K/UL (ref 0–0.2)
BILIRUB SERPL-MCNC: 0.5 MG/DL (ref 0.3–1.2)
BUN BLDV-MCNC: 11 MG/DL (ref 8–23)
CALCIUM SERPL-MCNC: 9.6 MG/DL (ref 8.6–10.4)
CHLORIDE BLD-SCNC: 103 MMOL/L (ref 98–107)
CHOLESTEROL, FASTING: 88 MG/DL
CHOLESTEROL/HDL RATIO: 2.3
CO2: 24 MMOL/L (ref 20–31)
CREAT SERPL-MCNC: 0.93 MG/DL (ref 0.7–1.2)
EOSINOPHILS RELATIVE PERCENT: 2 % (ref 1–4)
GFR SERPL CREATININE-BSD FRML MDRD: >60 ML/MIN/1.73M2
GLUCOSE BLD-MCNC: 89 MG/DL (ref 70–99)
HCT VFR BLD CALC: 36.2 % (ref 40.7–50.3)
HDLC SERPL-MCNC: 38 MG/DL
HEMOGLOBIN: 12.3 G/DL (ref 13–17)
IMMATURE GRANULOCYTES: 0 %
LDL CHOLESTEROL: 37 MG/DL (ref 0–130)
LYMPHOCYTES # BLD: 25 % (ref 24–43)
MCH RBC QN AUTO: 32.5 PG (ref 25.2–33.5)
MCHC RBC AUTO-ENTMCNC: 34 G/DL (ref 28.4–34.8)
MCV RBC AUTO: 95.5 FL (ref 82.6–102.9)
MONOCYTES # BLD: 13 % (ref 3–12)
NRBC AUTOMATED: 0 PER 100 WBC
PDW BLD-RTO: 12.6 % (ref 11.8–14.4)
PLATELET # BLD: 391 K/UL (ref 138–453)
PMV BLD AUTO: 10.5 FL (ref 8.1–13.5)
POTASSIUM SERPL-SCNC: 4 MMOL/L (ref 3.7–5.3)
PROSTATE SPECIFIC ANTIGEN: 2.98 NG/ML
RBC # BLD: 3.79 M/UL (ref 4.21–5.77)
SEG NEUTROPHILS: 59 % (ref 36–65)
SEGMENTED NEUTROPHILS ABSOLUTE COUNT: 5.78 K/UL (ref 1.5–8.1)
SODIUM BLD-SCNC: 140 MMOL/L (ref 135–144)
TOTAL PROTEIN: 7.2 G/DL (ref 6.4–8.3)
TRIGLYCERIDE, FASTING: 64 MG/DL
WBC # BLD: 9.7 K/UL (ref 3.5–11.3)

## 2023-01-17 ENCOUNTER — OFFICE VISIT (OUTPATIENT)
Dept: INTERNAL MEDICINE | Age: 73
End: 2023-01-17

## 2023-01-17 VITALS
HEIGHT: 74 IN | SYSTOLIC BLOOD PRESSURE: 137 MMHG | TEMPERATURE: 98.1 F | BODY MASS INDEX: 20.66 KG/M2 | OXYGEN SATURATION: 95 % | WEIGHT: 161 LBS | HEART RATE: 84 BPM | DIASTOLIC BLOOD PRESSURE: 82 MMHG

## 2023-01-17 DIAGNOSIS — Z86.010 HISTORY OF COLON POLYPS: ICD-10-CM

## 2023-01-17 DIAGNOSIS — E78.2 MIXED HYPERLIPIDEMIA: ICD-10-CM

## 2023-01-17 DIAGNOSIS — J06.9 UPPER RESPIRATORY TRACT INFECTION, UNSPECIFIED TYPE: Primary | ICD-10-CM

## 2023-01-17 DIAGNOSIS — I10 ESSENTIAL HYPERTENSION: ICD-10-CM

## 2023-01-17 DIAGNOSIS — I25.10 CORONARY ARTERY DISEASE INVOLVING NATIVE CORONARY ARTERY OF NATIVE HEART WITHOUT ANGINA PECTORIS: ICD-10-CM

## 2023-01-17 PROBLEM — R55 EPISODE OF SYNCOPE: Status: RESOLVED | Noted: 2019-10-31 | Resolved: 2023-01-17

## 2023-01-17 SDOH — ECONOMIC STABILITY: FOOD INSECURITY: WITHIN THE PAST 12 MONTHS, YOU WORRIED THAT YOUR FOOD WOULD RUN OUT BEFORE YOU GOT MONEY TO BUY MORE.: NEVER TRUE

## 2023-01-17 SDOH — ECONOMIC STABILITY: FOOD INSECURITY: WITHIN THE PAST 12 MONTHS, THE FOOD YOU BOUGHT JUST DIDN'T LAST AND YOU DIDN'T HAVE MONEY TO GET MORE.: NEVER TRUE

## 2023-01-17 ASSESSMENT — SOCIAL DETERMINANTS OF HEALTH (SDOH): HOW HARD IS IT FOR YOU TO PAY FOR THE VERY BASICS LIKE FOOD, HOUSING, MEDICAL CARE, AND HEATING?: NOT HARD AT ALL

## 2023-01-17 ASSESSMENT — ENCOUNTER SYMPTOMS
CHEST TIGHTNESS: 1
ABDOMINAL PAIN: 0
WHEEZING: 0
CONSTIPATION: 0
SHORTNESS OF BREATH: 0
DIARRHEA: 0
BLOOD IN STOOL: 0
VOMITING: 0
COUGH: 1
NAUSEA: 0

## 2023-01-17 ASSESSMENT — PATIENT HEALTH QUESTIONNAIRE - PHQ9
2. FEELING DOWN, DEPRESSED OR HOPELESS: 0
SUM OF ALL RESPONSES TO PHQ QUESTIONS 1-9: 0
SUM OF ALL RESPONSES TO PHQ QUESTIONS 1-9: 0
SUM OF ALL RESPONSES TO PHQ9 QUESTIONS 1 & 2: 0
SUM OF ALL RESPONSES TO PHQ QUESTIONS 1-9: 0
1. LITTLE INTEREST OR PLEASURE IN DOING THINGS: 0
SUM OF ALL RESPONSES TO PHQ QUESTIONS 1-9: 0

## 2023-01-17 NOTE — PROGRESS NOTES
[facility] [address] [address]  Dept: [phone]  Dept Fax: [phone]    Office Progress/Follow Up Note  Date ofpatient's visit: 1/17/2023  Patient's Name:  Christa Melendez YOB: 1950            Patient Care Team:  Augustin Meek MD as PCP - General (Internal Medicine)  ================================================================    REASON FOR VISIT/CHIEF COMPLAINT:  Hypertension (Here for follow up) and Cough (Coughing for past 3 weeks, using nyquil and robitussin for treatment. )    HISTORY OF PRESENTING ILLNESS:  History was obtained from: patient, electronic medical record. Onel hannon 74 y.o. is here for a follow-up visit. Upper respiratory tract infection: Patient states he started having upper respiratory tract infection symptoms 3 weeks ago which comprised of cough with yellow phlegm, congestion, fevers and chills. Patient states this started after the holidays and he probably had a sick contact but is not sure. Patient states that he has taken over-the-counter NyQuil and Robitussin and they have drastically improved the symptoms. Currently denies any fevers or chills and has some remittent cough with no issues with breathing. Hypertension: Well-controlled. Blood pressure during this visit 137/82. Patient states that he has been taking his Coreg 6.25 mg twice daily and lisinopril 10 mg daily. Patient denies any complaints of chest pain, headache, blurring of vision, shortness of breath    History of colonic polyps: Last colonoscopy 2016 where colon polyps were removed. Patient has not followed up with a GI doctor. Denies any symptoms of blood in stools, constipation, diarrhea. No significant family history of colon cancer.     Patient Active Problem List   Diagnosis    Coronary artery disease s/p stent in 2014 by Dr. Sindy Ingram at [facility]    Mixed hyperlipidemia    Chest pain    Essential hypertension    Episode of syncope    Priapism       Health Maintenance Due   Topic Date Due    Pneumococcal 65+ years Vaccine (1 - PCV) Never done    Colorectal Cancer Screen  2019    COVID-19 Vaccine (3 - Booster for Pfizer series) 2021    Flu vaccine (1) 2022    Annual Wellness Visit (AWV)  10/08/2022       No Known Allergies      Current Outpatient Medications   Medication Sig Dispense Refill    lisinopril (PRINIVIL;ZESTRIL) 10 MG tablet take 1 tablet by mouth every morning 90 tablet 1    carvedilol (COREG) 6.25 MG tablet take 1 tablet by mouth twice a day with meals 60 tablet 5    aspirin (ASPIRIN LOW DOSE) 81 MG EC tablet Take 1 tablet by mouth daily 60 tablet 5    folic acid (FOLVITE) 1 MG tablet Take 1 tablet by mouth daily 90 tablet 1    vitamin B-1 (THIAMINE) 100 MG tablet Take 1 tablet by mouth daily 30 tablet 3    vitamin B-12 (CYANOCOBALAMIN) 100 MCG tablet Take 1 tablet by mouth daily 30 tablet 3    nitroGLYCERIN (NITROSTAT) 0.4 MG SL tablet One q 5 min prn chest pain, for up to three doses. Seek medical attention after 3 doses. 25 tablet 0    rosuvastatin (CRESTOR) 20 MG tablet Take 1 tablet by mouth nightly 60 tablet 5     No current facility-administered medications for this visit. Social History     Tobacco Use    Smoking status: Former     Packs/day: 0.25     Years: 20.00     Pack years: 5.00     Types: Cigarettes     Quit date: 1995     Years since quittin.0    Smokeless tobacco: Never   Substance Use Topics    Alcohol use: Yes     Comment: 2x a week     Drug use: No       No family history on file. REVIEW OF SYSTEMS:  Review of Systems   Constitutional:  Negative for activity change, appetite change and fever. HENT:  Positive for congestion. Respiratory:  Positive for cough and chest tightness. Negative for shortness of breath and wheezing. Cardiovascular:  Negative for chest pain, palpitations and leg swelling.    Gastrointestinal:  Negative for abdominal pain, blood in stool, constipation, diarrhea, nausea and vomiting. Neurological:  Negative for dizziness, light-headedness and headaches. Psychiatric/Behavioral:  Negative for behavioral problems, confusion and decreased concentration. PHYSICAL EXAM:  Vitals:    01/17/23 1406   BP: 137/82   Pulse: 84   Temp: 98.1 °F (36.7 °C)   SpO2: 95%   Weight: 161 lb (73 kg)   Height: 6' 2\" (1.88 m)     BP Readings from Last 3 Encounters:   01/17/23 137/82   08/23/22 126/69   02/07/22 138/77        Physical Exam  Constitutional:       General: He is not in acute distress. Appearance: Normal appearance. He is not toxic-appearing. HENT:      Head: Normocephalic and atraumatic. Cardiovascular:      Rate and Rhythm: Normal rate and regular rhythm. Heart sounds: Normal heart sounds. Pulmonary:      Effort: Pulmonary effort is normal. No respiratory distress. Breath sounds: Normal breath sounds. No wheezing, rhonchi or rales. Abdominal:      General: Bowel sounds are normal. There is no distension. Palpations: Abdomen is soft. Tenderness: There is no abdominal tenderness. Musculoskeletal:      Right lower leg: No edema. Left lower leg: No edema. Neurological:      Mental Status: He is alert and oriented to person, place, and time.    Psychiatric:         Behavior: Behavior normal.         DIAGNOSTIC FINDINGS:  CBC:  Lab Results   Component Value Date/Time    WBC 9.7 01/12/2023 01:50 PM    HGB 12.3 01/12/2023 01:50 PM     01/12/2023 01:50 PM     10/18/2011 01:15 PM       BMP:    Lab Results   Component Value Date/Time     01/12/2023 01:50 PM    K 4.0 01/12/2023 01:50 PM     01/12/2023 01:50 PM    CO2 24 01/12/2023 01:50 PM    BUN 11 01/12/2023 01:50 PM    CREATININE 0.93 01/12/2023 01:50 PM    GLUCOSE 89 01/12/2023 01:50 PM    GLUCOSE 96 10/18/2011 01:15 PM       HEMOGLOBIN A1C: No results found for: LABA1C    FASTING LIPID PANEL:  Lab Results Component Value Date    CHOL 129 11/20/2017    HDL 38 (L) 01/12/2023    TRIG 59 11/20/2017       ASSESSMENT AND PLAN:  Tika Valles was seen today for hypertension and cough. Diagnoses and all orders for this visit:    Upper respiratory tract infection, unspecified type  -Improving. Continue using NyQuil and Robitussin. Discussed with the patient that if symptoms worsen, develops fevers, or cough worsens with change in color, consistency of phlegm would recommend calling the office and we will prescribe antibiotics. Essential hypertension  -Well-controlled. Continue taking lisinopril 10 mg and Coreg 6.25 mg twice daily. History of colon polyps  -Referral given for GI for evaluation of repeat colonoscopy given history of colonic polyps in 2016     Mixed hyperlipidemia  -Continues Crestor 20 mg daily. Coronary artery disease s/p stent in 2014 by Dr. Marisol Prince at [facility]  -Continue using Coreg, lisinopril and Crestor daily. Patient offered pneumococcal vaccine-Refused pneumococcal vaccination. FOLLOW UP AND INSTRUCTIONS:  No follow-ups on file. Tika Valles received counseling on the following healthy behaviors: nutrition, exercise, and medication adherence    Discussed use, benefit, and side effects of prescribed medications. Barriers to medication compliance addressed. All patient questions answered. Pt voiced understanding. Patient given educational materials - see patient instructions      Yobany Matthew MD  Internal Medicine Resident, PGY-3  [id number]  1/17/2023,3:04 PM

## 2023-01-19 NOTE — PATIENT INSTRUCTIONS
2023       Johann Maurer MD  [address]  [facility] [address]  Via In Basket      Patient: Teodoro Quiroz   YOB: 1969   Date of Visit: 2023       Dear Dr. Maurer:    I saw your patient, Teodoro Quiroz, for an evaluation. Below are my notes for this visit with him.    If you have questions, please do not hesitate to call me.      Sincerely,        Yamil Villafuerte MD        CC: No Recipients  Yamil Villafuerte MD  2023  1:26 PM  Signed  COLON AND RECTAL SURGERY CONSULT  2023     Patient: Teodoro Quiroz  MRN:[id number]  : 1969    Reason for visit: Teodoro Quiroz is a(n) 53 year old male here today for a consultation for anal warts . Teodoro Quiroz is accompanied by no one.    Referred by:   Brisa Sun PA-C     History of present illness:  This is a 53 year old male that presents to the office for anal warts    54 y/o man with no significant medical hx who presents to the office with growths around the anus.  Pt is MSM who noticed a few months ago some growths around his anus in the shower.  There was no pain or irritation associated with the growths but he thought that they were hemorrhoids and was putting cream on them.  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  He also notes that when he does participate in anal receptive sex he has a hemorrhoid that prolapses and bleeds for 1 week after.  His last partner was 6 months ago.  No blood in his stool, no weight loss, no changes in bowel habits.   Last colonoscopy 1 year no polyps  Family hx of colon cancer: mother diagnosed @59    Review of systems:  Const: Normal.   Allergy & Immunology: Normal.   Eyes: Normal.   ENT: Normal.   CV: Normal.   Resp: Normal.   Breast: Normal.   GI:. Per HPI.   : Normal.   Endo: Normal.   Heme/Lymph: Normal.   Musc: Normal.   Neuro: Normal.   Psych: Normal.   Skin: Normal.    Active problems:  There is no problem  Your medications for this visit were escribed to your preferred pharmacy. Avs was given and reviewed appt card given with next appt.  MS list on file for this patient.      Past medical history:  Past Medical History:   Diagnosis Date   • Insomnia        Surgical history:   No past surgical history on file.    Social history:  Social History     Socioeconomic History   • Marital status: Single     Spouse name: Not on file   • Number of children: Not on file   • Years of education: Not on file   • Highest education level: Not on file   Occupational History   • Not on file   Tobacco Use   • Smoking status: Every Day   • Smokeless tobacco: Never   Substance and Sexual Activity   • Alcohol use: Yes   • Drug use: Never   • Sexual activity: Not on file   Other Topics Concern   • Not on file   Social History Narrative   • Not on file     Social Determinants of Health     Financial Resource Strain: Not on file   Food Insecurity: Not on file   Transportation Needs: Not on file   Physical Activity: Not on file   Stress: Not on file   Social Connections: Not on file   Intimate Partner Violence: Not on file       Family history:  Family History   Problem Relation Age of Onset   • Cancer, Colon Mother    • Heart disease Father    • Cancer, Kidney Sister    • Thyroid Brother    • Cirrhosis Maternal Grandfather    • Myocardial Infarction Paternal Grandmother    • Myocardial Infarction Paternal Grandfather        Review:  Past medical history, problem list, family medical history, surgical history and social history reviewed.     Allergies:   ALLERGIES:   Allergen Reactions   • Bee Sting ANAPHYLAXIS   • Penicillins HIVES     Cerner Allergy Text Annotation: penicillins         Current medications:  Current Outpatient Medications   Medication Sig   • tadalafil (CIALIS) 20 MG tablet Take 20 mg by mouth daily as needed.   • emtricitabine-tenofovir (TRUVADA) 200-300 MG per tablet Take 1 tablet by mouth daily.   • Melatonin 5 MG Cap    • Multiple Vitamins-Minerals (MULTIVITAL-M PO)      No current facility-administered medications for this visit.       Vitals:  Visit  Vitals  /77 (BP Location: LUE - Left upper extremity)   Pulse 70   Temp 97.9 °F (36.6 °C)   Resp 18   SpO2 99%       Physical exam:  Constitutional: alert, in no acute distress and current vital signs reviewed.   Eyes: normal conjunctiva, no eyelid swelling and the sclerae were normal.   Neck: normal appearing neck, supple neck and no mass was seen.   Pulmonary: no respiratory distress, normal respiratory rate and effort and no accessory muscle use. breath sounds clear to auscultation bilaterally.   Cardiovascular: normal rate, no murmurs were heard, regular rhythm, normal S1 and normal S2.   Abdomen: soft, nontender, nondistended, normal bowel sounds and no abdominal mass. no hepatomegaly and no splenomegaly.   Rectal: Inspection: Circumfrential anal condyloma mild/moderate disease burden without any large lesions, no eryhthema or induration large left lateral hemorrhoid external/internal component Digital: good rectal tone, no masses, non tender  Psychiatric: oriented to person, oriented to place and oriented to time. alert and awake.   Skin, Hair, Nails: normal skin color and pigmentation.     Procedure:    Procedure: Anoscopy  Indications: other: Condyloma  Risks, benefits, alternatives, bleeding risk and risk of potential pain were discussed with the patient.  Procedure note: With a lubricated, gloved index finger, I gently performed a 360 degree sweep of the rectum checking for anal sphincter tone, tenderness, nodules, and masses.  Following gentle dilation with a digital rectal exam, I inserted a lubricated anoscope with the obturator completely inserted.  The obturator was removed after fully inserting the anoscope.  The endoscope was slowly withdrawn, and the rectal and anal mucosa exam at over 360 degrees.  Findings: Other: Internal condyloma noted, Grade II internal hemorrhoids not friable  Patient status: The patient tolerated the procedure well.  Complications: There were no  complications.    Assessment:   Anal condyloma    Plan:    EUA with fulgurization  Possible excisional hemorrhoidectomy after condyloma recovery    Thank you for involving me in the patient's care.     Yamil Villafuerte MD  Colon and Rectal Surgery, Department of Surgery  [place] [address], [place] [address]

## 2023-01-20 NOTE — PROGRESS NOTES
Attending Physician Statement  I have discussed the care of [address]  including pertinent history and exam findings,  with the resident. I have reviewed the key elements of all parts of the encounter with the resident. I agree with the assessment, plan and orders as documented by the resident.     Erick Vuong MD  1/20/2023

## 2023-01-27 ENCOUNTER — APPOINTMENT (OUTPATIENT)
Dept: CT IMAGING | Age: 73
DRG: 392 | End: 2023-01-27
Payer: MEDICARE

## 2023-01-27 ENCOUNTER — HOSPITAL ENCOUNTER (INPATIENT)
Age: 73
LOS: 5 days | Discharge: HOME | DRG: 392 | End: 2023-02-01
Attending: EMERGENCY MEDICINE | Admitting: SURGERY
Payer: MEDICARE

## 2023-01-27 DIAGNOSIS — K57.92 DIVERTICULITIS: Primary | ICD-10-CM

## 2023-01-27 LAB
ABSOLUTE EOS #: 0.32 K/UL (ref 0–0.44)
ABSOLUTE IMMATURE GRANULOCYTE: 0.07 K/UL (ref 0–0.3)
ABSOLUTE LYMPH #: 1.62 K/UL (ref 1.1–3.7)
ABSOLUTE MONO #: 1.39 K/UL (ref 0.1–1.2)
ALBUMIN SERPL-MCNC: 3.2 G/DL (ref 3.5–5.2)
ALBUMIN/GLOBULIN RATIO: 0.8 (ref 1–2.5)
ALP BLD-CCNC: 93 U/L (ref 40–129)
ALT SERPL-CCNC: 13 U/L (ref 5–41)
ANION GAP SERPL CALCULATED.3IONS-SCNC: 11 MMOL/L (ref 9–17)
AST SERPL-CCNC: 18 U/L
BASOPHILS # BLD: 0 % (ref 0–2)
BASOPHILS ABSOLUTE: 0.04 K/UL (ref 0–0.2)
BILIRUB SERPL-MCNC: 0.8 MG/DL (ref 0.3–1.2)
BILIRUBIN DIRECT: 0.2 MG/DL
BILIRUBIN URINE: NEGATIVE
BILIRUBIN, INDIRECT: 0.6 MG/DL (ref 0–1)
BUN BLDV-MCNC: 9 MG/DL (ref 8–23)
CALCIUM SERPL-MCNC: 9 MG/DL (ref 8.6–10.4)
CASTS UA: ABNORMAL /LPF (ref 0–8)
CHLORIDE BLD-SCNC: 100 MMOL/L (ref 98–107)
CO2: 25 MMOL/L (ref 20–31)
COLOR: YELLOW
CREAT SERPL-MCNC: 0.65 MG/DL (ref 0.7–1.2)
EOSINOPHILS RELATIVE PERCENT: 2 % (ref 1–4)
EPITHELIAL CELLS UA: ABNORMAL /HPF (ref 0–5)
GFR SERPL CREATININE-BSD FRML MDRD: >60 ML/MIN/1.73M2
GLUCOSE BLD-MCNC: 114 MG/DL (ref 70–99)
GLUCOSE URINE: NEGATIVE
HCT VFR BLD CALC: 35 % (ref 40.7–50.3)
HEMOGLOBIN: 12 G/DL (ref 13–17)
IMMATURE GRANULOCYTES: 1 %
INR BLD: 1
KETONES, URINE: ABNORMAL
LACTIC ACID, WHOLE BLOOD: 1.5 MMOL/L (ref 0.7–2.1)
LEUKOCYTE ESTERASE, URINE: NEGATIVE
LIPASE: 21 U/L (ref 13–60)
LYMPHOCYTES # BLD: 11 % (ref 24–43)
MAGNESIUM: 1.4 MG/DL (ref 1.6–2.6)
MCH RBC QN AUTO: 31.7 PG (ref 25.2–33.5)
MCHC RBC AUTO-ENTMCNC: 34.3 G/DL (ref 28.4–34.8)
MCV RBC AUTO: 92.6 FL (ref 82.6–102.9)
MONOCYTES # BLD: 10 % (ref 3–12)
NITRITE, URINE: NEGATIVE
NRBC AUTOMATED: 0 PER 100 WBC
PDW BLD-RTO: 12.6 % (ref 11.8–14.4)
PH UA: 6.5 (ref 5–8)
PLATELET # BLD: 368 K/UL (ref 138–453)
PMV BLD AUTO: 9.8 FL (ref 8.1–13.5)
POTASSIUM SERPL-SCNC: 3.3 MMOL/L (ref 3.7–5.3)
PROTEIN UA: ABNORMAL
PROTHROMBIN TIME: 10.7 SEC (ref 9.1–12.3)
RBC # BLD: 3.78 M/UL (ref 4.21–5.77)
RBC UA: ABNORMAL /HPF (ref 0–4)
SEG NEUTROPHILS: 76 % (ref 36–65)
SEGMENTED NEUTROPHILS ABSOLUTE COUNT: 11.22 K/UL (ref 1.5–8.1)
SODIUM BLD-SCNC: 136 MMOL/L (ref 135–144)
SPECIFIC GRAVITY UA: 1.03 (ref 1–1.03)
TOTAL PROTEIN: 7.3 G/DL (ref 6.4–8.3)
TURBIDITY: CLEAR
URINE HGB: NEGATIVE
UROBILINOGEN, URINE: NORMAL
WBC # BLD: 14.7 K/UL (ref 3.5–11.3)
WBC UA: ABNORMAL /HPF (ref 0–5)

## 2023-01-27 PROCEDURE — 2580000003 HC RX 258: Performed by: STUDENT IN AN ORGANIZED HEALTH CARE EDUCATION/TRAINING PROGRAM

## 2023-01-27 PROCEDURE — 6360000002 HC RX W HCPCS: Performed by: STUDENT IN AN ORGANIZED HEALTH CARE EDUCATION/TRAINING PROGRAM

## 2023-01-27 PROCEDURE — 6360000002 HC RX W HCPCS

## 2023-01-27 PROCEDURE — 83605 ASSAY OF LACTIC ACID: CPT

## 2023-01-27 PROCEDURE — 99285 EMERGENCY DEPT VISIT HI MDM: CPT

## 2023-01-27 PROCEDURE — 6360000004 HC RX CONTRAST MEDICATION: Performed by: STUDENT IN AN ORGANIZED HEALTH CARE EDUCATION/TRAINING PROGRAM

## 2023-01-27 PROCEDURE — 85610 PROTHROMBIN TIME: CPT

## 2023-01-27 PROCEDURE — 6370000000 HC RX 637 (ALT 250 FOR IP)

## 2023-01-27 PROCEDURE — 74177 CT ABD & PELVIS W/CONTRAST: CPT

## 2023-01-27 PROCEDURE — 80048 BASIC METABOLIC PNL TOTAL CA: CPT

## 2023-01-27 PROCEDURE — 96374 THER/PROPH/DIAG INJ IV PUSH: CPT

## 2023-01-27 PROCEDURE — 83735 ASSAY OF MAGNESIUM: CPT

## 2023-01-27 PROCEDURE — 80076 HEPATIC FUNCTION PANEL: CPT

## 2023-01-27 PROCEDURE — 1200000000 HC SEMI PRIVATE

## 2023-01-27 PROCEDURE — 83690 ASSAY OF LIPASE: CPT

## 2023-01-27 PROCEDURE — 2580000003 HC RX 258

## 2023-01-27 PROCEDURE — 87040 BLOOD CULTURE FOR BACTERIA: CPT

## 2023-01-27 PROCEDURE — 96361 HYDRATE IV INFUSION ADD-ON: CPT

## 2023-01-27 PROCEDURE — 85025 COMPLETE CBC W/AUTO DIFF WBC: CPT

## 2023-01-27 PROCEDURE — 96375 TX/PRO/DX INJ NEW DRUG ADDON: CPT

## 2023-01-27 PROCEDURE — 81001 URINALYSIS AUTO W/SCOPE: CPT

## 2023-01-27 PROCEDURE — 6370000000 HC RX 637 (ALT 250 FOR IP): Performed by: STUDENT IN AN ORGANIZED HEALTH CARE EDUCATION/TRAINING PROGRAM

## 2023-01-27 RX ORDER — ACETAMINOPHEN 500 MG
1000 TABLET ORAL EVERY 8 HOURS
Status: DISCONTINUED | OUTPATIENT
Start: 2023-01-27 | End: 2023-02-01 | Stop reason: HOSPADM

## 2023-01-27 RX ORDER — MORPHINE SULFATE 4 MG/ML
2 INJECTION, SOLUTION INTRAMUSCULAR; INTRAVENOUS ONCE
Status: COMPLETED | OUTPATIENT
Start: 2023-01-27 | End: 2023-01-27

## 2023-01-27 RX ORDER — ENOXAPARIN SODIUM 100 MG/ML
40 INJECTION SUBCUTANEOUS DAILY
Status: DISCONTINUED | OUTPATIENT
Start: 2023-01-27 | End: 2023-02-01 | Stop reason: HOSPADM

## 2023-01-27 RX ORDER — SODIUM CHLORIDE, SODIUM LACTATE, POTASSIUM CHLORIDE, CALCIUM CHLORIDE 600; 310; 30; 20 MG/100ML; MG/100ML; MG/100ML; MG/100ML
INJECTION, SOLUTION INTRAVENOUS CONTINUOUS
Status: DISCONTINUED | OUTPATIENT
Start: 2023-01-27 | End: 2023-02-01

## 2023-01-27 RX ORDER — ONDANSETRON 2 MG/ML
4 INJECTION INTRAMUSCULAR; INTRAVENOUS EVERY 6 HOURS PRN
Status: DISCONTINUED | OUTPATIENT
Start: 2023-01-27 | End: 2023-02-01 | Stop reason: HOSPADM

## 2023-01-27 RX ORDER — OXYCODONE HYDROCHLORIDE 5 MG/1
5 TABLET ORAL EVERY 6 HOURS PRN
Status: DISCONTINUED | OUTPATIENT
Start: 2023-01-27 | End: 2023-02-01

## 2023-01-27 RX ORDER — SODIUM CHLORIDE 0.9 % (FLUSH) 0.9 %
5-40 SYRINGE (ML) INJECTION PRN
Status: DISCONTINUED | OUTPATIENT
Start: 2023-01-27 | End: 2023-02-01 | Stop reason: HOSPADM

## 2023-01-27 RX ORDER — MAGNESIUM SULFATE IN WATER 40 MG/ML
2000 INJECTION, SOLUTION INTRAVENOUS ONCE
Status: COMPLETED | OUTPATIENT
Start: 2023-01-27 | End: 2023-01-27

## 2023-01-27 RX ORDER — ONDANSETRON 4 MG/1
4 TABLET, ORALLY DISINTEGRATING ORAL EVERY 8 HOURS PRN
Status: DISCONTINUED | OUTPATIENT
Start: 2023-01-27 | End: 2023-02-01 | Stop reason: HOSPADM

## 2023-01-27 RX ORDER — SODIUM CHLORIDE 9 MG/ML
INJECTION, SOLUTION INTRAVENOUS PRN
Status: DISCONTINUED | OUTPATIENT
Start: 2023-01-27 | End: 2023-02-01 | Stop reason: HOSPADM

## 2023-01-27 RX ORDER — 0.9 % SODIUM CHLORIDE 0.9 %
1000 INTRAVENOUS SOLUTION INTRAVENOUS ONCE
Status: COMPLETED | OUTPATIENT
Start: 2023-01-27 | End: 2023-01-27

## 2023-01-27 RX ORDER — SODIUM CHLORIDE 0.9 % (FLUSH) 0.9 %
5-40 SYRINGE (ML) INJECTION EVERY 12 HOURS SCHEDULED
Status: DISCONTINUED | OUTPATIENT
Start: 2023-01-27 | End: 2023-02-01 | Stop reason: HOSPADM

## 2023-01-27 RX ORDER — IBUPROFEN 400 MG/1
400 TABLET ORAL EVERY 6 HOURS
Status: DISCONTINUED | OUTPATIENT
Start: 2023-01-27 | End: 2023-02-01 | Stop reason: HOSPADM

## 2023-01-27 RX ADMIN — MORPHINE SULFATE 2 MG: 4 INJECTION INTRAVENOUS at 13:41

## 2023-01-27 RX ADMIN — IBUPROFEN 400 MG: 400 TABLET, FILM COATED ORAL at 17:42

## 2023-01-27 RX ADMIN — ACETAMINOPHEN 1000 MG: 500 TABLET ORAL at 17:42

## 2023-01-27 RX ADMIN — PIPERACILLIN AND TAZOBACTAM 4500 MG: 4; .5 INJECTION, POWDER, FOR SOLUTION INTRAVENOUS at 16:03

## 2023-01-27 RX ADMIN — IBUPROFEN 400 MG: 400 TABLET, FILM COATED ORAL at 22:07

## 2023-01-27 RX ADMIN — POTASSIUM BICARBONATE 40 MEQ: 782 TABLET, EFFERVESCENT ORAL at 14:30

## 2023-01-27 RX ADMIN — PIPERACILLIN AND TAZOBACTAM 3375 MG: 3; .375 INJECTION, POWDER, FOR SOLUTION INTRAVENOUS at 22:02

## 2023-01-27 RX ADMIN — MAGNESIUM SULFATE HEPTAHYDRATE 2000 MG: 40 INJECTION, SOLUTION INTRAVENOUS at 14:29

## 2023-01-27 RX ADMIN — SODIUM CHLORIDE 1000 ML: 9 INJECTION, SOLUTION INTRAVENOUS at 13:41

## 2023-01-27 RX ADMIN — ENOXAPARIN SODIUM 40 MG: 100 INJECTION SUBCUTANEOUS at 18:27

## 2023-01-27 RX ADMIN — IOPAMIDOL 75 ML: 755 INJECTION, SOLUTION INTRAVENOUS at 14:09

## 2023-01-27 RX ADMIN — SODIUM CHLORIDE, POTASSIUM CHLORIDE, SODIUM LACTATE AND CALCIUM CHLORIDE: 600; 310; 30; 20 INJECTION, SOLUTION INTRAVENOUS at 17:43

## 2023-01-27 RX ADMIN — SODIUM CHLORIDE, PRESERVATIVE FREE 10 ML: 5 INJECTION INTRAVENOUS at 22:07

## 2023-01-27 ASSESSMENT — ENCOUNTER SYMPTOMS
SORE THROAT: 0
CHEST TIGHTNESS: 0
BLOOD IN STOOL: 0
NAUSEA: 0
RHINORRHEA: 0
ABDOMINAL PAIN: 1
COUGH: 0
CONSTIPATION: 0
VOMITING: 0
DIARRHEA: 1
WHEEZING: 0
SHORTNESS OF BREATH: 0

## 2023-01-27 ASSESSMENT — PAIN SCALES - GENERAL
PAINLEVEL_OUTOF10: 7
PAINLEVEL_OUTOF10: 9

## 2023-01-27 ASSESSMENT — PAIN - FUNCTIONAL ASSESSMENT: PAIN_FUNCTIONAL_ASSESSMENT: 0-10

## 2023-01-27 ASSESSMENT — PAIN DESCRIPTION - DESCRIPTORS: DESCRIPTORS: ACHING

## 2023-01-27 ASSESSMENT — PAIN DESCRIPTION - LOCATION: LOCATION: ABDOMEN

## 2023-01-27 NOTE — CONSULTS
TR[facility]MA H&P/CONSULT    PATIENT NAME: Usha Duong  YOB: 1950  MEDICAL RECORD NO. [id number]   DATE: 1/27/2023  PRIMARY CARE PHYSICIAN: Donta Fischer MD  PATIENT EVALUATED AT THE REQUEST OF DR.: Ash Mcnamara MD    ACTIVATION   []Trauma Alert     [] Trauma Priority     [x]Trauma Consult. Patient Active Problem List   Diagnosis    Coronary artery disease s/p stent in 2014 by Dr. Selene Concepcion at [facility]    Mixed hyperlipidemia    Essential hypertension    Priapism       IMPRESSION AND PLAN:       Diagnosis: Sigmoid Diverticulitis    -CT intramural abscess/phlegmon versus extraluminal collection indicating contained perforation   -IV fluid   -Zosyn for antibiotic   -NPO   -Monitor WBC       Plan:     CONSULT SERVICES    [] Neurosurgery     [] Orthopedic Surgery    [] Cardiothoracic     [] Facial Trauma    [] Plastic Surgery (Burn)    [] Pediatric Surgery     [] Internal Medicine    [] Pulmonary Medicine    [] Geriatrics    [] Other:        HISTORY:     Chief Complaint:  Abdominal pain, diarrhea    GENERAL DATA  Patient information was obtained from patient. History/Exam limitations: none. Transport mode:   []Ambulance      [] Helicopter     [x]Car       [] Other    HISTORY:     Usha Duong is a male with history of cardiac stent on 81mg aspirin, hypertension that presented to the Emergency Department for complaint of abdominal pain and diarrhea. Abdominal pain started 3 days ago, and is intermittent, relieved with bowel movements. Patient was experiencing constipation since last week, so took MiraLAX at home. After taking the MiraLAX, he has been experiencing multiple episodes of diarrhea each day. Patient states that there is sometimes a yellow mucous in the stool and severe abdominal pain this morning.  He states that he has never had episodes like this in the past. Patient denies blood in the stool, chest pain, shortness of breath, nausea, vomiting, fever, chills, or weight loss. Recent colonoscopy couple years ago without polyps. He denies any previous abdominal surgeries. MEDICATIONS:   []  None     []  Information not available due to exam limitations documented above    Prior to Admission medications    Medication Sig Start Date End Date Taking? Authorizing Provider   lisinopril (PRINIVIL;ZESTRIL) 10 MG tablet take 1 tablet by mouth every morning 1/9/23   Poly Ellis MD   carvedilol (COREG) 6.25 MG tablet take 1 tablet by mouth twice a day with meals 12/16/22   Poly Ellis MD   aspirin (ASPIRIN LOW DOSE) 81 MG EC tablet Take 1 tablet by mouth daily 8/23/22   Linh Thapa MD   folic acid (FOLVITE) 1 MG tablet Take 1 tablet by mouth daily  Patient not taking: Reported on 1/27/2023 8/23/22   Linh Thapa MD   vitamin B-1 (THIAMINE) 100 MG tablet Take 1 tablet by mouth daily 8/23/22   Linh Thapa MD   vitamin B-12 (CYANOCOBALAMIN) 100 MCG tablet Take 1 tablet by mouth daily  Patient not taking: Reported on 1/27/2023 8/23/22 8/23/23  Linh Thapa MD   nitroGLYCERIN (NITROSTAT) 0.4 MG SL tablet One q 5 min prn chest pain, for up to three doses. Seek medical attention after 3 doses. Patient not taking: Reported on 1/27/2023 8/23/22   Linh Thapa MD   rosuvastatin (CRESTOR) 20 MG tablet Take 1 tablet by mouth nightly 3/15/22   Poly Ellis MD       ALLERGIES:   []  None    []   Information not available due to exam limitations documented above   Patient has no known allergies. PAST MEDICAL/SURGICAL HISTORY: []  None   []   Information not available due to exam limitations documented above    has a past medical history of CAD (coronary artery disease), Episode of syncope, Hyperlipidemia, and Hypertension. has a past surgical history that includes Ankle surgery; Percutaneous Transluminal Coronary Angio (2014); Colonoscopy; Coronary angioplasty with stent (2014); Penis surgery (08/03/2020); and Penile prosthesis placement (N/A, 8/3/2020).     FAMILY HISTORY   []   Information not available due to exam limitations documented above  family history is not on file. SOCIAL HISTORY  []   Information not available due to exam limitations documented above   reports that he quit smoking about 28 years ago. He has a 5.00 pack-year smoking history. He has never used smokeless tobacco.   reports current alcohol use. reports no history of drug use. Review of Systems:    Review of Systems   Constitutional:  Negative for chills, diaphoresis, fever and unexpected weight change. Respiratory:  Negative for shortness of breath. Cardiovascular:  Negative for chest pain. Gastrointestinal:  Positive for abdominal pain and diarrhea. Negative for blood in stool, constipation, nausea and vomiting. Genitourinary:  Negative for difficulty urinating, dysuria, frequency and hematuria. Neurological:  Negative for dizziness and weakness. PHYSICAL EXAMINATION:     VITAL SIGNS:   Vitals:    01/27/23 1330   BP: 115/76   Pulse: 94   Resp:    Temp:    SpO2: 97%       Physical Exam  Constitutional:       General: He is not in acute distress. HENT:      Head: Normocephalic and atraumatic. Nose: Nose normal.   Eyes:      Pupils: Pupils are equal, round, and reactive to light. Cardiovascular:      Rate and Rhythm: Normal rate and regular rhythm. Pulmonary:      Effort: Pulmonary effort is normal.      Breath sounds: Normal breath sounds. Abdominal:      Palpations: Abdomen is soft. Tenderness: There is abdominal tenderness. There is no guarding. Hernia: No hernia is present. Comments: Lower abdomen is tender to palpate, especially LLQ. Soft and non distended. No rebounce or guarding    Genitourinary:     Penis: Normal.    Musculoskeletal:         General: Normal range of motion. Skin:     General: Skin is warm. Capillary Refill: Capillary refill takes less than 2 seconds. Neurological:      General: No focal deficit present. Mental Status: He is alert. Psychiatric:         Behavior: Behavior normal.          RADIOLOGY  CT ABDOMEN PELVIS W IV CONTRAST Additional Contrast? None   Final Result   Acute proximal sigmoid diverticulitis. Tiny 2.4 cm low-attenuation   collection could reflect intramural abscess/phlegmon versus extraluminal   collection indicating contained perforation. No drainable abscess at this   time. Mild surrounding fat stranding along the bladder could be reactive from   adjacent acute diverticulitis. Urinalysis/sampling could be performed as   clinically indicated. Cholelithiasis.                LABS  Labs Reviewed   BASIC METABOLIC PANEL - Abnormal; Notable for the following components:       Result Value    Glucose 114 (*)     Creatinine 0.65 (*)     Potassium 3.3 (*)     All other components within normal limits   CBC WITH AUTO DIFFERENTIAL - Abnormal; Notable for the following components:    WBC 14.7 (*)     RBC 3.78 (*)     Hemoglobin 12.0 (*)     Hematocrit 35.0 (*)     Seg Neutrophils 76 (*)     Lymphocytes 11 (*)     Immature Granulocytes 1 (*)     Segs Absolute 11.22 (*)     Absolute Mono # 1.39 (*)     All other components within normal limits   HEPATIC FUNCTION PANEL - Abnormal; Notable for the following components:    Albumin 3.2 (*)     Albumin/Globulin Ratio 0.8 (*)     All other components within normal limits   MAGNESIUM - Abnormal; Notable for the following components:    Magnesium 1.4 (*)     All other components within normal limits   URINALYSIS WITH MICROSCOPIC - Abnormal; Notable for the following components:    Ketones, Urine TRACE (*)     Protein, UA TRACE (*)     All other components within normal limits   CULTURE, BLOOD 1   CULTURE, BLOOD 1   LACTIC ACID   LIPASE   PROTIME-INR     Electronically signed by Rosita Goodman DO on 1/27/2023 at 4:31 PM    Earline Lopes  1/27/23, 4:06 PM

## 2023-01-27 NOTE — ED PROVIDER NOTES
[facility] ED  Emergency Department Encounter  Emergency Medicine Resident     Pt Name:Carlos Edward  MRN: [id number]  [place] 1950  Date of evaluation: 1/27/23  PCP:  Yuliana Horn MD  Note Started: 1:12 PM EST      CHIEF COMPLAINT       Chief Complaint   Patient presents with    Abdominal Pain     X3 days    Diarrhea     Yellow liquid        HISTORY OF PRESENT ILLNESS  (Location/Symptom, Timing/Onset, Context/Setting, Quality, Duration, Modifying Factors, Severity.)      Ya Rodriguez is a 74 y.o. male who presents with complaint of abdominal pain and diarrhea. Onset of symptoms approximately 3 days ago. Patient states that he was constipated and took some MiraLAX 3 days ago. He did have a bowel movement after that but since then he has had multiple watery bowel movements every day for the last 3 days. He also reports intermittently having some yellowish mucus mixed in with his stool. There is abdominal pain in the left lower quadrant and suprapubic region. He is denying associated fevers, nausea, vomiting, dysuria or genital discomfort. He does state that he intermittently has some chills. He has no surgical history in his abdomen. Non-smoker. No recreational drugs. Social alcohol user. PAST MEDICAL / SURGICAL / SOCIAL / FAMILY HISTORY      has a past medical history of CAD (coronary artery disease), Episode of syncope, Hyperlipidemia, and Hypertension. has a past surgical history that includes Ankle surgery; Percutaneous Transluminal Coronary Angio (2014); Colonoscopy; Coronary angioplasty with stent (2014); Penis surgery (08/03/2020); and Penile prosthesis placement (N/A, 8/3/2020).       Social History     Socioeconomic History    Marital status: Single     Spouse name: Not on file    Number of children: Not on file    Years of education: Not on file    Highest education level: Not on file   Occupational History    Not on file   Tobacco Use    Smoking status: Former     Packs/day: 0.25     Years: 20.00     Pack years: 5.00     Types: Cigarettes     Quit date: 1995     Years since quittin.0    Smokeless tobacco: Never   Substance and Sexual Activity    Alcohol use: Yes     Comment: 2x a week     Drug use: No    Sexual activity: Not on file   Other Topics Concern    Not on file   Social History Narrative    Not on file     Social Determinants of Health     Financial Resource Strain: Low Risk     Difficulty of Paying Living Expenses: Not hard at all   Food Insecurity: No Food Insecurity    Worried About Running Out of Food in the Last Year: Never true    Ran Out of Food in the Last Year: Never true   Transportation Needs: Not on file   Physical Activity: Not on file   Stress: Not on file   Social Connections: Not on file   Intimate Partner Violence: Not on file   Housing Stability: Not on file       History reviewed. No pertinent family history. Allergies:  Patient has no known allergies. Home Medications:  Prior to Admission medications    Medication Sig Start Date End Date Taking? Authorizing Provider   lisinopril (PRINIVIL;ZESTRIL) 10 MG tablet take 1 tablet by mouth every morning 23   Jordyn Coon MD   carvedilol (COREG) 6.25 MG tablet take 1 tablet by mouth twice a day with meals 22   Jordyn Coon MD   aspirin (ASPIRIN LOW DOSE) 81 MG EC tablet Take 1 tablet by mouth daily 22   Tara Razo MD   folic acid (FOLVITE) 1 MG tablet Take 1 tablet by mouth daily  Patient not taking: Reported on 2023   Tara Razo MD   vitamin B-1 (THIAMINE) 100 MG tablet Take 1 tablet by mouth daily 22   Tara Razo MD   vitamin B-12 (CYANOCOBALAMIN) 100 MCG tablet Take 1 tablet by mouth daily  Patient not taking: Reported on 2023  Tara Razo MD   nitroGLYCERIN (NITROSTAT) 0.4 MG SL tablet One q 5 min prn chest pain, for up to three doses. Seek medical attention after 3 doses.   Patient not taking: Reported on 1/27/2023 8/23/22   Augustin Che MD   rosuvastatin (CRESTOR) 20 MG tablet Take 1 tablet by mouth nightly 3/15/22   Mariana Kelsey MD         REVIEW OF SYSTEMS       Review of Systems   Constitutional:  Positive for chills. Negative for fatigue and fever. HENT:  Negative for dental problem, rhinorrhea and sore throat. Respiratory:  Negative for cough, chest tightness, shortness of breath and wheezing. Cardiovascular:  Negative for chest pain. Gastrointestinal:  Positive for abdominal pain and diarrhea. Negative for constipation, nausea and vomiting. Genitourinary:  Negative for dysuria and hematuria. PHYSICAL EXAM      INITIAL VITALS:   /76   Pulse 94   Temp 97.5 °F (36.4 °C) (Oral)   Resp 16   Wt 160 lb (72.6 kg)   SpO2 97%   BMI 20.54 kg/m²     Physical Exam  Vitals reviewed. Constitutional:       General: He is not in acute distress. Appearance: Normal appearance. He is well-developed. He is not ill-appearing. HENT:      Head: Normocephalic. Cardiovascular:      Rate and Rhythm: Normal rate and regular rhythm. Pulses: Normal pulses. Heart sounds: Normal heart sounds. No murmur heard. Pulmonary:      Effort: Pulmonary effort is normal. No respiratory distress. Breath sounds: Normal breath sounds. No wheezing or rhonchi. Abdominal:      General: Abdomen is flat. Palpations: Abdomen is soft. Tenderness: There is abdominal tenderness. Comments: LLQ, suprapubic tenderness to palpation   Musculoskeletal:      Cervical back: Normal range of motion. No rigidity. Skin:     General: Skin is warm and dry. Neurological:      General: No focal deficit present. Mental Status: He is alert. DDX/DIAGNOSTIC RESULTS / EMERGENCY DEPARTMENT COURSE / MDM     Medical Decision Making  Amount and/or Complexity of Data Reviewed  Labs: ordered. Radiology: ordered. Risk  Prescription drug management.         EKG      All EKG's are interpreted by the Emergency Department Physician who either signs or Co-signs this chart in the absence of a cardiologist.    EMERGENCY DEPARTMENT COURSE:  Sent to the emergency department initially with heart rate 110, vital signs otherwise unremarkable. This normalized after being at rest.  We will obtain abdominal labs, CBC, CT abdomen pelvis. Reassess. 3:14 PM  White blood cell count 14.7. Lactic acid negative. Lipase negative. LFTs negative. Magnesium 1.6. Potassium 3.3. Will replete. CT is demonstrating proximal sigmoid diverticulitis. There is a 2.4 cm collection which could be an intramural abscess versus extraluminal collection indicating possible perforation. Consult placed with general surgery. Family is updated. 4:30PM  Patient has been assessed by general surgery and will be admitted under their care. PROCEDURES:      CONSULTS:  IP CONSULT TO GENERAL SURGERY    CRITICAL CARE:  There was significant risk of life threatening deterioration of patient's condition requiring my direct management. Critical care time 0 minutes, excluding any documented procedures. FINAL IMPRESSION      1. Diverticulitis          DISPOSITION / PLAN     DISPOSITION Admitted 01/27/2023 04:25:20 PM      PATIENT REFERRED TO:  No follow-up provider specified.     DISCHARGE MEDICATIONS:  New Prescriptions    No medications on file       Vincenzo Morrow MD  Emergency Medicine Resident    (Please note that portions of thisnote were completed with a voice recognition program.  Efforts were made to edit the dictations but occasionally words are mis-transcribed.)       Vincenzo Morrow MD  Resident  01/27/23 3024

## 2023-01-27 NOTE — ED NOTES
The following labs were labeled with appropriate pt sticker and tubed to lab:     [x] Blue     [x] Lavender   [] on ice  [x] Green/yellow  [x] Green/black [] on ice  [] Cathren Aland  [] on ice  [] Yellow  [] Red  [] Type/ Screen  [] ABG  [] VBG    [] COVID-19 swab    [] Rapid  [] PCR  [] Flu swab  [] Peds Viral Panel     [] Urine Sample  [] Fecal Sample  [] Pelvic Cultures  [] Blood Cultures  [] X 2  [] STREP Cultures         Rachna Reyna RN  01/27/23 5726

## 2023-01-27 NOTE — ED PROVIDER NOTES
[facility] [facility]     Emergency Department     Faculty Attestation        I performed a history and physical examination of the patient and discussed management with the resident. I reviewed the residents note and agree with the documented findings and plan of care. Any areas of disagreement are noted on the chart. I was personally present for the key portions of any procedures. I have documented in the chart those procedures where I was not present during the key portions. I have reviewed the emergency nurses triage note. I agree with the chief complaint, past medical history, past surgical history, allergies, medications, social and family history as documented unless otherwise noted below. For Physician Assistant/ Nurse Practitioner cases/documentation I have personally evaluated this patient and have completed at least one if not all key elements of the E/M (history, physical exam, and MDM). Additional findings are as noted. Vital Signs: BP: 132/85  Heart Rate: (!) 110  Resp: 16  Temp: 97.5 °F (36.4 °C) SpO2: 97 %  PCP:  Baltazar Mcmahan MD    Pertinent Comments:     Patient is a 75-year-old male with history of hypertension as well as CAD who presents with 5 days ago beginning to have constipation and possibly subtle left lower quadrant discomfort and took MiraLAX with improvement. Then began having diarrhea and further/worsening left lower quadrant and suprapubic pain. Notices some mucus involved as well but no blood. He denies any chest pain or shortness of breath and no fever/chills. There is been no vomiting associated either. Physical examination: Clear to station bilateral with midline trachea heart regular rate and rhythm. Abdomen is soft but tender in the suprapubic as well as left lower quadrant moderately but no peritoneal signs. No obvious pulsatile masses palpated either.    Bilateral lower extremities with intact DP pulses and equal bilaterally as well as no obvious calf swelling or pain. Neurologically grossly intact with no focal deficit. Assessment/plan: Patient with worsening left lower quadrant and suprapubic pain associated with diarrhea as well as some mucus involved. Possible diverticulitis versus colitis versus other intra-abdominal process. Will attempt symptomatic control as well as CBC, BMP, LFTs, lipase, and lactate. CT abdomen/pelvis as well. Reevaluate after      Critical Care  None      (Please note that portions of this note were completed with a voice recognition program. Efforts were made to edit the dictations but occasionally words are mis-transcribed.  Whenever words are used in this note in any gender, they shall be construed as though they were used in the gender appropriate to the circumstances; and whenever words are used in this note in the singular or plural form, they shall be construed as though they were used in the form appropriate to the circumstances.)    MD Lore Read  Attending Emergency Medicine Physician            Penny Izaguirre MD  01/27/23 2298 Isaias Dee MD  01/27/23 [id number]

## 2023-01-27 NOTE — ED NOTES
Pt comes to ED with c/o abdominal pain and diarrhea. Pt states having abdominal pain beginning of week, being constipated, taking Myralax three days ago and started having watery diarrhea after med intake. Pt denies chest pain and SOB. Pt a/o x4, resting on stretcher, attached to monitor, RR even and non labored, call light within reach.       Carla Hardy RN  01/27/23 [id number]

## 2023-01-28 LAB
ABSOLUTE EOS #: 0.45 K/UL (ref 0–0.44)
ABSOLUTE IMMATURE GRANULOCYTE: 0.05 K/UL (ref 0–0.3)
ABSOLUTE LYMPH #: 1.24 K/UL (ref 1.1–3.7)
ABSOLUTE MONO #: 1.02 K/UL (ref 0.1–1.2)
ANION GAP SERPL CALCULATED.3IONS-SCNC: 10 MMOL/L (ref 9–17)
BASOPHILS # BLD: 0 % (ref 0–2)
BASOPHILS ABSOLUTE: 0.03 K/UL (ref 0–0.2)
BUN BLDV-MCNC: 8 MG/DL (ref 8–23)
CALCIUM SERPL-MCNC: 7.9 MG/DL (ref 8.6–10.4)
CHLORIDE BLD-SCNC: 105 MMOL/L (ref 98–107)
CO2: 25 MMOL/L (ref 20–31)
CREAT SERPL-MCNC: 0.71 MG/DL (ref 0.7–1.2)
EOSINOPHILS RELATIVE PERCENT: 5 % (ref 1–4)
GFR SERPL CREATININE-BSD FRML MDRD: >60 ML/MIN/1.73M2
GLUCOSE BLD-MCNC: 92 MG/DL (ref 70–99)
HCT VFR BLD CALC: 29.6 % (ref 40.7–50.3)
HEMOGLOBIN: 9.8 G/DL (ref 13–17)
IMMATURE GRANULOCYTES: 1 %
LYMPHOCYTES # BLD: 13 % (ref 24–43)
MAGNESIUM: 1.8 MG/DL (ref 1.6–2.6)
MCH RBC QN AUTO: 31.5 PG (ref 25.2–33.5)
MCHC RBC AUTO-ENTMCNC: 33.1 G/DL (ref 28.4–34.8)
MCV RBC AUTO: 95.2 FL (ref 82.6–102.9)
MONOCYTES # BLD: 10 % (ref 3–12)
NRBC AUTOMATED: 0 PER 100 WBC
PDW BLD-RTO: 12.6 % (ref 11.8–14.4)
PLATELET # BLD: 266 K/UL (ref 138–453)
PMV BLD AUTO: 9.6 FL (ref 8.1–13.5)
POTASSIUM SERPL-SCNC: 3.3 MMOL/L (ref 3.7–5.3)
RBC # BLD: 3.11 M/UL (ref 4.21–5.77)
SEG NEUTROPHILS: 72 % (ref 36–65)
SEGMENTED NEUTROPHILS ABSOLUTE COUNT: 7.06 K/UL (ref 1.5–8.1)
SODIUM BLD-SCNC: 140 MMOL/L (ref 135–144)
WBC # BLD: 9.9 K/UL (ref 3.5–11.3)

## 2023-01-28 PROCEDURE — 36415 COLL VENOUS BLD VENIPUNCTURE: CPT

## 2023-01-28 PROCEDURE — 6360000002 HC RX W HCPCS

## 2023-01-28 PROCEDURE — 6360000002 HC RX W HCPCS: Performed by: STUDENT IN AN ORGANIZED HEALTH CARE EDUCATION/TRAINING PROGRAM

## 2023-01-28 PROCEDURE — 1200000000 HC SEMI PRIVATE

## 2023-01-28 PROCEDURE — 2580000003 HC RX 258

## 2023-01-28 PROCEDURE — 6370000000 HC RX 637 (ALT 250 FOR IP)

## 2023-01-28 PROCEDURE — 80048 BASIC METABOLIC PNL TOTAL CA: CPT

## 2023-01-28 PROCEDURE — 83735 ASSAY OF MAGNESIUM: CPT

## 2023-01-28 PROCEDURE — 2580000003 HC RX 258: Performed by: STUDENT IN AN ORGANIZED HEALTH CARE EDUCATION/TRAINING PROGRAM

## 2023-01-28 PROCEDURE — 85025 COMPLETE CBC W/AUTO DIFF WBC: CPT

## 2023-01-28 RX ADMIN — IBUPROFEN 400 MG: 400 TABLET, FILM COATED ORAL at 04:28

## 2023-01-28 RX ADMIN — ACETAMINOPHEN 1000 MG: 500 TABLET ORAL at 00:21

## 2023-01-28 RX ADMIN — IBUPROFEN 400 MG: 400 TABLET, FILM COATED ORAL at 22:13

## 2023-01-28 RX ADMIN — ACETAMINOPHEN 1000 MG: 500 TABLET ORAL at 09:00

## 2023-01-28 RX ADMIN — IBUPROFEN 400 MG: 400 TABLET, FILM COATED ORAL at 15:04

## 2023-01-28 RX ADMIN — PIPERACILLIN AND TAZOBACTAM 3375 MG: 3; .375 INJECTION, POWDER, FOR SOLUTION INTRAVENOUS at 04:19

## 2023-01-28 RX ADMIN — SODIUM CHLORIDE, POTASSIUM CHLORIDE, SODIUM LACTATE AND CALCIUM CHLORIDE: 600; 310; 30; 20 INJECTION, SOLUTION INTRAVENOUS at 21:04

## 2023-01-28 RX ADMIN — PIPERACILLIN AND TAZOBACTAM 3375 MG: 3; .375 INJECTION, POWDER, FOR SOLUTION INTRAVENOUS at 11:58

## 2023-01-28 RX ADMIN — ENOXAPARIN SODIUM 40 MG: 100 INJECTION SUBCUTANEOUS at 09:01

## 2023-01-28 RX ADMIN — POTASSIUM BICARBONATE 60 MEQ: 782 TABLET, EFFERVESCENT ORAL at 09:00

## 2023-01-28 RX ADMIN — SODIUM CHLORIDE, PRESERVATIVE FREE 10 ML: 5 INJECTION INTRAVENOUS at 21:05

## 2023-01-28 RX ADMIN — ACETAMINOPHEN 1000 MG: 500 TABLET ORAL at 15:04

## 2023-01-28 RX ADMIN — PIPERACILLIN AND TAZOBACTAM 3375 MG: 3; .375 INJECTION, POWDER, FOR SOLUTION INTRAVENOUS at 21:04

## 2023-01-28 RX ADMIN — IBUPROFEN 400 MG: 400 TABLET, FILM COATED ORAL at 09:01

## 2023-01-28 RX ADMIN — SODIUM CHLORIDE, POTASSIUM CHLORIDE, SODIUM LACTATE AND CALCIUM CHLORIDE: 600; 310; 30; 20 INJECTION, SOLUTION INTRAVENOUS at 04:19

## 2023-01-28 ASSESSMENT — PAIN DESCRIPTION - LOCATION
LOCATION: ABDOMEN

## 2023-01-28 ASSESSMENT — PAIN SCALES - GENERAL
PAINLEVEL_OUTOF10: 7
PAINLEVEL_OUTOF10: 5
PAINLEVEL_OUTOF10: 5
PAINLEVEL_OUTOF10: 7

## 2023-01-28 ASSESSMENT — PAIN DESCRIPTION - DESCRIPTORS
DESCRIPTORS: ACHING

## 2023-01-28 NOTE — PROGRESS NOTES
PROGRESS NOTE          PATIENT NAME: Wyatt Cantu  MEDICAL RECORD NO. [id number]  DATE: 2023  SURGEON: Alycia Bradley  PRIMARY CARE PHYSICIAN: Abraham Solis MD    HD: # 1    ASSESSMENT    Patient Active Problem List   Diagnosis    Coronary artery disease s/p stent in 2014 by Dr. Nabila Son at [facility]    Mixed hyperlipidemia    Essential hypertension    Priapism    Diverticulitis       MEDICAL DECISION MAKING AND PLAN    Sigmoid Diverticulitis   CT intramural abscess/phlegmon versus extraluminal collection indicating contained perforation  IVF, Zosyn, bowel rest, CLD  Patient to receive 10 total days of antibiotic therapy  WBC decrease to 9.9, abdominal exam improving  Hypokalemia  K+ 3.3, replace with effer k this AM      SUBJECTIVE    Wyatt Cantu is has improved since yesterday. He states that his abdominal pain is significantly improved, rates it a 4/10 currently. He states he is passing gas, no BM. Tolerating CLD, urinating. OBJECTIVE  VITALS: Temp: Temp: 98.2 °F (36.8 °C)Temp  Av °F (36.7 °C)  Min: 97.5 °F (36.4 °C)  Max: 98.2 °F (52.2 °C) BP Systolic (28CSE), VQU:985 , Min:112 , QZM:970   Diastolic (92FER), FEL:44, Min:65, Max:94   Pulse Pulse  Av.8  Min: 83  Max: 110 Resp Resp  Av  Min: 16  Max: 16 Pulse ox SpO2  Av.9 %  Min: 94 %  Max: 98 %  GENERAL: alert, no distress  NEURO: aox4, moving all 4 extremities spontaneously  HEENT: normocephalic, atraumatic  LUNGS: clear to ausculation, without wheezes, rales or rhonci  HEART: normal rate and regular rhythm  ABDOMEN: soft, non-distended, mild abdominal tenderness in LLQ no guarding or peritoneal signs present  EXTREMITY: no cyanosis, clubbing or edema    I/O last 3 completed shifts:  In: -   Out: 550 [Urine:550]    Drain/tube output:   In: -   Out: 550 [Urine:550]    LAB:  CBC:   Recent Labs     23  1329 23  0628   WBC 14.7* 9.9   HGB 12.0* 9.8*   HCT 35.0* 29.6*   MCV 92.6 95.2    266     BMP: Recent Labs     01/27/23  1329 01/28/23  0628    140   K 3.3* 3.3*    105   CO2 25 25   BUN 9 8   CREATININE 0.65* 0.71   GLUCOSE 114* 92     COAGS:   Recent Labs     01/27/23  1329   PROT 7.3   INR 1.0       RADIOLOGY:        Eliz Gutierrez MD  1/28/23, 7:42 AM        Attending Note    2 Days IV antibiotics, then change to orals. If tolerated, then will discharge to home on 10 day course.,  I have reviewed the above TECSS note(s) and I either performed the key elements of the medical history and physical exam or was present with the resident when the key elements of the medical history and physical exam were performed. I have discussed the findings, established the care plan and recommendations with Resident, STAR RN.     Vane Stephenson MD  1/28/2023  1:43 PM

## 2023-01-28 NOTE — PLAN OF CARE
Problem: Pain  Goal: Verbalizes/displays adequate comfort level or baseline comfort level  1/28/2023 1805 by Scott Zepeda RN  Outcome: Progressing  1/28/2023 0458 by Mark Ferrera RN  Outcome: Progressing     Problem: Safety - Adult  Goal: Free from fall injury  1/28/2023 1805 by Scott Zepeda RN  Outcome: Progressing  1/28/2023 0458 by Mark Ferrera RN  Outcome: Progressing     Problem: ABCDS Injury Assessment  Goal: Absence of physical injury  1/28/2023 1805 by Soctt Zepeda RN  Outcome: Progressing  1/28/2023 0458 by Mark Ferrera RN  Outcome: Progressing

## 2023-01-28 NOTE — PROGRESS NOTES
[facility]  Speech Language Pathology    Date: 1/28/2023  Patient Name: Sherry Sepulveda  YOB: 1950   AGE: 74 y.o. MRN: [id number]        Patient Not Available for Speech Therapy     Due to:  [] Testing  [] Hemodialysis  [] Cancelled by RN  [] Surgery   [] Intubation/Sedation/Pain Medication  [] Medical instability  [x] Other:  Pt is a 75 yo man diagnosed with sigmoid diverticulitis with contained perforation. Pt is on a clear liquid diet which is typical for acute diverticulitis treatment. Asking for clarification on Speech Therapy orders    Next scheduled treatment: pending clarification of order    Completed by: Sadie Campo, SLP, M. Ed.  ROBER-SLP

## 2023-01-28 NOTE — DISCHARGE INSTRUCTIONS
What to do after you leave the hospital:  General questions or concerns may be called to the trauma nurse line at [phone] and please leave a message. Your doctor will want to closely monitor you. Be sure to go to all of your appointments.

## 2023-01-28 NOTE — CARE COORDINATION
Case Management Assessment  Initial Evaluation    Date/Time of Evaluation: 1/28/2023 11:30 AM  Assessment Completed by: Goldy Fernandes RN    If patient is discharged prior to next notation, then this note serves as note for discharge by case management. Patient Name: Godwin Worthy                   YOB: 1950  Diagnosis: Diverticulitis [K57.92]                   Date / Time: 1/27/2023 12:59 PM    Patient Admission Status: Inpatient   Readmission Risk (Low < 19, Mod (19-27), High > 27): Readmission Risk Score: 8.9    Current PCP: Rachel Valenzuela MD  PCP verified by CM? Yes    Chart Reviewed: Yes      History Provided by: Patient  Patient Orientation: Alert and Oriented, Person, Situation, Place    Patient Cognition: Alert    Hospitalization in the last 30 days (Readmission):  No    If yes, Readmission Assessment in  Navigator will be completed. Advance Directives:      Code Status: Full Code   Patient's Primary Decision Maker is: Legal Next of Kin      Discharge Planning:    Patient lives with: Alone Type of Home: House  Primary Care Giver: Self  Patient Support Systems include: Family Members   Current Financial resources: Medicare, Medicaid  Current community resources:    Current services prior to admission: None            Current DME:              Type of Home Care services:  None    ADLS  Prior functional level: Independent in ADLs/IADLs  Current functional level: Independent in ADLs/IADLs    PT AM-PAC:   /24  OT AM-PAC:   /24    Family can provide assistance at DC: Yes  Would you like Case Management to discuss the discharge plan with any other family members/significant others, and if so, who?  No  Plans to Return to Present Housing: Yes  Other Identified Issues/Barriers to RETURNING to current housing: na  Potential Assistance needed at discharge: N/A            Potential DME:    Patient expects to discharge to: [address] [place] [address] for transportation at discharge: Family    Financial    Payor: [employer] / Plan: [employer] / Product Type: *No Product type* /     Does insurance require precert for SNF: Yes    Potential assistance Purchasing Medications: No  Meds-to-Beds request:        [facility] [id number] Anneliese Steen, [address] [place], [place] [address]  [address] [id number]  Phone: [phone] Fax: [phone]      Notes:    Factors facilitating achievement of predicted outcomes: Family support, Cooperative, Pleasant, Sense of humor, and Good insight into deficits    Barriers to discharge: Medical complications    Additional Case Management Notes: met with patient to discuss transitional planning. Plan is to return home independently with family support. Patient's brother to provide ride home. Patient agreeable to plan     The Plan for Transition of Care is related to the following treatment goals of Diverticulitis [X18.61]    IF APPLICABLE: The Patient and/or patient representative Silvia Padilla and his family were provided with a choice of provider and agrees with the discharge plan. Freedom of choice list with basic dialogue that supports the patient's individualized plan of care/goals and shares the quality data associated with the providers was provided to: Patient   Patient Representative Name:       The Patient and/or Patient Representative Agree with the Discharge Plan?  Yes    Goldy Fernandes RN  Case Management Department  Ph: [phone] Fax: [email or web address]

## 2023-01-28 NOTE — PLAN OF CARE
Problem: Pain  Goal: Verbalizes/displays adequate comfort level or baseline comfort level  1/28/2023 0458 by Mike Woodson RN  Outcome: Progressing  1/27/2023 1852 by Sandra Frankel RN  Outcome: Progressing

## 2023-01-29 LAB
ABSOLUTE EOS #: 0.58 K/UL (ref 0–0.44)
ABSOLUTE IMMATURE GRANULOCYTE: 0.07 K/UL (ref 0–0.3)
ABSOLUTE LYMPH #: 1.99 K/UL (ref 1.1–3.7)
ABSOLUTE MONO #: 1.18 K/UL (ref 0.1–1.2)
ANION GAP SERPL CALCULATED.3IONS-SCNC: 8 MMOL/L (ref 9–17)
BASOPHILS # BLD: 0 % (ref 0–2)
BASOPHILS ABSOLUTE: 0.03 K/UL (ref 0–0.2)
BUN BLDV-MCNC: 6 MG/DL (ref 8–23)
CALCIUM SERPL-MCNC: 7.8 MG/DL (ref 8.6–10.4)
CHLORIDE BLD-SCNC: 105 MMOL/L (ref 98–107)
CO2: 25 MMOL/L (ref 20–31)
CREAT SERPL-MCNC: 0.64 MG/DL (ref 0.7–1.2)
EOSINOPHILS RELATIVE PERCENT: 4 % (ref 1–4)
GFR SERPL CREATININE-BSD FRML MDRD: >60 ML/MIN/1.73M2
GLUCOSE BLD-MCNC: 73 MG/DL (ref 70–99)
HCT VFR BLD CALC: 29 % (ref 40.7–50.3)
HEMOGLOBIN: 9.7 G/DL (ref 13–17)
IMMATURE GRANULOCYTES: 1 %
LYMPHOCYTES # BLD: 14 % (ref 24–43)
MCH RBC QN AUTO: 32 PG (ref 25.2–33.5)
MCHC RBC AUTO-ENTMCNC: 33.4 G/DL (ref 28.4–34.8)
MCV RBC AUTO: 95.7 FL (ref 82.6–102.9)
MONOCYTES # BLD: 8 % (ref 3–12)
NRBC AUTOMATED: 0 PER 100 WBC
PDW BLD-RTO: 12.7 % (ref 11.8–14.4)
PLATELET # BLD: 308 K/UL (ref 138–453)
PMV BLD AUTO: 9.9 FL (ref 8.1–13.5)
POTASSIUM SERPL-SCNC: 3.6 MMOL/L (ref 3.7–5.3)
RBC # BLD: 3.03 M/UL (ref 4.21–5.77)
SEG NEUTROPHILS: 73 % (ref 36–65)
SEGMENTED NEUTROPHILS ABSOLUTE COUNT: 10.22 K/UL (ref 1.5–8.1)
SODIUM BLD-SCNC: 138 MMOL/L (ref 135–144)
WBC # BLD: 14.1 K/UL (ref 3.5–11.3)

## 2023-01-29 PROCEDURE — 6360000002 HC RX W HCPCS: Performed by: STUDENT IN AN ORGANIZED HEALTH CARE EDUCATION/TRAINING PROGRAM

## 2023-01-29 PROCEDURE — 80048 BASIC METABOLIC PNL TOTAL CA: CPT

## 2023-01-29 PROCEDURE — 1200000000 HC SEMI PRIVATE

## 2023-01-29 PROCEDURE — 6370000000 HC RX 637 (ALT 250 FOR IP)

## 2023-01-29 PROCEDURE — 2580000003 HC RX 258: Performed by: STUDENT IN AN ORGANIZED HEALTH CARE EDUCATION/TRAINING PROGRAM

## 2023-01-29 PROCEDURE — 36415 COLL VENOUS BLD VENIPUNCTURE: CPT

## 2023-01-29 PROCEDURE — 85025 COMPLETE CBC W/AUTO DIFF WBC: CPT

## 2023-01-29 PROCEDURE — 2580000003 HC RX 258

## 2023-01-29 RX ORDER — POTASSIUM CHLORIDE 20 MEQ/1
40 TABLET, EXTENDED RELEASE ORAL ONCE
Status: COMPLETED | OUTPATIENT
Start: 2023-01-29 | End: 2023-01-29

## 2023-01-29 RX ADMIN — SODIUM CHLORIDE, POTASSIUM CHLORIDE, SODIUM LACTATE AND CALCIUM CHLORIDE: 600; 310; 30; 20 INJECTION, SOLUTION INTRAVENOUS at 20:35

## 2023-01-29 RX ADMIN — ACETAMINOPHEN 1000 MG: 500 TABLET ORAL at 00:19

## 2023-01-29 RX ADMIN — IBUPROFEN 400 MG: 400 TABLET, FILM COATED ORAL at 04:12

## 2023-01-29 RX ADMIN — ACETAMINOPHEN 1000 MG: 500 TABLET ORAL at 09:08

## 2023-01-29 RX ADMIN — PIPERACILLIN AND TAZOBACTAM 3375 MG: 3; .375 INJECTION, POWDER, FOR SOLUTION INTRAVENOUS at 04:13

## 2023-01-29 RX ADMIN — ACETAMINOPHEN 1000 MG: 500 TABLET ORAL at 16:29

## 2023-01-29 RX ADMIN — IBUPROFEN 400 MG: 400 TABLET, FILM COATED ORAL at 22:22

## 2023-01-29 RX ADMIN — IBUPROFEN 400 MG: 400 TABLET, FILM COATED ORAL at 09:08

## 2023-01-29 RX ADMIN — POTASSIUM CHLORIDE 40 MEQ: 1500 TABLET, EXTENDED RELEASE ORAL at 11:16

## 2023-01-29 RX ADMIN — SODIUM CHLORIDE, PRESERVATIVE FREE 10 ML: 5 INJECTION INTRAVENOUS at 21:54

## 2023-01-29 RX ADMIN — PIPERACILLIN AND TAZOBACTAM 3375 MG: 3; .375 INJECTION, POWDER, FOR SOLUTION INTRAVENOUS at 11:17

## 2023-01-29 RX ADMIN — PIPERACILLIN AND TAZOBACTAM 3375 MG: 3; .375 INJECTION, POWDER, FOR SOLUTION INTRAVENOUS at 21:53

## 2023-01-29 RX ADMIN — SODIUM CHLORIDE, PRESERVATIVE FREE 10 ML: 5 INJECTION INTRAVENOUS at 09:09

## 2023-01-29 RX ADMIN — SODIUM CHLORIDE, POTASSIUM CHLORIDE, SODIUM LACTATE AND CALCIUM CHLORIDE: 600; 310; 30; 20 INJECTION, SOLUTION INTRAVENOUS at 04:19

## 2023-01-29 ASSESSMENT — PAIN DESCRIPTION - ORIENTATION: ORIENTATION: RIGHT

## 2023-01-29 ASSESSMENT — PAIN DESCRIPTION - DESCRIPTORS
DESCRIPTORS: ACHING

## 2023-01-29 ASSESSMENT — PAIN DESCRIPTION - LOCATION
LOCATION: ABDOMEN

## 2023-01-29 ASSESSMENT — PAIN SCALES - GENERAL
PAINLEVEL_OUTOF10: 5
PAINLEVEL_OUTOF10: 3
PAINLEVEL_OUTOF10: 1
PAINLEVEL_OUTOF10: 7

## 2023-01-29 NOTE — PLAN OF CARE
Problem: Pain  Goal: Verbalizes/displays adequate comfort level or baseline comfort level  1/29/2023 1107 by Lise Montano RN  Outcome: Completed  1/29/2023 0405 by Rosey Alpers, RN  Outcome: Progressing     Problem: Safety - Adult  Goal: Free from fall injury  1/29/2023 1107 by Lise Montano RN  Outcome: Completed  1/29/2023 0405 by Rosey Alpers, RN  Outcome: Progressing     Problem: ABCDS Injury Assessment  Goal: Absence of physical injury  1/29/2023 1107 by Lise Montano RN  Outcome: Completed  1/29/2023 0405 by Rosey Alpers, RN  Outcome: Progressing

## 2023-01-29 NOTE — PLAN OF CARE
Problem: Pain  Goal: Verbalizes/displays adequate comfort level or baseline comfort level  1/29/2023 0405 by Rocky Harris RN  Outcome: Progressing  1/28/2023 1805 by Charleen Tapia RN  Outcome: Progressing

## 2023-01-29 NOTE — PROGRESS NOTES
[facility]  Speech Language Pathology  D/C speech orders. Patient is currently on a clear liquid diet with diverticulitis. Attempted to confirm the purpose of speech evaluation with physician via perfect serve however there was no response. Later ST spoke with RN who is in agreement with discharge of speech orders as we cannot identify a reason for a speech evaluation at this time. Please re-consult as needed.      Anna Torrez CCC-SLP   Speech-Language Pathologist

## 2023-01-29 NOTE — PROGRESS NOTES
PROGRESS NOTE          PATIENT NAME: Malvina Closs  MEDICAL RECORD NO. [id number]  DATE: 2023  SURGEON: Amna Yepez  PRIMARY CARE PHYSICIAN: Gina Baltazar MD    HD: # 2    ASSESSMENT    Patient Active Problem List   Diagnosis    Coronary artery disease s/p stent in  by Dr. Hlaie Wright at [facility]    Mixed hyperlipidemia    Essential hypertension    Priapism    Diverticulitis       MEDICAL DECISION MAKING AND PLAN    Sigmoid Diverticulitis   CT intramural abscess/phlegmon versus extraluminal collection indicating contained perforation  IVF, Zosyn, bowel rest, FLD  Patient to receive 10 total days of antibiotic therapy  Abx to be switched to augmentin when tolerating diet  WBC increase to 14.1, abdominal exam significantly improved  Hypokalemia  K+ 3.6, replace with effer k this AM      SUBJECTIVE    Gale Weber is has improved since yesterday. He states his abdominal pain is essentially gone and states he is hungry and is interested in advancing his diet. Patient states he his passing gas, having liquid Bms, has no other concerns. OBJECTIVE  VITALS: Temp: Temp: 98 °F (36.7 °C)Temp  Av.2 °F (36.8 °C)  Min: 98 °F (36.7 °C)  Max: 98.4 °F (24.5 °C) BP Systolic (25LNX), XCC:175 , Min:131 , PYV:211   Diastolic (17RXS), YKI:42, Min:68, Max:76   Pulse Pulse  Av  Min: 82  Max: 90 Resp Resp  Av.5  Min: 17  Max: 18 Pulse ox SpO2  Av %  Min: 96 %  Max: 96 %  GENERAL: alert, no distress  NEURO: aox4, moving all 4 extremities spontaneously  HEENT: normocephalic, atraumatic  LUNGS: clear to ausculation, without wheezes, rales or rhonci  HEART: normal rate and regular rhythm  ABDOMEN: soft, non-distended, mild abdominal tenderness in LLQ no guarding or peritoneal signs present  EXTREMITY: no cyanosis, clubbing or edema    I/O last 3 completed shifts: In: 800 [P.O.:800]  Out: 1525 [Urine:1525]    Drain/tube output:   In: 800 [P.O.:800]  Out: 975 [Urine:975]    LAB:  CBC:   Recent Labs 01/27/23  1329 01/28/23  0628 01/29/23  0453   WBC 14.7* 9.9 14.1*   HGB 12.0* 9.8* 9.7*   HCT 35.0* 29.6* 29.0*   MCV 92.6 95.2 95.7    266 308       BMP:   Recent Labs     01/27/23  1329 01/28/23  0628 01/29/23  0453    140 138   K 3.3* 3.3* 3.6*    105 105   CO2 25 25 25   BUN 9 8 6*   CREATININE 0.65* 0.71 0.64*   GLUCOSE 114* 92 73       COAGS:   Recent Labs     01/27/23  1329   PROT 7.3   INR 1.0         RADIOLOGY:        Lorenzo Sun MD  1/28/23, 10:34 AM        Attending Note    Change antibiotics to orals for completion 10 day course. I have reviewed the above TECSS note(s) and I either performed the key elements of the medical history and physical exam or was present with the resident when the key elements of the medical history and physical exam were performed. I have discussed the findings, established the care plan and recommendations with Residents.     Nava Bravo MD  1/29/2023  12:16 PM

## 2023-01-30 LAB
ABSOLUTE EOS #: 0.65 K/UL (ref 0–0.44)
ABSOLUTE IMMATURE GRANULOCYTE: 0.05 K/UL (ref 0–0.3)
ABSOLUTE LYMPH #: 1.84 K/UL (ref 1.1–3.7)
ABSOLUTE MONO #: 1.24 K/UL (ref 0.1–1.2)
ANION GAP SERPL CALCULATED.3IONS-SCNC: 8 MMOL/L (ref 9–17)
BASOPHILS # BLD: 0 % (ref 0–2)
BASOPHILS ABSOLUTE: 0.04 K/UL (ref 0–0.2)
BUN BLDV-MCNC: 6 MG/DL (ref 8–23)
CALCIUM SERPL-MCNC: 8.3 MG/DL (ref 8.6–10.4)
CHLORIDE BLD-SCNC: 106 MMOL/L (ref 98–107)
CO2: 24 MMOL/L (ref 20–31)
CREAT SERPL-MCNC: 0.7 MG/DL (ref 0.7–1.2)
EOSINOPHILS RELATIVE PERCENT: 7 % (ref 1–4)
GFR SERPL CREATININE-BSD FRML MDRD: >60 ML/MIN/1.73M2
GLUCOSE BLD-MCNC: 87 MG/DL (ref 70–99)
HCT VFR BLD CALC: 31 % (ref 40.7–50.3)
HEMOGLOBIN: 10.2 G/DL (ref 13–17)
IMMATURE GRANULOCYTES: 1 %
LYMPHOCYTES # BLD: 18 % (ref 24–43)
MAGNESIUM: 1.5 MG/DL (ref 1.6–2.6)
MCH RBC QN AUTO: 31.3 PG (ref 25.2–33.5)
MCHC RBC AUTO-ENTMCNC: 32.9 G/DL (ref 28.4–34.8)
MCV RBC AUTO: 95.1 FL (ref 82.6–102.9)
MONOCYTES # BLD: 12 % (ref 3–12)
NRBC AUTOMATED: 0 PER 100 WBC
PDW BLD-RTO: 13 % (ref 11.8–14.4)
PLATELET # BLD: 323 K/UL (ref 138–453)
PMV BLD AUTO: 9.7 FL (ref 8.1–13.5)
POTASSIUM SERPL-SCNC: 3.3 MMOL/L (ref 3.7–5.3)
RBC # BLD: 3.26 M/UL (ref 4.21–5.77)
SEG NEUTROPHILS: 62 % (ref 36–65)
SEGMENTED NEUTROPHILS ABSOLUTE COUNT: 6.22 K/UL (ref 1.5–8.1)
SODIUM BLD-SCNC: 138 MMOL/L (ref 135–144)
WBC # BLD: 10 K/UL (ref 3.5–11.3)

## 2023-01-30 PROCEDURE — 2580000003 HC RX 258: Performed by: STUDENT IN AN ORGANIZED HEALTH CARE EDUCATION/TRAINING PROGRAM

## 2023-01-30 PROCEDURE — 1200000000 HC SEMI PRIVATE

## 2023-01-30 PROCEDURE — 6360000002 HC RX W HCPCS

## 2023-01-30 PROCEDURE — 80048 BASIC METABOLIC PNL TOTAL CA: CPT

## 2023-01-30 PROCEDURE — 83735 ASSAY OF MAGNESIUM: CPT

## 2023-01-30 PROCEDURE — 6370000000 HC RX 637 (ALT 250 FOR IP)

## 2023-01-30 PROCEDURE — 85025 COMPLETE CBC W/AUTO DIFF WBC: CPT

## 2023-01-30 PROCEDURE — 2580000003 HC RX 258

## 2023-01-30 PROCEDURE — 6360000002 HC RX W HCPCS: Performed by: STUDENT IN AN ORGANIZED HEALTH CARE EDUCATION/TRAINING PROGRAM

## 2023-01-30 PROCEDURE — 36415 COLL VENOUS BLD VENIPUNCTURE: CPT

## 2023-01-30 RX ORDER — METRONIDAZOLE 500 MG/1
500 TABLET ORAL EVERY 8 HOURS SCHEDULED
Status: DISCONTINUED | OUTPATIENT
Start: 2023-01-30 | End: 2023-02-01 | Stop reason: HOSPADM

## 2023-01-30 RX ORDER — CIPROFLOXACIN 500 MG/1
500 TABLET, FILM COATED ORAL EVERY 12 HOURS SCHEDULED
Status: DISCONTINUED | OUTPATIENT
Start: 2023-01-30 | End: 2023-02-01 | Stop reason: HOSPADM

## 2023-01-30 RX ADMIN — IBUPROFEN 400 MG: 400 TABLET, FILM COATED ORAL at 04:26

## 2023-01-30 RX ADMIN — METRONIDAZOLE 500 MG: 500 TABLET, FILM COATED ORAL at 22:59

## 2023-01-30 RX ADMIN — ACETAMINOPHEN 1000 MG: 500 TABLET ORAL at 08:16

## 2023-01-30 RX ADMIN — ACETAMINOPHEN 1000 MG: 500 TABLET ORAL at 00:17

## 2023-01-30 RX ADMIN — ENOXAPARIN SODIUM 40 MG: 100 INJECTION SUBCUTANEOUS at 08:16

## 2023-01-30 RX ADMIN — CIPROFLOXACIN 500 MG: 500 TABLET, FILM COATED ORAL at 21:33

## 2023-01-30 RX ADMIN — METRONIDAZOLE 500 MG: 500 TABLET, FILM COATED ORAL at 13:35

## 2023-01-30 RX ADMIN — IBUPROFEN 400 MG: 400 TABLET, FILM COATED ORAL at 22:59

## 2023-01-30 RX ADMIN — CIPROFLOXACIN 500 MG: 500 TABLET, FILM COATED ORAL at 12:24

## 2023-01-30 RX ADMIN — IBUPROFEN 400 MG: 400 TABLET, FILM COATED ORAL at 10:13

## 2023-01-30 RX ADMIN — SODIUM CHLORIDE, PRESERVATIVE FREE 10 ML: 5 INJECTION INTRAVENOUS at 21:33

## 2023-01-30 RX ADMIN — PIPERACILLIN AND TAZOBACTAM 3375 MG: 3; .375 INJECTION, POWDER, FOR SOLUTION INTRAVENOUS at 04:28

## 2023-01-30 RX ADMIN — OXYCODONE HYDROCHLORIDE 5 MG: 5 TABLET ORAL at 21:32

## 2023-01-30 RX ADMIN — POTASSIUM BICARBONATE 40 MEQ: 782 TABLET, EFFERVESCENT ORAL at 13:38

## 2023-01-30 ASSESSMENT — PAIN SCALES - GENERAL
PAINLEVEL_OUTOF10: 4
PAINLEVEL_OUTOF10: 2
PAINLEVEL_OUTOF10: 7
PAINLEVEL_OUTOF10: 3

## 2023-01-30 ASSESSMENT — PAIN DESCRIPTION - LOCATION
LOCATION: ABDOMEN
LOCATION: ABDOMEN

## 2023-01-30 ASSESSMENT — PAIN DESCRIPTION - DESCRIPTORS
DESCRIPTORS: ACHING
DESCRIPTORS: ACHING

## 2023-01-30 NOTE — PROGRESS NOTES
PROGRESS NOTE          PATIENT NAME: Christi Barrientos  MEDICAL RECORD NO. [id number]  DATE: 2023  SURGEON: Neelam Whalen  PRIMARY CARE PHYSICIAN: Addy Lomax MD    HD: # 3    ASSESSMENT    Patient Active Problem List   Diagnosis    Coronary artery disease s/p stent in  by Dr. Kelsey Hopkins at [facility]    Mixed hyperlipidemia    Essential hypertension    Priapism    Diverticulitis       MEDICAL DECISION MAKING AND PLAN    Sigmoid Diverticulitis   CT intramural abscess/phlegmon versus extraluminal collection indicating contained perforation  IVF, Zosyn, advance to regular diet  Patient to receive 10 total days of antibiotic therapy  Abx to be switched to augmentin when tolerating diet  WBC trend back down to 10, abdominal exam significantly improved  Regular diet  Hypokalemia  K+ 3.3 replaced potassium this morning.     Plan: Discontinue Jean Baptiste catheter, resume regular diet. Patient is ready to be discharged and follow-up outpatient, continue Cipro and Flagyl dose antibiotics outpatient. Outpatient follow-up for colonoscopy. SUBJECTIVE    Christi Barrientos is has improved since yesterday. He states his abdominal pain is essentially gone and states he is hungry and is interested in advancing his once patient is able to tolerate regular diet. Patient tolerated the full liquid diet without nausea or vomiting. Patient states he his passing gas, having liquid Bms, has no other concerns.       OBJECTIVE  VITALS: Temp: Temp: 97.4 °F (36.3 °C)Temp  Av.8 °F (36.6 °C)  Min: 97.4 °F (36.3 °C)  Max: 98.1 °F (71.3 °C) BP Systolic (16LFQ), LMR:476 , Min:114 , LHL:468   Diastolic (08BCQ), PYC:75, Min:72, Max:72   Pulse Pulse  Av  Min: 81  Max: 87 Resp Resp  Av.5  Min: 17  Max: 18 Pulse ox SpO2  Av.5 %  Min: 97 %  Max: 98 %  GENERAL: alert, no distress  NEURO: aox4, moving all 4 extremities spontaneously  HEENT: normocephalic, atraumatic  LUNGS: clear to ausculation, without wheezes, rales or rhonci  HEART: normal rate and regular rhythm  ABDOMEN: soft, non-distended, mild abdominal tenderness in LLQ no guarding or peritoneal signs present  EXTREMITY: no cyanosis, clubbing or edema    I/O last 3 completed shifts:  In: -   Out: 300 [Urine:300]    Drain/tube output:  No intake/output data recorded. LAB:  CBC:   Recent Labs     01/28/23 0628 01/29/23 0453 01/30/23  0630   WBC 9.9 14.1* 10.0   HGB 9.8* 9.7* 10.2*   HCT 29.6* 29.0* 31.0*   MCV 95.2 95.7 95.1    308 323       BMP:   Recent Labs     01/28/23 0628 01/29/23 0453 01/30/23  0630    138 138   K 3.3* 3.6* 3.3*    105 106   CO2 25 25 24   BUN 8 6* 6*   CREATININE 0.71 0.64* 0.70   GLUCOSE 92 73 87       COAGS:   Recent Labs     01/27/23  1329   PROT 7.3   INR 1.0         RADIOLOGY:  No results found.     Electronically signed by Tello Pulido DO on 1/30/2023 at 12:55 PM

## 2023-01-30 NOTE — CONSULTS
[facility] Inpatient Psychotherapy Note  KATHIE Kidd   1/30/2023  11:30 AM  Khari Jefferson  1950  [id number]      Time spent with Patient: 5 Minutes         Presenting Patient Report:  Therapist met with pt to conduct Geriatric Screening after receiving [facility] Consult. Pt denied any current or hx of depression or anxiety. Pt denied any suicidal ideation. Pt presented as tired and did not want to talk about anything. Pt consented to Geriatric Depression Scale. Pt has no needs at this time. MSE:     Appearance    tired, sick, cooperative  Appetite abnormal: gastric illness  Sleep disturbance Yes  Fatigue Yes  Loss of pleasure No  Impulsive behavior No  Speech    normal volume, well articulated, and slow  Mood    normal  Affect    normal affect  Thought Content    intact  Thought Process    linear, goal directed, and coherent  Associations    logical connections  Insight    Good  Judgment    Intact  Orientation    oriented to person, place, time, and general circumstances  Memory    recent and remote memory intact  Attention/Concentration    intact  Morbid ideation No  Suicide Assessment    no suicidal ideation    (See C-SSRS in flow sheets if suicidal ideations are present)  (PCL-5, PHQ-9, MYRANDA-7 available in flow sheets)    Assessment:  Pt presented as tired but gave consent to complete GDS assessment. Pt denied any current or hx of anxiety or depression and denied suicidal ideation. Pt reported he was feeling \"sick\" but was not anxious or nervous about it. Pt's GDS score was a 1 and reported decreased energy but is typically in good spirits. Pt denied needing to talk about anything at this time. No bedside needs identified. Pt was given business card to contact therapist if needed. Screening Scale Results:  Geriatric Depression Scale: 1 (no depression indicated)     Diagnoses:  The following Diagnoses are based on currently available information and may change as additional information becomes available. No psychiatric disorder found after evaluation (Z03.89)      Recommendations / Plan:  -No diagnosis found  -No bedside needs identified  -Therapist can be contacted ([phone]) if needs arise       Pt interventions:  Established rapport, Conducted functional assessment, and Supportive techniques        Were changes or additions made to the treatment plan today?   YES []   NO []  Noted changes:

## 2023-01-30 NOTE — PROGRESS NOTES
Physical Therapy        Physical Therapy Cancel Note      DATE: 2023    NAME: Dawn Oliva  MRN: [id number]   : 1950      Patient not seen this date for Physical Therapy due to:    Patient independent with functional mobility. Will defer PT evaluation at this time. Please reorder PT if future needs arise.        Electronically signed by Perla Hunt PT on 2023 at 9:55 AM

## 2023-01-31 LAB
ABSOLUTE EOS #: 0.6 K/UL (ref 0–0.44)
ABSOLUTE IMMATURE GRANULOCYTE: 0.04 K/UL (ref 0–0.3)
ABSOLUTE LYMPH #: 2.38 K/UL (ref 1.1–3.7)
ABSOLUTE MONO #: 0.98 K/UL (ref 0.1–1.2)
ANION GAP SERPL CALCULATED.3IONS-SCNC: 12 MMOL/L (ref 9–17)
BASOPHILS # BLD: 0 % (ref 0–2)
BASOPHILS ABSOLUTE: <0.03 K/UL (ref 0–0.2)
BUN BLDV-MCNC: 5 MG/DL (ref 8–23)
CALCIUM SERPL-MCNC: 8.5 MG/DL (ref 8.6–10.4)
CHLORIDE BLD-SCNC: 106 MMOL/L (ref 98–107)
CO2: 23 MMOL/L (ref 20–31)
CREAT SERPL-MCNC: 0.67 MG/DL (ref 0.7–1.2)
EOSINOPHILS RELATIVE PERCENT: 7 % (ref 1–4)
GFR SERPL CREATININE-BSD FRML MDRD: >60 ML/MIN/1.73M2
GLUCOSE BLD-MCNC: 101 MG/DL (ref 75–110)
GLUCOSE BLD-MCNC: 69 MG/DL (ref 70–99)
HCT VFR BLD CALC: 29.8 % (ref 40.7–50.3)
HEMOGLOBIN: 9.9 G/DL (ref 13–17)
IMMATURE GRANULOCYTES: 1 %
LYMPHOCYTES # BLD: 28 % (ref 24–43)
MCH RBC QN AUTO: 31.3 PG (ref 25.2–33.5)
MCHC RBC AUTO-ENTMCNC: 33.2 G/DL (ref 28.4–34.8)
MCV RBC AUTO: 94.3 FL (ref 82.6–102.9)
MONOCYTES # BLD: 12 % (ref 3–12)
NRBC AUTOMATED: 0 PER 100 WBC
PDW BLD-RTO: 13.1 % (ref 11.8–14.4)
PLATELET # BLD: 333 K/UL (ref 138–453)
PMV BLD AUTO: 9.8 FL (ref 8.1–13.5)
POTASSIUM SERPL-SCNC: 3.9 MMOL/L (ref 3.7–5.3)
RBC # BLD: 3.16 M/UL (ref 4.21–5.77)
SEG NEUTROPHILS: 52 % (ref 36–65)
SEGMENTED NEUTROPHILS ABSOLUTE COUNT: 4.51 K/UL (ref 1.5–8.1)
SODIUM BLD-SCNC: 141 MMOL/L (ref 135–144)
WBC # BLD: 8.5 K/UL (ref 3.5–11.3)

## 2023-01-31 PROCEDURE — 6360000002 HC RX W HCPCS

## 2023-01-31 PROCEDURE — 6370000000 HC RX 637 (ALT 250 FOR IP)

## 2023-01-31 PROCEDURE — 82947 ASSAY GLUCOSE BLOOD QUANT: CPT

## 2023-01-31 PROCEDURE — 36415 COLL VENOUS BLD VENIPUNCTURE: CPT

## 2023-01-31 PROCEDURE — 1200000000 HC SEMI PRIVATE

## 2023-01-31 PROCEDURE — 85025 COMPLETE CBC W/AUTO DIFF WBC: CPT

## 2023-01-31 PROCEDURE — 80048 BASIC METABOLIC PNL TOTAL CA: CPT

## 2023-01-31 PROCEDURE — 2580000003 HC RX 258

## 2023-01-31 RX ADMIN — ACETAMINOPHEN 1000 MG: 500 TABLET ORAL at 00:25

## 2023-01-31 RX ADMIN — SODIUM CHLORIDE, POTASSIUM CHLORIDE, SODIUM LACTATE AND CALCIUM CHLORIDE: 600; 310; 30; 20 INJECTION, SOLUTION INTRAVENOUS at 10:22

## 2023-01-31 RX ADMIN — METRONIDAZOLE 500 MG: 500 TABLET, FILM COATED ORAL at 21:43

## 2023-01-31 RX ADMIN — IBUPROFEN 400 MG: 400 TABLET, FILM COATED ORAL at 21:43

## 2023-01-31 RX ADMIN — CIPROFLOXACIN 500 MG: 500 TABLET, FILM COATED ORAL at 08:40

## 2023-01-31 RX ADMIN — ENOXAPARIN SODIUM 40 MG: 100 INJECTION SUBCUTANEOUS at 08:40

## 2023-01-31 RX ADMIN — SODIUM CHLORIDE, POTASSIUM CHLORIDE, SODIUM LACTATE AND CALCIUM CHLORIDE: 600; 310; 30; 20 INJECTION, SOLUTION INTRAVENOUS at 18:39

## 2023-01-31 RX ADMIN — ACETAMINOPHEN 1000 MG: 500 TABLET ORAL at 08:41

## 2023-01-31 RX ADMIN — METRONIDAZOLE 500 MG: 500 TABLET, FILM COATED ORAL at 13:58

## 2023-01-31 RX ADMIN — SODIUM CHLORIDE, PRESERVATIVE FREE 10 ML: 5 INJECTION INTRAVENOUS at 08:42

## 2023-01-31 RX ADMIN — METRONIDAZOLE 500 MG: 500 TABLET, FILM COATED ORAL at 06:22

## 2023-01-31 RX ADMIN — CIPROFLOXACIN 500 MG: 500 TABLET, FILM COATED ORAL at 20:45

## 2023-01-31 RX ADMIN — IBUPROFEN 400 MG: 400 TABLET, FILM COATED ORAL at 10:21

## 2023-01-31 ASSESSMENT — PAIN DESCRIPTION - PAIN TYPE: TYPE: ACUTE PAIN

## 2023-01-31 ASSESSMENT — PAIN DESCRIPTION - LOCATION
LOCATION: ABDOMEN

## 2023-01-31 ASSESSMENT — PAIN SCALES - GENERAL
PAINLEVEL_OUTOF10: 3
PAINLEVEL_OUTOF10: 1
PAINLEVEL_OUTOF10: 3

## 2023-01-31 ASSESSMENT — PAIN DESCRIPTION - ORIENTATION
ORIENTATION: RIGHT
ORIENTATION: LEFT

## 2023-01-31 ASSESSMENT — PAIN DESCRIPTION - DESCRIPTORS
DESCRIPTORS: ACHING
DESCRIPTORS: DISCOMFORT
DESCRIPTORS: ACHING

## 2023-01-31 ASSESSMENT — PAIN DESCRIPTION - FREQUENCY: FREQUENCY: CONTINUOUS

## 2023-01-31 ASSESSMENT — PAIN - FUNCTIONAL ASSESSMENT: PAIN_FUNCTIONAL_ASSESSMENT: ACTIVITIES ARE NOT PREVENTED

## 2023-01-31 ASSESSMENT — PAIN DESCRIPTION - ONSET: ONSET: ON-GOING

## 2023-01-31 NOTE — PROGRESS NOTES
[facility])  Occupational Therapy Not Seen Note    DATE: 2023    NAME: Ya Rodriguez  MRN: [id number]   : 1950      Patient not seen this date for Occupational Therapy due to:    Patient independent with ADLs and functional tasks with no acute OT needs. Will defer OT evaluation at this time. Please reorder OT if future needs arise.        Electronically signed by ZOHRA Dangelo on 2023 at 4:04 PM

## 2023-01-31 NOTE — PROGRESS NOTES
PROGRESS NOTE          PATIENT NAME: So Frost  MEDICAL RECORD NO. [id number]  DATE: 2023  SURGEON: Tiana Coleman  PRIMARY CARE PHYSICIAN: Janell Herrera MD    HD: # 4    ASSESSMENT    Patient Active Problem List   Diagnosis    Coronary artery disease s/p stent in  by Dr. Sav Sanchez at [facility]    Mixed hyperlipidemia    Essential hypertension    Priapism    Diverticulitis       MEDICAL DECISION MAKING AND PLAN    Sigmoid Diverticulitis  CT intramural abscess/phlegmon versus extraluminal collection indicating contained perforation  Switch antibiotics from Zosyn to Cipro and Flagyl. Patient to receive 10 total days of antibiotic therapy    WBC trending down from 10-8.5, abdominal exam significantly improved  Regular diet  Hypokalemia  K+ 3.3 replaced potassium     Plan: Continue Cipro and Flagyl dose antibiotics outpatient. Continue regular diet, if patient is able to tolerate without nausea or vomiting. Planning to DC patient and have a outpatient follow-up for colonoscopy. Otherwise, he may need IR consult for diverticulitis abscess drainage. SUBJECTIVE    So Frost is has improved since yesterday. He states his abdominal pain has much improved, with some tenderness still in the LLQ, and states he is hungry and is interested in advancing his once patient is able to tolerate regular diet. Patient tolerated the full liquid diet without nausea or vomiting. Denies nausea or vomiting overnight. Patient states he his passing gas, lad a loose bowel movement last night, has no other concerns.       OBJECTIVE  VITALS: Temp: Temp: 97.7 °F (36.5 °C)Temp  Av.7 °F (36.5 °C)  Min: 97.4 °F (36.3 °C)  Max: 97.9 °F (17.6 °C) BP Systolic (50MFW), XUB:877 , Min:139 , ZNT:720   Diastolic (10YGP), GWU:97, Min:72, Max:77   Pulse Pulse  Av.3  Min: 81  Max: 86 Resp Resp  Av.3  Min: 16  Max: 17 Pulse ox SpO2  Av %  Min: 98 %  Max: 98 %  GENERAL: alert, no distress  NEURO: aox4, moving all 4 extremities spontaneously  HEENT: normocephalic, atraumatic  LUNGS: clear to ausculation, without wheezes, rales or rhonci  HEART: normal rate and regular rhythm  ABDOMEN: soft, non-distended, mild abdominal tenderness in LLQ no guarding or peritoneal signs present  EXTREMITY: no cyanosis, clubbing or edema    I/O last 3 completed shifts:  In: -   Out: 250 [Urine:250]    Drain/tube output: In: -   Out: 250 [Urine:250]    LAB:  CBC:   Recent Labs     01/29/23 0453 01/30/23 0630 01/31/23  0643   WBC 14.1* 10.0 8.5   HGB 9.7* 10.2* 9.9*   HCT 29.0* 31.0* 29.8*   MCV 95.7 95.1 94.3    323 333       BMP:   Recent Labs     01/29/23 0453 01/30/23 0630 01/31/23  0643    138 141   K 3.6* 3.3* 3.9    106 106   CO2 25 24 23   BUN 6* 6* 5*   CREATININE 0.64* 0.70 0.67*   GLUCOSE 73 87 69*       COAGS:   No results for input(s): APTT, PROT, INR in the last 72 hours. RADIOLOGY:  No results found. Electronically signed by Valerie Winter DO on 1/31/2023 at 10:59 AM            Trauma Attending Attestation      I have reviewed the above GCS note(s) and confirmed the key elements of the medical history and physical exam. I have seen and examined the pt. I have discussed the findings, established the care plan and recommendations with Resident.       Angus Duron DO  2/5/2023  8:29 PM

## 2023-02-01 VITALS
TEMPERATURE: 97.5 F | HEART RATE: 86 BPM | WEIGHT: 160 LBS | OXYGEN SATURATION: 96 % | BODY MASS INDEX: 20.53 KG/M2 | RESPIRATION RATE: 16 BRPM | HEIGHT: 74 IN | DIASTOLIC BLOOD PRESSURE: 76 MMHG | SYSTOLIC BLOOD PRESSURE: 131 MMHG

## 2023-02-01 LAB
ABSOLUTE EOS #: 0.52 K/UL (ref 0–0.44)
ABSOLUTE IMMATURE GRANULOCYTE: <0.03 K/UL (ref 0–0.3)
ABSOLUTE LYMPH #: 2.42 K/UL (ref 1.1–3.7)
ABSOLUTE MONO #: 1.02 K/UL (ref 0.1–1.2)
ANION GAP SERPL CALCULATED.3IONS-SCNC: 9 MMOL/L (ref 9–17)
BASOPHILS # BLD: 0 % (ref 0–2)
BASOPHILS ABSOLUTE: 0.03 K/UL (ref 0–0.2)
BUN SERPL-MCNC: 6 MG/DL (ref 8–23)
CALCIUM SERPL-MCNC: 8.5 MG/DL (ref 8.6–10.4)
CHLORIDE SERPL-SCNC: 107 MMOL/L (ref 98–107)
CO2 SERPL-SCNC: 24 MMOL/L (ref 20–31)
CREAT SERPL-MCNC: 0.74 MG/DL (ref 0.7–1.2)
EOSINOPHILS RELATIVE PERCENT: 7 % (ref 1–4)
GFR SERPL CREATININE-BSD FRML MDRD: >60 ML/MIN/1.73M2
GLUCOSE SERPL-MCNC: 91 MG/DL (ref 70–99)
HCT VFR BLD AUTO: 28.5 % (ref 40.7–50.3)
HGB BLD-MCNC: 9.5 G/DL (ref 13–17)
IMMATURE GRANULOCYTES: 0 %
LYMPHOCYTES # BLD: 32 % (ref 24–43)
MAGNESIUM SERPL-MCNC: 1.4 MG/DL (ref 1.6–2.6)
MCH RBC QN AUTO: 31.6 PG (ref 25.2–33.5)
MCHC RBC AUTO-ENTMCNC: 33.3 G/DL (ref 28.4–34.8)
MCV RBC AUTO: 94.7 FL (ref 82.6–102.9)
MICROORGANISM SPEC CULT: NORMAL
MICROORGANISM SPEC CULT: NORMAL
MONOCYTES # BLD: 13 % (ref 3–12)
NRBC AUTOMATED: 0 PER 100 WBC
PDW BLD-RTO: 12.9 % (ref 11.8–14.4)
PLATELET # BLD AUTO: 312 K/UL (ref 138–453)
PMV BLD AUTO: 9.3 FL (ref 8.1–13.5)
POTASSIUM SERPL-SCNC: 3.5 MMOL/L (ref 3.7–5.3)
RBC # BLD: 3.01 M/UL (ref 4.21–5.77)
SEG NEUTROPHILS: 48 % (ref 36–65)
SEGMENTED NEUTROPHILS ABSOLUTE COUNT: 3.67 K/UL (ref 1.5–8.1)
SERVICE CMNT-IMP: NORMAL
SERVICE CMNT-IMP: NORMAL
SODIUM SERPL-SCNC: 140 MMOL/L (ref 135–144)
SPECIMEN DESCRIPTION: NORMAL
SPECIMEN DESCRIPTION: NORMAL
WBC # BLD AUTO: 7.7 K/UL (ref 3.5–11.3)

## 2023-02-01 PROCEDURE — 6370000000 HC RX 637 (ALT 250 FOR IP)

## 2023-02-01 PROCEDURE — 80048 BASIC METABOLIC PNL TOTAL CA: CPT

## 2023-02-01 PROCEDURE — 83735 ASSAY OF MAGNESIUM: CPT

## 2023-02-01 PROCEDURE — 2580000003 HC RX 258

## 2023-02-01 PROCEDURE — 85025 COMPLETE CBC W/AUTO DIFF WBC: CPT

## 2023-02-01 PROCEDURE — 36415 COLL VENOUS BLD VENIPUNCTURE: CPT

## 2023-02-01 PROCEDURE — 6360000002 HC RX W HCPCS

## 2023-02-01 RX ORDER — MAGNESIUM SULFATE 1 G/100ML
1000 INJECTION INTRAVENOUS
Status: COMPLETED | OUTPATIENT
Start: 2023-02-01 | End: 2023-02-01

## 2023-02-01 RX ORDER — CHOLECALCIFEROL (VITAMIN D3) 125 MCG
5 CAPSULE ORAL NIGHTLY PRN
Status: DISCONTINUED | OUTPATIENT
Start: 2023-02-01 | End: 2023-02-01 | Stop reason: HOSPADM

## 2023-02-01 RX ORDER — METRONIDAZOLE 500 MG/1
500 TABLET ORAL EVERY 8 HOURS SCHEDULED
Qty: 15 TABLET | Refills: 0 | Status: SHIPPED | OUTPATIENT
Start: 2023-02-01 | End: 2023-02-06

## 2023-02-01 RX ORDER — CIPROFLOXACIN 500 MG/1
500 TABLET, FILM COATED ORAL EVERY 12 HOURS SCHEDULED
Qty: 9 TABLET | Refills: 0 | Status: SHIPPED | OUTPATIENT
Start: 2023-02-01 | End: 2023-02-06

## 2023-02-01 RX ORDER — POTASSIUM CHLORIDE 20 MEQ/1
20 TABLET, EXTENDED RELEASE ORAL 2 TIMES DAILY
Status: DISCONTINUED | OUTPATIENT
Start: 2023-02-01 | End: 2023-02-01 | Stop reason: HOSPADM

## 2023-02-01 RX ADMIN — SODIUM CHLORIDE, POTASSIUM CHLORIDE, SODIUM LACTATE AND CALCIUM CHLORIDE: 600; 310; 30; 20 INJECTION, SOLUTION INTRAVENOUS at 02:42

## 2023-02-01 RX ADMIN — ACETAMINOPHEN 1000 MG: 500 TABLET ORAL at 14:38

## 2023-02-01 RX ADMIN — ACETAMINOPHEN 1000 MG: 500 TABLET ORAL at 00:11

## 2023-02-01 RX ADMIN — MAGNESIUM SULFATE HEPTAHYDRATE 1000 MG: 1 INJECTION, SOLUTION INTRAVENOUS at 07:56

## 2023-02-01 RX ADMIN — METRONIDAZOLE 500 MG: 500 TABLET, FILM COATED ORAL at 14:37

## 2023-02-01 RX ADMIN — POTASSIUM CHLORIDE 20 MEQ: 1500 TABLET, EXTENDED RELEASE ORAL at 07:54

## 2023-02-01 RX ADMIN — MAGNESIUM SULFATE HEPTAHYDRATE 1000 MG: 1 INJECTION, SOLUTION INTRAVENOUS at 06:43

## 2023-02-01 RX ADMIN — ACETAMINOPHEN 1000 MG: 500 TABLET ORAL at 07:54

## 2023-02-01 RX ADMIN — CIPROFLOXACIN 500 MG: 500 TABLET, FILM COATED ORAL at 07:54

## 2023-02-01 RX ADMIN — IBUPROFEN 400 MG: 400 TABLET, FILM COATED ORAL at 05:37

## 2023-02-01 RX ADMIN — METRONIDAZOLE 500 MG: 500 TABLET, FILM COATED ORAL at 05:38

## 2023-02-01 RX ADMIN — MAGNESIUM SULFATE HEPTAHYDRATE 1000 MG: 1 INJECTION, SOLUTION INTRAVENOUS at 05:37

## 2023-02-01 RX ADMIN — IBUPROFEN 400 MG: 400 TABLET, FILM COATED ORAL at 14:37

## 2023-02-01 ASSESSMENT — PAIN DESCRIPTION - LOCATION
LOCATION: ABDOMEN
LOCATION: ABDOMEN

## 2023-02-01 ASSESSMENT — PAIN SCALES - GENERAL
PAINLEVEL_OUTOF10: 3
PAINLEVEL_OUTOF10: 0
PAINLEVEL_OUTOF10: 3
PAINLEVEL_OUTOF10: 0

## 2023-02-01 ASSESSMENT — PAIN DESCRIPTION - DESCRIPTORS
DESCRIPTORS: DISCOMFORT
DESCRIPTORS: DISCOMFORT

## 2023-02-01 NOTE — PROGRESS NOTES
CLINICAL PHARMACY NOTE: MEDS TO BEDS    Total # of Prescriptions Filled: 2   The following medications were delivered to the patient:  Cipro  flagyl    Additional Documentation:

## 2023-02-01 NOTE — PLAN OF CARE
Problem: Safety - Adult  Goal: Free from fall injury  2/1/2023 0601 by Nazario Slaughter RN  Outcome: Progressing  1/31/2023 1815 by Mark Deleon RN  Outcome: Progressing     Problem: Gastrointestinal - Adult  Goal: Minimal or absence of nausea and vomiting  Outcome: Progressing  Goal: Maintains or returns to baseline bowel function  Outcome: Progressing  Goal: Maintains adequate nutritional intake  Outcome: Progressing     Problem: Pain  Goal: Verbalizes/displays adequate comfort level or baseline comfort level  Outcome: Progressing

## 2023-02-01 NOTE — DISCHARGE SUMMARY
Discussed with the patient and all questioned fully answered. He will call me if any problems arise. IV removed without any complications. Wheeled downstairs to be transported home by family.

## 2023-02-01 NOTE — PROGRESS NOTES
PROGRESS NOTE      PATIENT NAME: Ghislaine Mcgrath  MEDICAL RECORD NO. [id number]  DATE: 2023  PRIMARY CARE PHYSICIAN: Everett Gardiner MD    HD: # 5    ASSESSMENT    Patient Active Problem List   Diagnosis    Coronary artery disease s/p stent in  by Dr. Mariposa Bhatti at [facility]    Mixed hyperlipidemia    Essential hypertension    Priapism    Diverticulitis       MEDICAL DECISION MAKING AND PLAN    Sigmoid Diverticulitis  CT small intramural abscess vs.phlegmon   Antibiotics switched from Zosyn to oral Cipro and Flagyl. Patient to receive 10 total days of antibiotic therapy  Monitoring leukocytosis-within normal limits   Low fiber diet, tolerating    Will need outpatient colonoscopy in 6-8 weeks at time of discharge. Plan for dc later today If continues to do well. Outpatient follow up with surgery team.     Regino Carlin is seen and examined. No acute events. Pain is controlled. Tolerating a diet without nausea or emesis. Abdominal pain resolved. Nontender on exam.     OBJECTIVE  VITALS: Temp: Temp: 97.5 °F (36.4 °C)Temp  Av.1 °F (36.7 °C)  Min: 97.5 °F (36.4 °C)  Max: 98.7 °F (48.7 °C) BP Systolic (83HHW), FVR:540 , Min:131 , LPN:762   Diastolic (27CZR), QVL:55, Min:71, Max:76   Pulse Pulse  Av.7  Min: 83  Max: 91 Resp Resp  Avg: 15.3  Min: 15  Max: 16 Pulse ox SpO2  Av.7 %  Min: 96 %  Max: 97 %  GENERAL: alert, no distress  NEURO: aox4, moving all 4 extremities spontaneously  HEENT: normocephalic, atraumatic  LUNGS:  normal effort, no accessory muscle use, no wheezing   HEART: normal rate and regular rhythm  ABDOMEN: soft, non-distended, nontender, no guarding   EXTREMITY: no cyanosis, clubbing or edema    I/O last 3 completed shifts: In: 7001.4 [P.O.:350; I.V.:6651.4]  Out: 850 [Urine:850]    Drain/tube output: In: 7001.4 [P.O.:350;  I.V.:6651.4]  Out: 600 [Urine:600]    LAB:  CBC:   Recent Labs     23  0630 23  0643 23  0306   WBC 10.0 8.5 7.7 HGB 10.2* 9.9* 9.5*   HCT 31.0* 29.8* 28.5*   MCV 95.1 94.3 94.7    333 312     BMP:   Recent Labs     01/30/23  0630 01/31/23  0643 02/01/23  0306    141 140   K 3.3* 3.9 3.5*    106 107   CO2 24 23 24   BUN 6* 5* 6*   CREATININE 0.70 0.67* 0.74   GLUCOSE 87 69* 91     COAGS:   No results for input(s): APTT, PROT, INR in the last 72 hours. RADIOLOGY:  No new imaging       Edwin Coffman, DO  General Surgery PGY-4        Attending Note      I have reviewed the above TECSS note(s) and I either performed the key elements of the medical history and physical exam or was present with the resident when the key elements of the medical history and physical exam were performed. I have discussed the findings, established the care plan and recommendations with Resident, STAR RN.     Dariana Lombardo MD  2/1/2023  2:53 PM

## 2023-02-02 NOTE — DISCHARGE SUMMARY
DISCHARGE SUMMARY:    PATIENT NAME:  Carlos Weber  YOB: 1950  MEDICAL RECORD NO. [id number]  DATE: 02/02/23  PRIMARY CARE PHYSICIAN: Violeta Fajardo MD  ADMIT DATE:  1/27/2023    DISCHARGE DATE:  2/1/2023  DISPOSITION:  Home  ADMITTING DIAGNOSIS:   diverticulitis    DIAGNOSIS:   Patient Active Problem List   Diagnosis    Coronary artery disease s/p stent in 2014 by Dr. Tamiko Gilman at [facility]    Mixed hyperlipidemia    Essential hypertension    Priapism    Diverticulitis       CONSULTANTS:  None    PROCEDURES:   None    HOSPITAL COURSE:   Carlos Weber is a 72 y.o. male who was admitted on 1/27/2023  Hospital Course:  Prox sigmoid diverticulitis w/ phlegmon, WBC 14.7, -AC.    1/27: Zosyn for 10 days, IVF, CLD, bowel rest  1/29: wbc bump, continue zoysn, FLD  1/30: dc shaikh. general diet. vomitX2  1/31: regular diet, improved pain, melatonin  2/1: mag 1.4, K 3.5, replaced    Labs and imaging were followed daily.      On day of discharge Carlos Weber  was tolerating a regular diet  had adequate analgeia on oral medications  had no signs of complication.  He was deemed medically stable for discharged to Home        PHYSICAL EXAMINATION:        Discharge Vitals:  height is 6' 2\" (1.88 m) and weight is 160 lb (72.6 kg). His oral temperature is 97.5 °F (36.4 °C). His blood pressure is 131/76 and his pulse is 86. His respiration is 16 and oxygen saturation is 96%.   Exam on day of discharge:  GENERAL: alert, no distress  NEURO: aox4, moving all 4 extremities spontaneously  HEENT: normocephalic, atraumatic  LUNGS:  normal effort, no accessory muscle use, no wheezing   HEART: normal rate and regular rhythm  ABDOMEN: soft, non-distended, nontender, no guarding   EXTREMITY: no cyanosis, clubbing or edema    LABS:     Recent Labs     01/31/23  0643 02/01/23  0306   WBC 8.5 7.7   HGB 9.9* 9.5*   HCT 29.8* 28.5*    312    140   K 3.9 3.5*    107   CO2 23 24   BUN 5* 6*   CREATININE  0.67* 0.74       DIAGNOSTIC TESTS:    CT ABDOMEN PELVIS W IV CONTRAST Additional Contrast? None    Result Date: 1/27/2023  EXAMINATION: CT OF THE ABDOMEN AND PELVIS WITH CONTRAST 1/27/2023 2:09 pm TECHNIQUE: CT of the abdomen and pelvis was performed with the administration of intravenous contrast. Multiplanar reformatted images are provided for review. Automated exposure control, iterative reconstruction, and/or weight based adjustment of the mA/kV was utilized to reduce the radiation dose to as low as reasonably achievable. COMPARISON: November 30, 2018 HISTORY: ORDERING SYSTEM PROVIDED HISTORY: suprapubic + LLQ pain TECHNOLOGIST PROVIDED HISTORY: suprapubic + LLQ pain Decision Support Exception - unselect if not a suspected or confirmed emergency medical condition->Emergency Medical Condition (MA) Reason for Exam: suprapubic LLQ pain FINDINGS: Lower Chest: No significant abnormality at the lung bases. Moderate hiatus hernia is present. Organs: Scattered calcified hepatic and splenic granulomas noted. No concerning liver or splenic lesion. Cholelithiasis is present in an otherwise unremarkable gallbladder. No biliary ductal dilatation. The pancreas and the adrenal glands are unremarkable. The kidneys enhance symmetrically without collecting system dilatation. No focal renal lesion. GI/Bowel: There is a long segment circumferential moderate wall thickening involving the proximal sigmoid colon associated with mild-moderate pericolonic fat stranding. There is a 2.4 cm low-attenuation collection along the posterolateral abnormal colonic segment which is inseparable from the colon (series 2, image 135). This could reflect intramural versus extraluminal abscess/phlegmon. There is no extraluminal air, pneumatosis or drainable abscess at this time. No bowel obstruction. The appendix is normal. Pelvis: The prostate is not enlarged. Small bladder diverticulum is noted.  There is mild diffuse bladder wall thickening and mild surrounding fat stranding. This could be reactive from adjacent acute diverticulitis. Peritoneum/Retroperitoneum: No abdominal lymphadenopathy or ascites. Bones/Soft Tissues: Bilateral chronic rib fracture deformities. No acute osseous abnormality. Changes related to prior right herniorrhaphy noted. Acute proximal sigmoid diverticulitis. Tiny 2.4 cm low-attenuation collection could reflect intramural abscess/phlegmon versus extraluminal collection indicating contained perforation. No drainable abscess at this time. Mild surrounding fat stranding along the bladder could be reactive from adjacent acute diverticulitis. Urinalysis/sampling could be performed as clinically indicated. Cholelithiasis.        DISCHARGE INSTRUCTIONS     Discharge Medications:        Medication List        START taking these medications      ciprofloxacin 500 MG tablet  Commonly known as: CIPRO  Take 1 tablet by mouth every 12 hours for 9 doses     metroNIDAZOLE 500 MG tablet  Commonly known as: FLAGYL  Take 1 tablet by mouth every 8 hours for 15 doses            CONTINUE taking these medications      aspirin 81 MG EC tablet  Commonly known as: Aspirin Low Dose  Take 1 tablet by mouth daily     carvedilol 6.25 MG tablet  Commonly known as: COREG  take 1 tablet by mouth twice a day with meals     rosuvastatin 20 MG tablet  Commonly known as: CRESTOR  Take 1 tablet by mouth nightly            STOP taking these medications      folic acid 1 MG tablet  Commonly known as: FOLVITE     lisinopril 10 MG tablet  Commonly known as: PRINIVIL;ZESTRIL     nitroGLYCERIN 0.4 MG SL tablet  Commonly known as: NITROSTAT     vitamin B-1 100 MG tablet  Commonly known as: THIAMINE     vitamin B-12 100 MCG tablet  Commonly known as: CYANOCOBALAMIN               Where to Get Your Medications        These medications were sent to [facility] [address] [facility], [address] [facility] [address] [address]      Phone: [phone]   ciprofloxacin 500 MG tablet  metroNIDAZOLE 500 MG tablet       Diet: No diet orders on file diet as tolerated    DISPOSITION: Home    Follow-up:  Loyda Sandoval MD  [address]  [phone]    Follow up  post hospital follow up    [address] [place] [address]  [phone]  Schedule an appointment as soon as possible for a visit in 2 week(s)  post hospital follow up in [address] [place] [address]        SIGNED:  JORDEN Jaeger CNP   2/2/2023, 1:40 PM  Time Spent for discharge: <30 minutes

## 2023-02-07 ENCOUNTER — OFFICE VISIT (OUTPATIENT)
Dept: INTERNAL MEDICINE | Age: 73
End: 2023-02-07

## 2023-02-07 VITALS
DIASTOLIC BLOOD PRESSURE: 67 MMHG | TEMPERATURE: 98 F | HEART RATE: 80 BPM | WEIGHT: 160.8 LBS | SYSTOLIC BLOOD PRESSURE: 102 MMHG | OXYGEN SATURATION: 96 % | HEIGHT: 74 IN | BODY MASS INDEX: 20.64 KG/M2

## 2023-02-07 DIAGNOSIS — Z09 HOSPITAL DISCHARGE FOLLOW-UP: Primary | ICD-10-CM

## 2023-02-07 DIAGNOSIS — K57.92 DIVERTICULITIS: ICD-10-CM

## 2023-02-07 DIAGNOSIS — E78.2 MIXED HYPERLIPIDEMIA: ICD-10-CM

## 2023-02-07 DIAGNOSIS — I10 ESSENTIAL HYPERTENSION: ICD-10-CM

## 2023-02-07 ASSESSMENT — PATIENT HEALTH QUESTIONNAIRE - PHQ9
SUM OF ALL RESPONSES TO PHQ QUESTIONS 1-9: 0
DEPRESSION UNABLE TO ASSESS: PT REFUSES
SUM OF ALL RESPONSES TO PHQ QUESTIONS 1-9: 0
2. FEELING DOWN, DEPRESSED OR HOPELESS: 0
1. LITTLE INTEREST OR PLEASURE IN DOING THINGS: 0
SUM OF ALL RESPONSES TO PHQ QUESTIONS 1-9: 0
SUM OF ALL RESPONSES TO PHQ QUESTIONS 1-9: 0
SUM OF ALL RESPONSES TO PHQ9 QUESTIONS 1 & 2: 0

## 2023-02-07 ASSESSMENT — ENCOUNTER SYMPTOMS
RECTAL PAIN: 0
CONSTIPATION: 0
COUGH: 0
VOMITING: 0
ABDOMINAL DISTENTION: 0
ABDOMINAL PAIN: 0
DIARRHEA: 0
NAUSEA: 0
BLOOD IN STOOL: 0
SHORTNESS OF BREATH: 0
WHEEZING: 0

## 2023-02-07 NOTE — PROGRESS NOTES
Post-Discharge Transitional Care Follow Up      Beba Johnson   YOB: 1950    Date of Office Visit:  2/7/2023  Date of Hospital Admission: 1/27/23  Date of Hospital Discharge: 2/1/23  Readmission Risk Score (high >=14%. Medium >=10%):Readmission Risk Score: 8.7      Care management risk score Rising risk (score 2-5) and Complex Care (Scores >=6): No Risk Score On File     Non face to face  following discharge, date last encounter closed (first attempt may have been earlier): *No documented post hospital discharge outreach found in the last 14 days     Call initiated 2 business days of discharge: *No response recorded in the last 14 days       Medical Decision Making: moderate complexity  No follow-ups on file. On this date 2/7/2023 I have spent 30 minutes reviewing previous notes, test results and face to face with the patient discussing the diagnosis and importance of compliance with the treatment plan as well as documenting on the day of the visit. Subjective:   Abdominal Pain  Pertinent negatives include no constipation, diarrhea, fever, headaches, nausea or vomiting. Patient was admitted in the hospital with abdominal pain, was found to have diverticulitis, also evaluated by general surgery started on antibiotics. Diet was advanced and eventually discharged on antibiotics with no interventions and follow-up on 2/14/2023. Inpatient course: Discharge summary reviewed- see chart. Interval history/Current status:   Patient states he has been feeling better ever since he was discharged. Abdominal pain has subsided and he has completed a course of antibiotics which were ciprofloxacin and metronidazole. Patient states that he has been tolerating regular diet well and bowel movements are regular and normal.  Patient denies any blood in stools, fevers and chills. Patient denies any difficulty in urination. Patient denies any chest pain any breathing difficulties.     Acute diverticulitis:  -Resolved, completed course of ciprofloxacin and metronidazole  -Has a follow-up appointment coming up with general surgery on 2/14/2023    Essential hypertension  -Well-controlled. Continue Coreg 6.25 mg twice daily. Lisinopril discontinued during the hospital visit. We will continue to monitor. Mixed hyperlipidemia  -Continues Crestor 20 mg daily. Coronary artery disease s/p stent in 2014 by Dr. Jorge Evans at [facility]  -Continue using Coreg, lisinopril and Crestor daily. History of colon polyps  -Referral given for GI for evaluation of repeat colonoscopy given history of colonic polyps in 2016     Patient Active Problem List   Diagnosis    Coronary artery disease s/p stent in 2014 by Dr. Jorge Evans at [facility]    Mixed hyperlipidemia    Essential hypertension    Priapism    Diverticulitis       Medications listed as ordered at the time of discharge from hospital     Medication List            Accurate as of February 7, 2023  2:51 PM. If you have any questions, ask your nurse or doctor.                 CONTINUE taking these medications      aspirin 81 MG EC tablet  Commonly known as: Aspirin Low Dose  Take 1 tablet by mouth daily     carvedilol 6.25 MG tablet  Commonly known as: COREG  take 1 tablet by mouth twice a day with meals     rosuvastatin 20 MG tablet  Commonly known as: CRESTOR  Take 1 tablet by mouth nightly               Medications marked \"taking\" at this time  Outpatient Medications Marked as Taking for the 2/7/23 encounter (Office Visit) with Kelvin Kent MD   Medication Sig Dispense Refill    carvedilol (COREG) 6.25 MG tablet take 1 tablet by mouth twice a day with meals 60 tablet 5    aspirin (ASPIRIN LOW DOSE) 81 MG EC tablet Take 1 tablet by mouth daily 60 tablet 5    rosuvastatin (CRESTOR) 20 MG tablet Take 1 tablet by mouth nightly 60 tablet 5        Medications patient taking as of now reconciled against medications ordered at time of hospital discharge: Yes    Review of Systems   Constitutional:  Negative for activity change, appetite change and fever. HENT:  Negative for congestion. Respiratory:  Negative for cough, shortness of breath and wheezing. Cardiovascular:  Negative for chest pain, palpitations and leg swelling. Gastrointestinal:  Negative for abdominal distention, abdominal pain, blood in stool, constipation, diarrhea, nausea, rectal pain and vomiting. Neurological:  Negative for dizziness, light-headedness and headaches. Psychiatric/Behavioral:  Negative for agitation and behavioral problems. Objective:    /67 (Site: Right Upper Arm, Position: Sitting, Cuff Size: Medium Adult)   Pulse 80   Temp 98 °F (36.7 °C)   Ht 6' 2\" (1.88 m)   Wt 160 lb 12.8 oz (72.9 kg)   SpO2 96%   BMI 20.65 kg/m²   Physical Exam  Constitutional:       General: He is not in acute distress. Appearance: Normal appearance. He is normal weight. He is not ill-appearing. HENT:      Head: Normocephalic and atraumatic. Nose: Nose normal.   Cardiovascular:      Rate and Rhythm: Normal rate and regular rhythm. Pulses: Normal pulses. Heart sounds: Normal heart sounds. No murmur heard. Pulmonary:      Effort: Pulmonary effort is normal. No respiratory distress. Abdominal:      General: Bowel sounds are normal. There is no distension. Palpations: Abdomen is soft. There is no mass. Tenderness: There is no abdominal tenderness. There is no guarding. Hernia: No hernia is present. Musculoskeletal:         General: No tenderness. Right lower leg: No edema. Left lower leg: No edema. Neurological:      Mental Status: He is alert and oriented to person, place, and time. Sensory: No sensory deficit. Psychiatric:         Behavior: Behavior normal.       An electronic signature was used to authenticate this note.   --Jorge Rm MD

## 2023-02-07 NOTE — PROGRESS NOTES
Attending Physician Statement  I have discussed the care of Sanjuanita Padilla, including pertinent history and exam findings with the resident. I have reviewed the key elements of all parts of the encounter with the resident. I agree with the assessment, and status of the problem list as documented. The plan and orders should include   Orders Placed This Encounter   Procedures    MD DISCHARGE MEDS RECONCILED W/ CURRENT OUTPATIENT MED LIST    and this was also documented by the resident. The medication list was reviewed with the resident and is up to date. The return visit should be in 3 months . Diagnosis Orders   1. Hospital discharge follow-up  MD DISCHARGE MEDS RECONCILED W/ CURRENT OUTPATIENT MED LIST      2. Diverticulitis        3. Mixed hyperlipidemia        4.  Essential hypertension             Familia Vora MD   Attending Physician, [facility], Internal Medicine Residency Program  [address] [place] [address]

## 2023-02-14 ENCOUNTER — OFFICE VISIT (OUTPATIENT)
Dept: SURGERY | Age: 73
End: 2023-02-14

## 2023-02-14 VITALS
DIASTOLIC BLOOD PRESSURE: 67 MMHG | BODY MASS INDEX: 20.53 KG/M2 | WEIGHT: 160 LBS | SYSTOLIC BLOOD PRESSURE: 104 MMHG | HEIGHT: 74 IN | HEART RATE: 86 BPM

## 2023-02-14 DIAGNOSIS — K57.92 DIVERTICULITIS: Primary | ICD-10-CM

## 2023-02-14 NOTE — H&P
Trauma and [address]      Patient's Name/ Date of Birth/ Gender: Charmayne Relic / 1950 (81 y.o.) / male     MRN/ACCOUNT #: [de-identified]    History of present Illness: Charmayne Relic is a 74 y.o. male, who presents for hospital follow-up after he was recently admitted on 1/27/2023 for proximal sigmoid diverticulitis with phlegmon. Patient was discharged on Cipro and Flagyl. He completed the entire antibiotic course. Patient denies any abdominal pain, nausea, vomiting, or fevers. He has been tolerating PO. He has been having normal bowel movements and urinating without issue. No bloody bowel movements. Past Medical History:  has a past medical history of CAD (coronary artery disease), Episode of syncope, Hyperlipidemia, and Hypertension. Past Surgical History:   Past Surgical History:   Procedure Laterality Date    ANKLE SURGERY      bilateral, pt states over 2 years ago    COLONOSCOPY      CORONARY ANGIOPLASTY WITH STENT PLACEMENT  2014    PENILE PROSTHESIS PLACEMENT N/A 8/3/2020    PENILE DISTAL SHUNT INSERTION performed by Cruzito Orellana MD at [place] [address]  08/03/2020    PENILE DISTAL SHUNT INSERTION     PTCA  2014       Social History:  reports that he quit smoking about 28 years ago. His smoking use included cigarettes. He has a 5.00 pack-year smoking history. He has never used smokeless tobacco. He reports current alcohol use. He reports that he does not use drugs. Family History: family history is not on file. Review of Systems:   Pertinent items are noted in HPI. Allergies: Patient has no known allergies.     Current Meds:  Current Outpatient Medications:     carvedilol (COREG) 6.25 MG tablet, take 1 tablet by mouth twice a day with meals, Disp: 60 tablet, Rfl: 5    aspirin (ASPIRIN LOW DOSE) 81 MG EC tablet, Take 1 tablet by mouth daily, Disp: 60 tablet, Rfl: 5    rosuvastatin (CRESTOR) 20 MG tablet, Take 1 tablet by mouth nightly, Disp: 60 tablet, Rfl: 5    Vital Signs:  Vitals:    02/14/23 1257   BP: 104/67   Pulse: 86       Physical Exam:  Vital signs and Nurse's note reviewed  Gen:  A&Ox3, NAD  HEENT: NCAT, PERRLA, EOMI, no scleral icterus, oral mucosa moist  Neck: Supple, no thyroid enlargement, no cervical or supraclavicular lymphaedenopathy  Chest: Symmetric rise with inhalation, no evidence of trauma  CVS: Regular rate and rhythm, no murmurs, no rubs or gallops  Resp: Good bilateral air entry, clear to auscultation b/l, no wheeze or rhonchi  Abd: soft, non-tender, non-distended, no hepatosplenomegaly or palpable masses, bowel sounds present. Ext: No clubbing, cyanosis, edema, peripheral pulses 2+ Rad/Fem/DP/PT  CNS: Moves all extremities, no gross focal motor deficits  Skin: No erythema or ulcerations     Labs:   CBC: No results for input(s): WBC, HGB, PLT in the last 72 hours. BMP:  No results for input(s): NA, K, CL, CO2, BUN, CREATININE, GLUCOSE in the last 72 hours. Hepatic: No results for input(s): AST, ALT, ALB, ALKPHOS, BILITOT, BILIDIR, LIPASE, AMYLASE in the last 72 hours. Coagulation: No results for input(s): APTT, PROT, INR in the last 72 hours. Problem List:  Patient Active Problem List    Diagnosis Date Noted    Diverticulitis 01/27/2023    Priapism 08/03/2020    Essential hypertension 02/14/2019    Coronary artery disease s/p stent in 2014 by Dr. Deepa Robertson at [address] [place] [address] 09/27/2016    Mixed hyperlipidemia 09/27/2016       Impression:    Chika Art is a 74 y.o. male with hospital follow-up for proximal sigmoid diverticulitis with phlegmon    Recommendation:    Patient already has a scheduled colonoscopy in 1 month. No need to follow-up in trauma surgery clinic.       Electronically signed by Brice West MD  on 2/14/2023 at 2:26 PM

## 2023-02-22 NOTE — PROGRESS NOTES
I personally evaluated the patient and directed the medical decision making with Resident/RAZ after the physical/radiologic exam and laboratory values were reviewed and confirmed.  ESTIVEN

## 2023-03-28 DIAGNOSIS — E78.2 MIXED HYPERLIPIDEMIA: ICD-10-CM

## 2023-03-29 RX ORDER — ROSUVASTATIN CALCIUM 20 MG/1
20 TABLET, COATED ORAL NIGHTLY
Qty: 60 TABLET | Refills: 5 | Status: SHIPPED | OUTPATIENT
Start: 2023-03-29

## 2023-03-29 NOTE — TELEPHONE ENCOUNTER
Last visit: 2/7/23  Last Med refill:   Does patient have enough medication for 72 hours: No:     Next Visit Date:  Future Appointments   Date Time Provider Hieu Mock   3/31/2023  1:45 PM Malik Tinajero MD [facility]   5/9/2023  1:20 PM Lashanda Juarez MD [facility] [address] Maintenance   Topic Date Due    Pneumococcal 65+ years Vaccine (1 - PCV) Never done    Colorectal Cancer Screen  04/07/2019    COVID-19 Vaccine (3 - Booster for Pfizer series) 05/13/2021    Annual Wellness Visit (AWV)  10/08/2022    Flu vaccine (1) 06/30/2023 (Originally 8/1/2022)    DTaP/Tdap/Td vaccine (1 - Tdap) 11/28/2023 (Originally 6/27/1967)    Shingles vaccine (1 of 2) 11/28/2023 (Originally 6/27/1998)    Lipids  01/12/2024    Depression Screen  02/07/2024    AAA screen  Completed    Hepatitis C screen  Completed    Hepatitis A vaccine  Aged Out    Hib vaccine  Aged Out    Meningococcal (ACWY) vaccine  Aged Out       No results found for: LABA1C          ( goal A1C is < 7)   No results found for: LABMICR  LDL Cholesterol (mg/dL)   Date Value   01/12/2023 37   10/26/2021 52       (goal LDL is <100)   AST (U/L)   Date Value   01/27/2023 18     ALT (U/L)   Date Value   01/27/2023 13     BUN (mg/dL)   Date Value   02/01/2023 6 (L)     BP Readings from Last 3 Encounters:   02/14/23 104/67   02/07/23 102/67   02/01/23 131/76          (goal 120/80)    All Future Testing planned in [facility]  Lab Frequency Next Occurrence               Patient Active Problem List:     Coronary artery disease s/p stent in 2014 by Dr. Yanira Thapa at [facility]     Mixed hyperlipidemia     Essential hypertension     Priapism     Diverticulitis

## 2023-03-31 ENCOUNTER — OFFICE VISIT (OUTPATIENT)
Dept: GASTROENTEROLOGY | Age: 73
End: 2023-03-31
Payer: MEDICARE

## 2023-03-31 VITALS
WEIGHT: 162 LBS | SYSTOLIC BLOOD PRESSURE: 132 MMHG | DIASTOLIC BLOOD PRESSURE: 70 MMHG | HEIGHT: 74 IN | BODY MASS INDEX: 20.79 KG/M2

## 2023-03-31 DIAGNOSIS — K57.92 DIVERTICULITIS: ICD-10-CM

## 2023-03-31 DIAGNOSIS — D12.6 ADENOMATOUS POLYP OF COLON, UNSPECIFIED PART OF COLON: Primary | ICD-10-CM

## 2023-03-31 PROCEDURE — 3075F SYST BP GE 130 - 139MM HG: CPT | Performed by: INTERNAL MEDICINE

## 2023-03-31 PROCEDURE — 1123F ACP DISCUSS/DSCN MKR DOCD: CPT | Performed by: INTERNAL MEDICINE

## 2023-03-31 PROCEDURE — 99203 OFFICE O/P NEW LOW 30 MIN: CPT | Performed by: INTERNAL MEDICINE

## 2023-03-31 PROCEDURE — 3078F DIAST BP <80 MM HG: CPT | Performed by: INTERNAL MEDICINE

## 2023-03-31 ASSESSMENT — ENCOUNTER SYMPTOMS
CHEST TIGHTNESS: 0
CONSTIPATION: 0
BLOOD IN STOOL: 0
ANAL BLEEDING: 0
SORE THROAT: 0
COUGH: 0
SHORTNESS OF BREATH: 0
RECTAL PAIN: 0
ABDOMINAL DISTENTION: 0
ABDOMINAL PAIN: 0
DIARRHEA: 0
NAUSEA: 0
CHOKING: 0
VOMITING: 0
TROUBLE SWALLOWING: 0
EYES NEGATIVE: 1

## 2023-03-31 NOTE — PROGRESS NOTES
Reason for Referral: Prior history of diverticulitis, prior history of colon polyps      Luis Miguel Cherry MD  [address]    Chief Complaint   Patient presents with    Colon Polyps                  HISTORY OF PRESENT ILLNESS: Tamar Millan is a 74 y.o. male with a past history remarkable for history of CAD, hyperlipidemia, hypertension, PCI with stent placement several years ago, currently on aspirin, referred for evaluation of colonoscopy. Patient had admission to the hospital in January 2023 with the patient was identified to have acute proximal sigmoid diverticulitis with a 2.4 cm low-attenuation collection which appears to be an intramural abscess, possible contained perforation. Patient was placed on antibiotics thereafter. No repeat imaging was performed. Patient denies any rectal bleeding, no bleeding per rectum. Denies any abdominal pain. Normal bowel movements per patient. Significant weight loss. Past Medical,Family, and Social History reviewed and does contribute to the patient presentingcondition. Patient's PMH/PSH,SH,PSYCH Hx, MEDs, ALLERGIES, and ROS were all reviewed and updated in the appropriate sections.     PAST MEDICAL HISTORY:  Past Medical History:   Diagnosis Date    CAD (coronary artery disease)     Episode of syncope 10/31/2019    Hyperlipidemia     Hypertension        Past Surgical History:   Procedure Laterality Date    ANKLE SURGERY      bilateral, pt states over 2 years ago    COLONOSCOPY      CORONARY ANGIOPLASTY WITH STENT PLACEMENT  2014    PENILE PROSTHESIS PLACEMENT N/A 8/3/2020    PENILE DISTAL SHUNT INSERTION performed by Omar Elam MD at [place] [address]  08/03/2020    PENILE DISTAL SHUNT INSERTION     PTCA  2014       CURRENT MEDICATIONS:    Current Outpatient Medications:     rosuvastatin (CRESTOR) 20 MG tablet, take 1 tablet by mouth nightly, Disp: 60 tablet, Rfl: 5    carvedilol (COREG) 6.25 MG tablet, take 1

## 2023-05-09 ENCOUNTER — OFFICE VISIT (OUTPATIENT)
Dept: INTERNAL MEDICINE | Age: 73
End: 2023-05-09
Payer: MEDICARE

## 2023-05-09 VITALS
WEIGHT: 164.2 LBS | TEMPERATURE: 98.2 F | DIASTOLIC BLOOD PRESSURE: 80 MMHG | SYSTOLIC BLOOD PRESSURE: 137 MMHG | OXYGEN SATURATION: 98 % | HEART RATE: 74 BPM | HEIGHT: 74 IN | BODY MASS INDEX: 21.07 KG/M2

## 2023-05-09 DIAGNOSIS — I25.10 CORONARY ARTERY DISEASE INVOLVING NATIVE CORONARY ARTERY OF NATIVE HEART WITHOUT ANGINA PECTORIS: ICD-10-CM

## 2023-05-09 DIAGNOSIS — I10 ESSENTIAL HYPERTENSION: Primary | ICD-10-CM

## 2023-05-09 DIAGNOSIS — K57.92 DIVERTICULITIS: ICD-10-CM

## 2023-05-09 DIAGNOSIS — E78.2 MIXED HYPERLIPIDEMIA: ICD-10-CM

## 2023-05-09 PROCEDURE — 99213 OFFICE O/P EST LOW 20 MIN: CPT | Performed by: STUDENT IN AN ORGANIZED HEALTH CARE EDUCATION/TRAINING PROGRAM

## 2023-05-09 PROCEDURE — 1123F ACP DISCUSS/DSCN MKR DOCD: CPT | Performed by: STUDENT IN AN ORGANIZED HEALTH CARE EDUCATION/TRAINING PROGRAM

## 2023-05-09 PROCEDURE — 3075F SYST BP GE 130 - 139MM HG: CPT | Performed by: STUDENT IN AN ORGANIZED HEALTH CARE EDUCATION/TRAINING PROGRAM

## 2023-05-09 PROCEDURE — 99211 OFF/OP EST MAY X REQ PHY/QHP: CPT | Performed by: INTERNAL MEDICINE

## 2023-05-09 PROCEDURE — 3078F DIAST BP <80 MM HG: CPT | Performed by: STUDENT IN AN ORGANIZED HEALTH CARE EDUCATION/TRAINING PROGRAM

## 2023-05-09 SDOH — ECONOMIC STABILITY: INCOME INSECURITY: HOW HARD IS IT FOR YOU TO PAY FOR THE VERY BASICS LIKE FOOD, HOUSING, MEDICAL CARE, AND HEATING?: NOT HARD AT ALL

## 2023-05-09 SDOH — ECONOMIC STABILITY: FOOD INSECURITY: WITHIN THE PAST 12 MONTHS, YOU WORRIED THAT YOUR FOOD WOULD RUN OUT BEFORE YOU GOT MONEY TO BUY MORE.: NEVER TRUE

## 2023-05-09 SDOH — ECONOMIC STABILITY: FOOD INSECURITY: WITHIN THE PAST 12 MONTHS, THE FOOD YOU BOUGHT JUST DIDN'T LAST AND YOU DIDN'T HAVE MONEY TO GET MORE.: NEVER TRUE

## 2023-05-09 SDOH — ECONOMIC STABILITY: HOUSING INSECURITY
IN THE LAST 12 MONTHS, WAS THERE A TIME WHEN YOU DID NOT HAVE A STEADY PLACE TO SLEEP OR SLEPT IN A SHELTER (INCLUDING NOW)?: NO

## 2023-05-09 ASSESSMENT — ENCOUNTER SYMPTOMS
VOMITING: 0
COUGH: 0
EYE DISCHARGE: 0
SHORTNESS OF BREATH: 0
NAUSEA: 0
BACK PAIN: 0
CHEST TIGHTNESS: 0
COLOR CHANGE: 0

## 2023-05-09 NOTE — PROGRESS NOTES
[facility]  [facility] [address] [address]  Dept: [phone]  Dept Fax: [phone]    Office Progress/Follow Up Note  Date ofpatient's visit: 5/9/2023  Patient's Name:  William Kahn YOB: 1950            Patient Care Team:  Roseanna Vasquez MD as PCP - General (Internal Medicine)  ================================================================    REASON FOR VISIT/CHIEF COMPLAINT:  Hypertension (Pt took medication today)    HISTORY OF PRESENTING ILLNESS:  History was obtained from: patient. William Kahn is a 74 y.o. is here for a routine follow up.     77-year-old male with past medical history of CAD with PCI and stent on aspirin, Coreg, rosuvastatin, hypertension, hyperlipidemia is here for routine follow-up. Patient was recently seen in GI clinic for follow-up of his diverticulitis for which she was admitted in the hospital noted to have 2.4 cm collection. CT abdomen is pending. He denies any chest pain, fever, chills, nausea vomiting, neurological deficit, numbness tingling, no change in urinary or bowel habitus. He lives alone. In clinic his blood pressure 146/76 with heart rate 72 repeat  blood pressure 137/80 he did take his medication.       Care maintenance  FIT in 2018: Negative     Patient Active Problem List   Diagnosis    Coronary artery disease s/p stent in 2014 by Dr. Cecile Carolina at [facility]    Mixed hyperlipidemia    Essential hypertension    Priapism    Diverticulitis       Health Maintenance Due   Topic Date Due    Pneumococcal 65+ years Vaccine (1 - PCV) Never done    Colorectal Cancer Screen  04/07/2019    COVID-19 Vaccine (3 - Booster for Pfizer series) 05/13/2021    Annual Wellness Visit (AWV)  10/08/2022       No Known Allergies      Current Outpatient Medications   Medication Sig Dispense Refill    rosuvastatin (CRESTOR) 20 MG tablet take 1 tablet by mouth nightly 60 tablet 5    carvedilol (COREG) 6.25 MG

## 2023-05-23 ENCOUNTER — TELEPHONE (OUTPATIENT)
Dept: INTERNAL MEDICINE | Age: 73
End: 2023-05-23

## 2023-07-17 DIAGNOSIS — E78.2 MIXED HYPERLIPIDEMIA: ICD-10-CM

## 2023-07-17 DIAGNOSIS — I10 ESSENTIAL HYPERTENSION: ICD-10-CM

## 2023-07-17 DIAGNOSIS — I25.10 CORONARY ARTERY DISEASE INVOLVING NATIVE CORONARY ARTERY OF NATIVE HEART WITHOUT ANGINA PECTORIS: ICD-10-CM

## 2023-07-17 RX ORDER — CARVEDILOL 6.25 MG/1
6.25 TABLET ORAL 2 TIMES DAILY WITH MEALS
Qty: 60 TABLET | Refills: 5 | Status: SHIPPED | OUTPATIENT
Start: 2023-07-17

## 2023-07-17 RX ORDER — ROSUVASTATIN CALCIUM 20 MG/1
20 TABLET, COATED ORAL NIGHTLY
Qty: 60 TABLET | Refills: 5 | Status: SHIPPED | OUTPATIENT
Start: 2023-07-17

## 2023-07-17 RX ORDER — ASPIRIN 81 MG/1
81 TABLET ORAL DAILY
Qty: 60 TABLET | Refills: 5 | Status: SHIPPED | OUTPATIENT
Start: 2023-07-17

## 2023-07-17 NOTE — TELEPHONE ENCOUNTER
Last visit: 5/9/23  Last Med refill:   Does patient have enough medication for 72 hours: No:     Next Visit Date:  Future Appointments   Date Time Provider [address]   11/7/2023  1:00 PM Anahi Ochoa MD [address] Maintenance   Topic Date Due    Pneumococcal 65+ years Vaccine (1 - PCV) Never done    Colorectal Cancer Screen  04/07/2019    COVID-19 Vaccine (3 - Booster for Pfizer series) 05/13/2021    Annual Wellness Visit (AWV)  10/08/2022    DTaP/Tdap/Td vaccine (1 - Tdap) 11/28/2023 (Originally 6/27/1967)    Shingles vaccine (1 of 2) 11/28/2023 (Originally 6/27/1998)    Flu vaccine (1) 08/01/2023    Lipids  01/12/2024    Depression Screen  02/07/2024    AAA screen  Completed    Hepatitis C screen  Completed    Hepatitis A vaccine  Aged Out    Hib vaccine  Aged Out    Meningococcal (ACWY) vaccine  Aged Out       No results found for: LABA1C          ( goal A1C is < 7)   No results found for: LABMICR  LDL Cholesterol (mg/dL)   Date Value   01/12/2023 37   10/26/2021 52       (goal LDL is <100)   AST (U/L)   Date Value   01/27/2023 18     ALT (U/L)   Date Value   01/27/2023 13     BUN (mg/dL)   Date Value   02/01/2023 6 (L)     BP Readings from Last 3 Encounters:   05/09/23 137/80   03/31/23 132/70   02/14/23 104/67          (goal 120/80)    All Future Testing planned in [facility]  Lab Frequency Next Occurrence   COLONOSCOPY (Screening) Once 04/14/2023   CT ABDOMEN PELVIS W IV CONTRAST Once 06/30/2023               Patient Active Problem List:     Coronary artery disease s/p stent in 2014 by Dr. Jennifer Guillaume at [facility]     Mixed hyperlipidemia     Essential hypertension     Priapism     Diverticulitis

## 2023-09-01 ENCOUNTER — OFFICE VISIT (OUTPATIENT)
Dept: INTERNAL MEDICINE | Age: 73
End: 2023-09-01

## 2023-09-01 VITALS
TEMPERATURE: 98.4 F | BODY MASS INDEX: 21.3 KG/M2 | HEIGHT: 74 IN | OXYGEN SATURATION: 95 % | DIASTOLIC BLOOD PRESSURE: 80 MMHG | WEIGHT: 166 LBS | SYSTOLIC BLOOD PRESSURE: 123 MMHG | HEART RATE: 90 BPM

## 2023-09-01 DIAGNOSIS — E78.2 MIXED HYPERLIPIDEMIA: ICD-10-CM

## 2023-09-01 DIAGNOSIS — I10 ESSENTIAL HYPERTENSION: ICD-10-CM

## 2023-09-01 DIAGNOSIS — I25.10 CORONARY ARTERY DISEASE INVOLVING NATIVE CORONARY ARTERY OF NATIVE HEART WITHOUT ANGINA PECTORIS: ICD-10-CM

## 2023-09-01 DIAGNOSIS — H61.23 BILATERAL IMPACTED CERUMEN: Primary | ICD-10-CM

## 2023-09-01 DIAGNOSIS — J30.2 SEASONAL ALLERGIES: ICD-10-CM

## 2023-09-01 RX ORDER — ASPIRIN 81 MG/1
81 TABLET ORAL DAILY
Qty: 60 TABLET | Refills: 5 | Status: SHIPPED | OUTPATIENT
Start: 2023-09-01

## 2023-09-01 RX ORDER — ROSUVASTATIN CALCIUM 20 MG/1
20 TABLET, COATED ORAL NIGHTLY
Qty: 60 TABLET | Refills: 5 | Status: SHIPPED | OUTPATIENT
Start: 2023-09-01

## 2023-09-01 RX ORDER — LORATADINE 10 MG/1
10 TABLET ORAL DAILY PRN
Qty: 30 TABLET | Refills: 5 | Status: SHIPPED | OUTPATIENT
Start: 2023-09-01

## 2023-09-01 RX ORDER — FLUTICASONE PROPIONATE 50 MCG
1 SPRAY, SUSPENSION (ML) NASAL DAILY
Qty: 32 G | Refills: 1 | Status: SHIPPED | OUTPATIENT
Start: 2023-09-01

## 2023-09-01 RX ORDER — CARVEDILOL 6.25 MG/1
6.25 TABLET ORAL 2 TIMES DAILY WITH MEALS
Qty: 60 TABLET | Refills: 5 | Status: SHIPPED | OUTPATIENT
Start: 2023-09-01

## 2023-09-01 ASSESSMENT — ENCOUNTER SYMPTOMS
EYE REDNESS: 0
EYE ITCHING: 1
ABDOMINAL PAIN: 0
RHINORRHEA: 1
SHORTNESS OF BREATH: 0

## 2023-09-01 NOTE — PROGRESS NOTES
assistance of a speech-recognition program. While intending to generate a document that actually reflects the content of the visit, the document can still have some mistakes which may not have been identified and corrected by editing.

## 2023-11-07 ENCOUNTER — OFFICE VISIT (OUTPATIENT)
Dept: INTERNAL MEDICINE | Age: 73
End: 2023-11-07
Payer: MEDICARE

## 2023-11-07 VITALS
DIASTOLIC BLOOD PRESSURE: 80 MMHG | HEART RATE: 75 BPM | WEIGHT: 167 LBS | HEIGHT: 74 IN | TEMPERATURE: 97.8 F | OXYGEN SATURATION: 99 % | SYSTOLIC BLOOD PRESSURE: 130 MMHG | BODY MASS INDEX: 21.43 KG/M2

## 2023-11-07 DIAGNOSIS — I25.10 CORONARY ARTERY DISEASE INVOLVING NATIVE CORONARY ARTERY OF NATIVE HEART WITHOUT ANGINA PECTORIS: ICD-10-CM

## 2023-11-07 DIAGNOSIS — E78.2 MIXED HYPERLIPIDEMIA: ICD-10-CM

## 2023-11-07 DIAGNOSIS — H61.22 CERUMEN DEBRIS ON TYMPANIC MEMBRANE OF LEFT EAR: Primary | ICD-10-CM

## 2023-11-07 DIAGNOSIS — I10 ESSENTIAL HYPERTENSION: ICD-10-CM

## 2023-11-07 DIAGNOSIS — Z12.11 ENCOUNTER FOR SCREENING COLONOSCOPY: ICD-10-CM

## 2023-11-07 PROCEDURE — 99213 OFFICE O/P EST LOW 20 MIN: CPT

## 2023-11-07 PROCEDURE — 3075F SYST BP GE 130 - 139MM HG: CPT

## 2023-11-07 PROCEDURE — 3079F DIAST BP 80-89 MM HG: CPT

## 2023-11-07 PROCEDURE — 1123F ACP DISCUSS/DSCN MKR DOCD: CPT

## 2023-11-07 RX ORDER — ROSUVASTATIN CALCIUM 20 MG/1
20 TABLET, COATED ORAL NIGHTLY
Qty: 60 TABLET | Refills: 5 | Status: SHIPPED | OUTPATIENT
Start: 2023-11-07

## 2023-11-07 RX ORDER — CARVEDILOL 6.25 MG/1
6.25 TABLET ORAL 2 TIMES DAILY WITH MEALS
Qty: 60 TABLET | Refills: 5 | Status: SHIPPED | OUTPATIENT
Start: 2023-11-07

## 2023-11-07 RX ORDER — ASPIRIN 81 MG/1
81 TABLET ORAL DAILY
Qty: 60 TABLET | Refills: 5 | Status: SHIPPED | OUTPATIENT
Start: 2023-11-07

## 2023-12-06 ENCOUNTER — TELEPHONE (OUTPATIENT)
Age: 73
End: 2023-12-06

## 2023-12-06 NOTE — TELEPHONE ENCOUNTER
Attempt 1, writer spoke with pt. He does not want to go to [facility]. [facility] or [facility] for procedure. Dr. Jese Roa only goes to [address] [place] [address] or [facility]. Pt only wants to go to [facility]. V's. Referral sent back to referring provider.

## 2024-01-30 NOTE — TELEPHONE ENCOUNTER
Faxed request from unnamed pharmacy received requesting refills on tacrolimus ointment, alcohol swabs, and niacin 500 mg tabs. Per chart review, none of these have been prescribed by our office before.    PC to pt to advise scheduling an appt to discuss request of these medications, no answer, phone just rings, no VM picks up. Pt will need to be evaluated for these medications. In addition, fax number of pharmacy on faxed request does not match pt's preferred pharmacy in chart.

## 2024-03-08 NOTE — TELEPHONE ENCOUNTER
Received faxed request from [facility] requesting alcohol swabs and tacrolimus ointment. Again, per chart review, writer does not see tacrolimus ointment ever being prescribed for pt. In addition, pt is not diabetic, unclear what alcohol swabs would be used for.    PC to pt to schedule appt to discuss medication requests, phone just rings, no VM picks up.    Request for medications faxed back to pharmacy indicating these have never been prescribed to pt and that he needs an appt to discuss request with PCP - unable to reach pt.

## 2024-09-04 DIAGNOSIS — J30.2 SEASONAL ALLERGIES: ICD-10-CM

## 2024-09-04 DIAGNOSIS — E78.2 MIXED HYPERLIPIDEMIA: ICD-10-CM

## 2024-09-04 DIAGNOSIS — I25.10 CORONARY ARTERY DISEASE INVOLVING NATIVE CORONARY ARTERY OF NATIVE HEART WITHOUT ANGINA PECTORIS: ICD-10-CM

## 2024-09-04 DIAGNOSIS — I10 ESSENTIAL HYPERTENSION: ICD-10-CM

## 2024-09-04 NOTE — TELEPHONE ENCOUNTER
Carlos Weber is calling to request a refill on the following medication(s):    Medication Request:  Requested Prescriptions     Pending Prescriptions Disp Refills    carvedilol (COREG) 6.25 MG tablet 60 tablet 5     Sig: Take 1 tablet by mouth 2 times daily (with meals)    aspirin (ASPIRIN LOW DOSE) 81 MG EC tablet 60 tablet 5     Sig: Take 1 tablet by mouth daily    rosuvastatin (CRESTOR) 20 MG tablet 60 tablet 5     Sig: Take 1 tablet by mouth nightly    fluticasone (FLONASE) 50 MCG/ACT nasal spray 32 g 1     Si spray by Each Nostril route daily    loratadine (CLARITIN) 10 MG tablet 30 tablet 5     Sig: Take 1 tablet by mouth daily as needed (seasonal allergies)       Last Visit Date (If Applicable):  2023    Next Visit Date:    2024

## 2024-09-05 RX ORDER — CARVEDILOL 6.25 MG/1
6.25 TABLET ORAL 2 TIMES DAILY WITH MEALS
Qty: 60 TABLET | Refills: 5 | Status: SHIPPED | OUTPATIENT
Start: 2024-09-05

## 2024-09-05 RX ORDER — LORATADINE 10 MG/1
10 TABLET ORAL DAILY PRN
Qty: 30 TABLET | Refills: 5 | Status: SHIPPED | OUTPATIENT
Start: 2024-09-05

## 2024-09-05 RX ORDER — ROSUVASTATIN CALCIUM 20 MG/1
20 TABLET, COATED ORAL NIGHTLY
Qty: 60 TABLET | Refills: 5 | Status: SHIPPED | OUTPATIENT
Start: 2024-09-05

## 2024-09-05 RX ORDER — FLUTICASONE PROPIONATE 50 MCG
1 SPRAY, SUSPENSION (ML) NASAL DAILY
Qty: 32 G | Refills: 1 | Status: SHIPPED | OUTPATIENT
Start: 2024-09-05

## 2024-09-05 RX ORDER — ASPIRIN 81 MG/1
81 TABLET ORAL DAILY
Qty: 60 TABLET | Refills: 5 | Status: SHIPPED | OUTPATIENT
Start: 2024-09-05

## 2024-09-17 ENCOUNTER — OFFICE VISIT (OUTPATIENT)
Dept: INTERNAL MEDICINE | Age: 74
End: 2024-09-17
Payer: MEDICARE

## 2024-09-17 VITALS
BODY MASS INDEX: 21.58 KG/M2 | TEMPERATURE: 98.2 F | HEART RATE: 77 BPM | OXYGEN SATURATION: 93 % | SYSTOLIC BLOOD PRESSURE: 145 MMHG | HEIGHT: 74 IN | DIASTOLIC BLOOD PRESSURE: 83 MMHG | WEIGHT: 168.2 LBS

## 2024-09-17 DIAGNOSIS — F10.90 CHRONIC ALCOHOL USE: ICD-10-CM

## 2024-09-17 DIAGNOSIS — I10 ESSENTIAL HYPERTENSION: ICD-10-CM

## 2024-09-17 DIAGNOSIS — I25.10 CORONARY ARTERY DISEASE INVOLVING NATIVE CORONARY ARTERY OF NATIVE HEART WITHOUT ANGINA PECTORIS: Primary | ICD-10-CM

## 2024-09-17 DIAGNOSIS — Z00.00 HEALTH CARE MAINTENANCE: ICD-10-CM

## 2024-09-17 DIAGNOSIS — R53.82 CHRONIC FATIGUE: ICD-10-CM

## 2024-09-17 DIAGNOSIS — R06.02 SHORTNESS OF BREATH: ICD-10-CM

## 2024-09-17 PROCEDURE — 1123F ACP DISCUSS/DSCN MKR DOCD: CPT

## 2024-09-17 PROCEDURE — 99211 OFF/OP EST MAY X REQ PHY/QHP: CPT | Performed by: INTERNAL MEDICINE

## 2024-09-17 PROCEDURE — 99213 OFFICE O/P EST LOW 20 MIN: CPT

## 2024-09-17 PROCEDURE — 3079F DIAST BP 80-89 MM HG: CPT

## 2024-09-17 PROCEDURE — 3077F SYST BP >= 140 MM HG: CPT

## 2024-09-17 RX ORDER — ASPIRIN 81 MG/1
81 TABLET ORAL DAILY
Qty: 60 TABLET | Refills: 5 | Status: SHIPPED | OUTPATIENT
Start: 2024-09-17

## 2024-09-17 RX ORDER — MULTIVIT-MIN/IRON FUM/FOLIC AC 7.5 MG-4
1 TABLET ORAL DAILY
Qty: 30 TABLET | Refills: 3 | Status: SHIPPED | OUTPATIENT
Start: 2024-09-17

## 2024-09-17 RX ORDER — THIAMINE MONONITRATE (VIT B1) 100 MG
100 TABLET ORAL DAILY
Qty: 30 TABLET | Refills: 3 | Status: SHIPPED | OUTPATIENT
Start: 2024-09-17

## 2024-09-17 RX ORDER — FUROSEMIDE 20 MG
20 TABLET ORAL DAILY
Qty: 60 TABLET | Refills: 3 | Status: SHIPPED | OUTPATIENT
Start: 2024-09-17

## 2024-09-17 RX ORDER — CARVEDILOL 12.5 MG/1
12.5 TABLET ORAL 2 TIMES DAILY WITH MEALS
Qty: 60 TABLET | Refills: 2 | Status: SHIPPED | OUTPATIENT
Start: 2024-09-17

## 2024-09-17 SDOH — ECONOMIC STABILITY: FOOD INSECURITY: WITHIN THE PAST 12 MONTHS, THE FOOD YOU BOUGHT JUST DIDN'T LAST AND YOU DIDN'T HAVE MONEY TO GET MORE.: NEVER TRUE

## 2024-09-17 SDOH — ECONOMIC STABILITY: FOOD INSECURITY: WITHIN THE PAST 12 MONTHS, YOU WORRIED THAT YOUR FOOD WOULD RUN OUT BEFORE YOU GOT MONEY TO BUY MORE.: NEVER TRUE

## 2024-09-17 SDOH — ECONOMIC STABILITY: INCOME INSECURITY: HOW HARD IS IT FOR YOU TO PAY FOR THE VERY BASICS LIKE FOOD, HOUSING, MEDICAL CARE, AND HEATING?: SOMEWHAT HARD

## 2024-09-17 ASSESSMENT — PATIENT HEALTH QUESTIONNAIRE - PHQ9
SUM OF ALL RESPONSES TO PHQ QUESTIONS 1-9: 0
1. LITTLE INTEREST OR PLEASURE IN DOING THINGS: NOT AT ALL
SUM OF ALL RESPONSES TO PHQ QUESTIONS 1-9: 0
2. FEELING DOWN, DEPRESSED OR HOPELESS: NOT AT ALL
SUM OF ALL RESPONSES TO PHQ9 QUESTIONS 1 & 2: 0

## 2024-09-17 ASSESSMENT — ENCOUNTER SYMPTOMS
WHEEZING: 0
CHEST TIGHTNESS: 0
ABDOMINAL DISTENTION: 0
CONSTIPATION: 0
STRIDOR: 0
COUGH: 0
PHOTOPHOBIA: 0
DIARRHEA: 0
ABDOMINAL PAIN: 0
SHORTNESS OF BREATH: 1

## 2024-09-24 ENCOUNTER — HOSPITAL ENCOUNTER (OUTPATIENT)
Age: 74
Discharge: HOME | End: 2024-09-26
Payer: MEDICARE

## 2024-09-24 ENCOUNTER — HOSPITAL ENCOUNTER (OUTPATIENT)
Age: 74
Discharge: HOME | End: 2024-09-24
Payer: MEDICARE

## 2024-09-24 ENCOUNTER — HOSPITAL ENCOUNTER (OUTPATIENT)
Dept: GENERAL RADIOLOGY | Age: 74
Discharge: HOME | End: 2024-09-26
Payer: MEDICARE

## 2024-09-24 DIAGNOSIS — F10.90 CHRONIC ALCOHOL USE: ICD-10-CM

## 2024-09-24 DIAGNOSIS — R06.02 SHORTNESS OF BREATH: ICD-10-CM

## 2024-09-24 DIAGNOSIS — R53.82 CHRONIC FATIGUE: ICD-10-CM

## 2024-09-24 DIAGNOSIS — I25.10 CORONARY ARTERY DISEASE INVOLVING NATIVE CORONARY ARTERY OF NATIVE HEART WITHOUT ANGINA PECTORIS: ICD-10-CM

## 2024-09-24 DIAGNOSIS — I10 ESSENTIAL HYPERTENSION: ICD-10-CM

## 2024-09-24 LAB
ALBUMIN SERPL-MCNC: 5.1 G/DL (ref 3.5–5.2)
ALBUMIN/GLOB SERPL: 2 {RATIO} (ref 1–2.5)
ALP SERPL-CCNC: 86 U/L (ref 40–129)
ALT SERPL-CCNC: 17 U/L (ref 10–50)
ANION GAP SERPL CALCULATED.3IONS-SCNC: 17 MMOL/L (ref 9–16)
AST SERPL-CCNC: 32 U/L (ref 10–50)
BILIRUB SERPL-MCNC: 0.9 MG/DL (ref 0–1.2)
BUN SERPL-MCNC: 15 MG/DL (ref 8–23)
CALCIUM SERPL-MCNC: 10 MG/DL (ref 8.6–10.4)
CHLORIDE SERPL-SCNC: 94 MMOL/L (ref 98–107)
CHOLEST SERPL-MCNC: 147 MG/DL (ref 0–199)
CHOLESTEROL/HDL RATIO: 2
CO2 SERPL-SCNC: 29 MMOL/L (ref 20–31)
CREAT SERPL-MCNC: 1.3 MG/DL (ref 0.7–1.2)
ECHO AO ASC DIAM: 3.3 CM
ECHO AO ROOT DIAM: 3 CM
ECHO AR MAX VEL PISA: 3.7 M/S
ECHO AV AREA PEAK VELOCITY: 2.8 CM2
ECHO AV AREA VTI: 3 CM2
ECHO AV MEAN GRADIENT: 2 MMHG
ECHO AV MEAN GRADIENT: 2 MMHG
ECHO AV MEAN VELOCITY: 0.6 M/S
ECHO AV PEAK GRADIENT: 3 MMHG
ECHO AV PEAK VELOCITY: 0.9 M/S
ECHO AV REGURGITANT PHT: 335 MS
ECHO AV VELOCITY RATIO: 0.89
ECHO AV VTI: 15.7 CM
ECHO EST RA PRESSURE: 3 MMHG
ECHO LA AREA 2C: 11.2 CM2
ECHO LA AREA 4C: 13.3 CM2
ECHO LA DIAMETER: 2.7 CM
ECHO LA MAJOR AXIS: 4.6 CM
ECHO LA MINOR AXIS: 4.4 CM
ECHO LA TO AORTIC ROOT RATIO: 0.9
ECHO LA VOL BP: 27 ML (ref 18–58)
ECHO LA VOL MOD A2C: 23 ML (ref 18–58)
ECHO LA VOL MOD A4C: 30 ML (ref 18–58)
ECHO LV E' LATERAL VELOCITY: 7.2 CM/S
ECHO LV E' SEPTAL VELOCITY: 5.6 CM/S
ECHO LV EDV 3D: 133 ML
ECHO LV EF PHYSICIAN: 50 %
ECHO LV EJECTION FRACTION 3D: 55 %
ECHO LV ESV 3D: 60 ML
ECHO LV FRACTIONAL SHORTENING: 23 % (ref 28–44)
ECHO LV INTERNAL DIMENSION DIASTOLIC: 4.3 CM (ref 4.2–5.9)
ECHO LV INTERNAL DIMENSION SYSTOLIC: 3.3 CM
ECHO LV IVSD: 0.9 CM (ref 0.6–1)
ECHO LV MASS 2D: 132.7 G (ref 88–224)
ECHO LV MASS 3D: 183 G
ECHO LV POSTERIOR WALL DIASTOLIC: 1 CM (ref 0.6–1)
ECHO LV RELATIVE WALL THICKNESS RATIO: 0.47
ECHO LVOT AREA: 3.1 CM2
ECHO LVOT AV VTI INDEX: 0.96
ECHO LVOT DIAM: 2 CM
ECHO LVOT MEAN GRADIENT: 1 MMHG
ECHO LVOT PEAK GRADIENT: 2 MMHG
ECHO LVOT PEAK VELOCITY: 0.8 M/S
ECHO LVOT SV: 47.1 ML
ECHO LVOT VTI: 15 CM
ECHO MV A VELOCITY: 0.88 M/S
ECHO MV AREA VTI: 1.7 CM2
ECHO MV E DECELERATION TIME (DT): 176 MS
ECHO MV E VELOCITY: 0.46 M/S
ECHO MV E/A RATIO: 0.52
ECHO MV E/E' LATERAL: 6.39
ECHO MV E/E' RATIO (AVERAGED): 7.3
ECHO MV E/E' SEPTAL: 8.21
ECHO MV LVOT VTI INDEX: 1.9
ECHO MV MAX VELOCITY: 1.2 M/S
ECHO MV MEAN GRADIENT: 2 MMHG
ECHO MV MEAN VELOCITY: 0.7 M/S
ECHO MV PEAK GRADIENT: 5 MMHG
ECHO MV VTI: 28.5 CM
ECHO PV MAX VELOCITY: 0.8 M/S
ECHO PV PEAK GRADIENT: 3 MMHG
ECHO RA AREA 4C: 11.3 CM2
ECHO RA VOLUME: 22 ML
ECHO RIGHT VENTRICULAR SYSTOLIC PRESSURE (RVSP): 23 MMHG
ECHO RV FREE WALL PEAK S': 10.4 CM/S
ECHO RV INTERNAL DIMENSION: 3.3 CM
ECHO RV TAPSE: 1.4 CM (ref 1.7–?)
ECHO TV REGURGITANT MAX VELOCITY: 2.23 M/S
ECHO TV REGURGITANT PEAK GRADIENT: 20 MMHG
ERYTHROCYTE [DISTWIDTH] IN BLOOD BY AUTOMATED COUNT: 12.9 % (ref 11.8–14.4)
GFR, ESTIMATED: 59 ML/MIN/1.73M2
GLUCOSE SERPL-MCNC: 108 MG/DL (ref 74–99)
HCT VFR BLD AUTO: 42.8 % (ref 40.7–50.3)
HDLC SERPL-MCNC: 78 MG/DL
HGB BLD-MCNC: 14.7 G/DL (ref 13–17)
LDLC SERPL CALC-MCNC: 51 MG/DL (ref 0–100)
MCH RBC QN AUTO: 32.7 PG (ref 25.2–33.5)
MCHC RBC AUTO-ENTMCNC: 34.3 G/DL (ref 28.4–34.8)
MCV RBC AUTO: 95.1 FL (ref 82.6–102.9)
NRBC BLD-RTO: 0 PER 100 WBC
PLATELET # BLD AUTO: 265 K/UL (ref 138–453)
PMV BLD AUTO: 9.9 FL (ref 8.1–13.5)
POTASSIUM SERPL-SCNC: 3.1 MMOL/L (ref 3.7–5.3)
PROT SERPL-MCNC: 8.1 G/DL (ref 6.6–8.7)
RBC # BLD AUTO: 4.5 M/UL (ref 4.21–5.77)
SODIUM SERPL-SCNC: 140 MMOL/L (ref 136–145)
TRIGL SERPL-MCNC: 92 MG/DL (ref 0–149)
TSH SERPL DL<=0.05 MIU/L-ACNC: 2.5 UIU/ML (ref 0.27–4.2)
VLDLC SERPL CALC-MCNC: 18 MG/DL
WBC OTHER # BLD: 7.1 K/UL (ref 3.5–11.3)

## 2024-09-24 PROCEDURE — 36415 COLL VENOUS BLD VENIPUNCTURE: CPT

## 2024-09-24 PROCEDURE — 71046 X-RAY EXAM CHEST 2 VIEWS: CPT

## 2024-09-24 PROCEDURE — 93306 TTE W/DOPPLER COMPLETE: CPT

## 2024-09-24 PROCEDURE — 80061 LIPID PANEL: CPT

## 2024-09-24 PROCEDURE — 93306 TTE W/DOPPLER COMPLETE: CPT | Performed by: INTERNAL MEDICINE

## 2024-09-24 PROCEDURE — 84443 ASSAY THYROID STIM HORMONE: CPT

## 2024-09-24 PROCEDURE — 80053 COMPREHEN METABOLIC PANEL: CPT

## 2024-09-24 PROCEDURE — 85027 COMPLETE CBC AUTOMATED: CPT

## 2024-09-25 DIAGNOSIS — I25.10 CORONARY ARTERY DISEASE INVOLVING NATIVE CORONARY ARTERY OF NATIVE HEART WITHOUT ANGINA PECTORIS: Primary | ICD-10-CM

## 2024-09-25 DIAGNOSIS — E87.6 HYPOKALEMIA: ICD-10-CM

## 2024-09-25 DIAGNOSIS — R93.89 ABNORMAL VENTRICULAR WALL MOTION: ICD-10-CM

## 2024-09-25 DIAGNOSIS — N17.9 AKI (ACUTE KIDNEY INJURY) (HCC): ICD-10-CM

## 2024-10-03 ENCOUNTER — HOSPITAL ENCOUNTER (OUTPATIENT)
Age: 74
Discharge: HOME | End: 2024-10-05
Payer: MEDICARE

## 2024-10-03 ENCOUNTER — HOSPITAL ENCOUNTER (OUTPATIENT)
Dept: NUCLEAR MEDICINE | Age: 74
Discharge: HOME | End: 2024-10-05
Payer: MEDICARE

## 2024-10-03 ENCOUNTER — TELEPHONE (OUTPATIENT)
Dept: INTERNAL MEDICINE | Age: 74
End: 2024-10-03

## 2024-10-03 ENCOUNTER — HOSPITAL ENCOUNTER (OUTPATIENT)
Age: 74
Discharge: HOME | End: 2024-10-03
Payer: MEDICARE

## 2024-10-03 DIAGNOSIS — I25.10 CORONARY ARTERY DISEASE INVOLVING NATIVE CORONARY ARTERY OF NATIVE HEART WITHOUT ANGINA PECTORIS: ICD-10-CM

## 2024-10-03 DIAGNOSIS — N17.9 AKI (ACUTE KIDNEY INJURY) (HCC): ICD-10-CM

## 2024-10-03 DIAGNOSIS — E87.6 HYPOKALEMIA: Primary | ICD-10-CM

## 2024-10-03 DIAGNOSIS — E87.6 HYPOKALEMIA: ICD-10-CM

## 2024-10-03 DIAGNOSIS — R93.89 ABNORMAL VENTRICULAR WALL MOTION: ICD-10-CM

## 2024-10-03 LAB
ANION GAP SERPL CALCULATED.3IONS-SCNC: 9 MMOL/L (ref 9–16)
BUN SERPL-MCNC: 16 MG/DL (ref 8–23)
CALCIUM SERPL-MCNC: 9 MG/DL (ref 8.6–10.4)
CHLORIDE SERPL-SCNC: 103 MMOL/L (ref 98–107)
CO2 SERPL-SCNC: 30 MMOL/L (ref 20–31)
CREAT SERPL-MCNC: 1.2 MG/DL (ref 0.7–1.2)
GFR, ESTIMATED: 64 ML/MIN/1.73M2
GLUCOSE SERPL-MCNC: 116 MG/DL (ref 74–99)
POTASSIUM SERPL-SCNC: 3.3 MMOL/L (ref 3.7–5.3)
SODIUM SERPL-SCNC: 142 MMOL/L (ref 136–145)

## 2024-10-03 PROCEDURE — 36415 COLL VENOUS BLD VENIPUNCTURE: CPT

## 2024-10-03 PROCEDURE — 3430000000 HC RX DIAGNOSTIC RADIOPHARMACEUTICAL

## 2024-10-03 PROCEDURE — 80048 BASIC METABOLIC PNL TOTAL CA: CPT

## 2024-10-03 PROCEDURE — A9500 TC99M SESTAMIBI: HCPCS

## 2024-10-03 PROCEDURE — 2580000003 HC RX 258

## 2024-10-03 RX ADMIN — SODIUM CHLORIDE, PRESERVATIVE FREE 10 ML: 5 INJECTION INTRAVENOUS at 09:05

## 2024-10-03 RX ADMIN — TETRAKIS(2-METHOXYISOBUTYLISOCYANIDE)COPPER(I) TETRAFLUOROBORATE 13.4 MILLICURIE: 1 INJECTION, POWDER, LYOPHILIZED, FOR SOLUTION INTRAVENOUS at 09:05

## 2024-10-03 NOTE — PROGRESS NOTES
Patient here for outpatient stress . Had labs drawn today also, and potassium level only 3.3. Discussed with Piedad Izquierdo CNP with cardiology, supervising stresses today. Stress cancelled and spoke with Radha at PCP office. Updated on all. Notified patient of all.IV dc'd. Patient agrees and will await further order. Resting scan was completed.

## 2024-10-03 NOTE — TELEPHONE ENCOUNTER
Rashmi Urbina Dr. from [facility] calling, Pt Mr Weber need a new Stress Test and Potassium Lab.     + K Results are in. Stress Test cancel.    Can reach Rashmi @ [phone].    Thank You.

## 2024-10-04 RX ORDER — TETRAKIS(2-METHOXYISOBUTYLISOCYANIDE)COPPER(I) TETRAFLUOROBORATE 1 MG/ML
13.4 INJECTION, POWDER, LYOPHILIZED, FOR SOLUTION INTRAVENOUS
Status: COMPLETED | OUTPATIENT
Start: 2024-10-04 | End: 2024-10-03

## 2024-10-04 RX ORDER — POTASSIUM CHLORIDE 1500 MG/1
20 TABLET, EXTENDED RELEASE ORAL DAILY
Qty: 30 TABLET | Refills: 0 | Status: SHIPPED | OUTPATIENT
Start: 2024-10-04 | End: 2024-11-03

## 2024-10-04 RX ORDER — SODIUM CHLORIDE 0.9 % (FLUSH) 0.9 %
10 SYRINGE (ML) INJECTION PRN
Status: DISCONTINUED | OUTPATIENT
Start: 2024-10-04 | End: 2024-10-06 | Stop reason: HOSPADM

## 2024-10-10 LAB — STRESS TARGET HR: 144 BPM

## 2024-10-11 ENCOUNTER — TELEPHONE (OUTPATIENT)
Dept: INTERNAL MEDICINE | Age: 74
End: 2024-10-11

## 2024-10-11 DIAGNOSIS — E87.6 HYPOKALEMIA: ICD-10-CM

## 2024-10-11 DIAGNOSIS — I25.10 CORONARY ARTERY DISEASE INVOLVING NATIVE CORONARY ARTERY OF NATIVE HEART WITHOUT ANGINA PECTORIS: Primary | ICD-10-CM

## 2024-10-11 DIAGNOSIS — R93.89 ABNORMAL VENTRICULAR WALL MOTION: ICD-10-CM

## 2024-10-11 NOTE — TELEPHONE ENCOUNTER
Pt's daughter calling in states pt needs new order for potassium recheck and stress test reordered. Per daughter pt has been taking potassium supplements but yesterday during stress test they had to stop after completing the first part d/t pt's potassium level.    Review of chart shows stress test was started on 10/3. I believe they just need to recheck potassium level to make sure it is safe, pt will need new stress test order as well.    Orders pended, please route back to me so I can notify daughter/patient that orders are active.

## 2024-10-14 LAB — STRESS TARGET HR: 144 BPM

## 2024-10-15 NOTE — TELEPHONE ENCOUNTER
Attempted PC to pt's daughter Marley, no answer. Unable to LVM mailbox not set up.    PC to pt to advise lab and test is ordered, no answer and no VM picks up. Will try again.

## 2024-10-16 NOTE — TELEPHONE ENCOUNTER
PC priscilla Roach re: orders, states she will get lab drawn on pt. Writer set personal reminder to check for results.

## 2024-10-21 ENCOUNTER — HOSPITAL ENCOUNTER (OUTPATIENT)
Age: 74
Setting detail: SPECIMEN
Discharge: HOME | End: 2024-10-21

## 2024-10-21 DIAGNOSIS — E87.6 HYPOKALEMIA: ICD-10-CM

## 2024-10-21 LAB
ANION GAP SERPL CALCULATED.3IONS-SCNC: 11 MMOL/L (ref 9–16)
BUN SERPL-MCNC: 14 MG/DL (ref 8–23)
CALCIUM SERPL-MCNC: 9.5 MG/DL (ref 8.6–10.4)
CHLORIDE SERPL-SCNC: 105 MMOL/L (ref 98–107)
CO2 SERPL-SCNC: 25 MMOL/L (ref 20–31)
CREAT SERPL-MCNC: 1 MG/DL (ref 0.7–1.2)
GFR, ESTIMATED: 75 ML/MIN/1.73M2
GLUCOSE SERPL-MCNC: 87 MG/DL (ref 74–99)
POTASSIUM SERPL-SCNC: 4.5 MMOL/L (ref 3.7–5.3)
SODIUM SERPL-SCNC: 141 MMOL/L (ref 136–145)

## 2024-10-22 ENCOUNTER — OFFICE VISIT (OUTPATIENT)
Dept: INTERNAL MEDICINE | Age: 74
End: 2024-10-22
Payer: MEDICARE

## 2024-10-22 VITALS
DIASTOLIC BLOOD PRESSURE: 87 MMHG | HEIGHT: 74 IN | WEIGHT: 168.8 LBS | OXYGEN SATURATION: 97 % | TEMPERATURE: 97.2 F | SYSTOLIC BLOOD PRESSURE: 133 MMHG | BODY MASS INDEX: 21.66 KG/M2 | HEART RATE: 85 BPM

## 2024-10-22 DIAGNOSIS — F10.90 CHRONIC ALCOHOL USE: ICD-10-CM

## 2024-10-22 DIAGNOSIS — R53.82 CHRONIC FATIGUE: ICD-10-CM

## 2024-10-22 DIAGNOSIS — I25.10 CORONARY ARTERY DISEASE INVOLVING NATIVE CORONARY ARTERY OF NATIVE HEART WITHOUT ANGINA PECTORIS: ICD-10-CM

## 2024-10-22 DIAGNOSIS — J30.2 SEASONAL ALLERGIES: ICD-10-CM

## 2024-10-22 DIAGNOSIS — I10 ESSENTIAL HYPERTENSION: ICD-10-CM

## 2024-10-22 DIAGNOSIS — Z12.11 SCREENING FOR COLORECTAL CANCER: ICD-10-CM

## 2024-10-22 DIAGNOSIS — I50.22 NYHA CLASS 2 AND ACC/AHA STAGE C CHRONIC SYSTOLIC CONGESTIVE HEART FAILURE (HCC): Primary | ICD-10-CM

## 2024-10-22 DIAGNOSIS — Z12.12 SCREENING FOR COLORECTAL CANCER: ICD-10-CM

## 2024-10-22 DIAGNOSIS — E55.9 VITAMIN D DEFICIENCY, UNSPECIFIED: ICD-10-CM

## 2024-10-22 DIAGNOSIS — H61.23 BILATERAL IMPACTED CERUMEN: ICD-10-CM

## 2024-10-22 PROCEDURE — 99213 OFFICE O/P EST LOW 20 MIN: CPT

## 2024-10-22 RX ORDER — MULTIVIT-MIN/IRON FUM/FOLIC AC 7.5 MG-4
1 TABLET ORAL DAILY
Qty: 30 TABLET | Refills: 3 | Status: SHIPPED | OUTPATIENT
Start: 2024-10-22

## 2024-10-22 RX ORDER — LORATADINE 10 MG/1
10 TABLET ORAL DAILY PRN
Qty: 30 TABLET | Refills: 5 | Status: SHIPPED | OUTPATIENT
Start: 2024-10-22

## 2024-10-22 RX ORDER — THIAMINE MONONITRATE (VIT B1) 100 MG
100 TABLET ORAL DAILY
Qty: 30 TABLET | Refills: 3 | Status: SHIPPED | OUTPATIENT
Start: 2024-10-22

## 2024-10-22 RX ORDER — ASPIRIN 81 MG/1
81 TABLET ORAL DAILY
Qty: 60 TABLET | Refills: 5 | Status: SHIPPED | OUTPATIENT
Start: 2024-10-22

## 2024-10-22 RX ORDER — CARVEDILOL 12.5 MG/1
12.5 TABLET ORAL 2 TIMES DAILY WITH MEALS
Qty: 60 TABLET | Refills: 2 | Status: SHIPPED | OUTPATIENT
Start: 2024-10-22

## 2024-10-22 RX ORDER — LISINOPRIL 2.5 MG/1
2.5 TABLET ORAL DAILY
Qty: 30 TABLET | Refills: 0 | Status: SHIPPED | OUTPATIENT
Start: 2024-10-22 | End: 2024-11-21

## 2024-10-22 ASSESSMENT — PATIENT HEALTH QUESTIONNAIRE - PHQ9
2. FEELING DOWN, DEPRESSED OR HOPELESS: NOT AT ALL
SUM OF ALL RESPONSES TO PHQ QUESTIONS 1-9: 0
1. LITTLE INTEREST OR PLEASURE IN DOING THINGS: NOT AT ALL
SUM OF ALL RESPONSES TO PHQ9 QUESTIONS 1 & 2: 0
SUM OF ALL RESPONSES TO PHQ QUESTIONS 1-9: 0

## 2024-10-22 NOTE — PROGRESS NOTES
Attending Physician Statement  I have discussed the care of Carlos Weber, including pertinent history and exam findings with the resident. I agree with the assessment, and status of the problem list as documented.   Diagnosis Orders   1. NYHA class 2 and ACC/AHA stage C chronic systolic congestive heart failure (HCC)  AFL (Epic) - Zeus Jerez DO, Cardiology, [place]    lisinopril (PRINIVIL;ZESTRIL) 2.5 MG tablet      2. Essential hypertension  carvedilol (COREG) 12.5 MG tablet      3. Coronary artery disease s/p stent in 2014 by Dr. Tamiko Gilman at [facility]  aspirin (ASPIRIN LOW DOSE) 81 MG EC tablet    carvedilol (COREG) 12.5 MG tablet      4. Seasonal allergies  loratadine (CLARITIN) 10 MG tablet      5. Chronic alcohol use  vitamin B-1 (THIAMINE) 100 MG tablet    Multiple Vitamins-Minerals (MULTIVITAMIN WITH MINERALS) tablet    Vitamin B12 & Folate      6. Chronic fatigue  vitamin B-1 (THIAMINE) 100 MG tablet    Multiple Vitamins-Minerals (MULTIVITAMIN WITH MINERALS) tablet    Vitamin B12 & Folate    Vitamin D 25 Hydroxy      7. Bilateral impacted cerumen  carbamide peroxide (DEBROX) 6.5 % otic solution    Sujit Monson MD, Otolaryngology, [place]      8. Vitamin D deficiency, unspecified  Vitamin D 25 Hydroxy         The plan and orders should include   Orders Placed This Encounter   Procedures    Vitamin B12 & Folate    Vitamin D 25 Hydroxy    Sujit Monson MD, Otolaryngology, [place]    AFL (Epic) - Zeus Jerez DO, Cardiology, [place]    and this was also documented by the resident.  I agree with the referral to ENT and CARDIO.The medication list was reviewed with the resident and is up to date. The return visit should be in 3 months .    Dr Raleigh Bernard MD, FACP  Associate , Internal Medicine Residency Program  Residency [facility] , [facility]  Chair, Department of Internal Medicine  [facility]er[facility]         10/22/2024, 2:16 PM        
patient questions answered.  Pt voiced understanding.     Patient given educational materials - see patient instructions    Bruce Maher MD      Department of Internal Medicine  [facility], [place]         10/22/2024, 2:37 PM      This note is created with the assistance of a speech-recognition program. While intending to generate a document that actually reflects the content of the visit, the document can still have some mistakes which may not have been identified and corrected by editing.

## 2024-11-26 DIAGNOSIS — I50.22 NYHA CLASS 2 AND ACC/AHA STAGE C CHRONIC SYSTOLIC CONGESTIVE HEART FAILURE (HCC): ICD-10-CM

## 2024-11-26 DIAGNOSIS — E87.6 HYPOKALEMIA: ICD-10-CM

## 2024-11-26 DIAGNOSIS — F10.90 CHRONIC ALCOHOL USE: ICD-10-CM

## 2024-11-26 DIAGNOSIS — H61.23 BILATERAL IMPACTED CERUMEN: ICD-10-CM

## 2024-11-26 DIAGNOSIS — R53.82 CHRONIC FATIGUE: ICD-10-CM

## 2024-11-26 NOTE — TELEPHONE ENCOUNTER
Carlos Weber is calling to request a refill on the following medication(s):    Medication Request:  Requested Prescriptions     Pending Prescriptions Disp Refills    Multiple Vitamins-Minerals (MULTIVITAMIN) TABS [Pharmacy Med Name: THERA M TABLET] 30 tablet 2     Sig: TAKE 1 TABLET BY MOUTH DAILY    GNP EARWAX REMOVAL DROPS 6.5 % otic solution [Pharmacy Med Name: GNP EARWAX REMOVAL DROPS 6.500 % SOLUTION] 15 mL 0     Sig: PLACE 5 DROPS INTO AFFECTED EAR(S) 2 TIMES DAILY    lisinopril (PRINIVIL;ZESTRIL) 2.5 MG tablet [Pharmacy Med Name: LISINOPRIL 2.5MG] 30 tablet 0     Sig: TAKE 1 TABLET BY MOUTH DAILY       Last Visit Date (If Applicable):  10/22/2024    Next Visit Date:    1/21/2025

## 2024-11-26 NOTE — TELEPHONE ENCOUNTER
Carlos Weber is calling to request a refill on the following medication(s):    Medication Request:  Requested Prescriptions     Pending Prescriptions Disp Refills    potassium chloride (KLOR-CON M) 20 MEQ extended release tablet [Pharmacy Med Name: *POTASSIUM CHLORIDE ER 20MEQ] 30 tablet 0     Sig: TAKE 1 TABLET BY MOUTH DAILY       Last Visit Date (If Applicable):  10/22/2024    Next Visit Date:    11/26/2024

## 2024-11-27 RX ORDER — CARBAMIDE PEROXIDE 65 MG/ML
LIQUID TOPICAL
Qty: 15 ML | Refills: 0 | Status: SHIPPED | OUTPATIENT
Start: 2024-11-27

## 2024-11-27 RX ORDER — MULTIVIT-MIN/IRON FUM/FOLIC AC 19 MG-400
1 TABLET ORAL DAILY
Qty: 30 TABLET | Refills: 2 | Status: SHIPPED | OUTPATIENT
Start: 2024-11-27

## 2024-11-27 RX ORDER — LISINOPRIL 2.5 MG/1
2.5 TABLET ORAL DAILY
Qty: 30 TABLET | Refills: 0 | Status: SHIPPED | OUTPATIENT
Start: 2024-11-27 | End: 2024-12-27

## 2024-11-27 RX ORDER — POTASSIUM CHLORIDE 1500 MG/1
20 TABLET, EXTENDED RELEASE ORAL DAILY
Qty: 30 TABLET | Refills: 0 | OUTPATIENT
Start: 2024-11-27 | End: 2024-12-27

## 2025-01-23 DIAGNOSIS — I50.22 NYHA CLASS 2 AND ACC/AHA STAGE C CHRONIC SYSTOLIC CONGESTIVE HEART FAILURE (HCC): ICD-10-CM

## 2025-01-23 RX ORDER — LISINOPRIL 2.5 MG/1
2.5 TABLET ORAL DAILY
Qty: 30 TABLET | Refills: 2 | Status: SHIPPED | OUTPATIENT
Start: 2025-01-23 | End: 2025-04-23

## 2025-01-23 NOTE — TELEPHONE ENCOUNTER
Carlos Weber is calling to request a refill on the following medication(s):    Medication Request:  Requested Prescriptions     Pending Prescriptions Disp Refills    lisinopril (PRINIVIL;ZESTRIL) 2.5 MG tablet [Pharmacy Med Name: LISINOPRIL 2.500 MG TABLET] 30 tablet 0     Sig: TAKE 1 TABLET BY MOUTH DAILY       Last Visit Date (If Applicable):  10/22/2024    Next Visit Date:    3/4/2025

## 2025-03-03 ENCOUNTER — HOSPITAL ENCOUNTER (OUTPATIENT)
Age: 75
Setting detail: SPECIMEN
Discharge: HOME | End: 2025-03-03

## 2025-03-03 DIAGNOSIS — R53.82 CHRONIC FATIGUE: ICD-10-CM

## 2025-03-03 DIAGNOSIS — E55.9 VITAMIN D DEFICIENCY, UNSPECIFIED: ICD-10-CM

## 2025-03-03 DIAGNOSIS — F10.90 CHRONIC ALCOHOL USE: ICD-10-CM

## 2025-03-03 LAB
25(OH)D3 SERPL-MCNC: 11.2 NG/ML (ref 30–100)
FOLATE SERPL-MCNC: 7 NG/ML (ref 4.8–24.2)
VIT B12 SERPL-MCNC: 371 PG/ML (ref 232–1245)

## 2025-03-04 ENCOUNTER — OFFICE VISIT (OUTPATIENT)
Dept: INTERNAL MEDICINE | Age: 75
End: 2025-03-04
Payer: COMMERCIAL

## 2025-03-04 VITALS
SYSTOLIC BLOOD PRESSURE: 132 MMHG | OXYGEN SATURATION: 98 % | DIASTOLIC BLOOD PRESSURE: 82 MMHG | HEART RATE: 82 BPM | BODY MASS INDEX: 21.94 KG/M2 | HEIGHT: 74 IN | WEIGHT: 171 LBS

## 2025-03-04 DIAGNOSIS — I50.22 NYHA CLASS 2 AND ACC/AHA STAGE C CHRONIC SYSTOLIC CONGESTIVE HEART FAILURE (HCC): ICD-10-CM

## 2025-03-04 DIAGNOSIS — E78.2 MIXED HYPERLIPIDEMIA: ICD-10-CM

## 2025-03-04 DIAGNOSIS — F10.90 CHRONIC ALCOHOL USE: ICD-10-CM

## 2025-03-04 DIAGNOSIS — I25.10 CORONARY ARTERY DISEASE INVOLVING NATIVE CORONARY ARTERY OF NATIVE HEART WITHOUT ANGINA PECTORIS: ICD-10-CM

## 2025-03-04 DIAGNOSIS — R53.82 CHRONIC FATIGUE: ICD-10-CM

## 2025-03-04 DIAGNOSIS — K63.5 POLYP OF COLON, UNSPECIFIED PART OF COLON, UNSPECIFIED TYPE: ICD-10-CM

## 2025-03-04 DIAGNOSIS — I10 ESSENTIAL HYPERTENSION: ICD-10-CM

## 2025-03-04 DIAGNOSIS — J30.2 SEASONAL ALLERGIES: ICD-10-CM

## 2025-03-04 DIAGNOSIS — H53.9 VISION ABNORMALITIES: ICD-10-CM

## 2025-03-04 DIAGNOSIS — H61.23 BILATERAL IMPACTED CERUMEN: ICD-10-CM

## 2025-03-04 DIAGNOSIS — E55.9 VITAMIN D DEFICIENCY: Primary | ICD-10-CM

## 2025-03-04 PROCEDURE — 99213 OFFICE O/P EST LOW 20 MIN: CPT

## 2025-03-04 RX ORDER — ROSUVASTATIN CALCIUM 20 MG/1
20 TABLET, COATED ORAL NIGHTLY
Qty: 60 TABLET | Refills: 5 | Status: SHIPPED | OUTPATIENT
Start: 2025-03-04

## 2025-03-04 RX ORDER — THIAMINE MONONITRATE (VIT B1) 100 MG
100 TABLET ORAL DAILY
Qty: 30 TABLET | Refills: 3 | Status: SHIPPED | OUTPATIENT
Start: 2025-03-04

## 2025-03-04 RX ORDER — MULTIVIT-MIN/IRON FUM/FOLIC AC 19 MG-400
1 TABLET ORAL DAILY
Qty: 30 TABLET | Refills: 2 | Status: SHIPPED | OUTPATIENT
Start: 2025-03-04

## 2025-03-04 RX ORDER — LISINOPRIL 2.5 MG/1
2.5 TABLET ORAL DAILY
Qty: 30 TABLET | Refills: 2 | Status: SHIPPED | OUTPATIENT
Start: 2025-03-04 | End: 2025-06-02

## 2025-03-04 RX ORDER — FOLIC ACID 1 MG/1
1 TABLET ORAL DAILY
Qty: 90 TABLET | Refills: 1 | Status: SHIPPED | OUTPATIENT
Start: 2025-03-04

## 2025-03-04 RX ORDER — CARVEDILOL 12.5 MG/1
12.5 TABLET ORAL 2 TIMES DAILY WITH MEALS
Qty: 60 TABLET | Refills: 2 | Status: SHIPPED | OUTPATIENT
Start: 2025-03-04

## 2025-03-04 RX ORDER — ERGOCALCIFEROL 1.25 MG/1
50000 CAPSULE, LIQUID FILLED ORAL WEEKLY
Qty: 6 CAPSULE | Refills: 0 | Status: SHIPPED | OUTPATIENT
Start: 2025-03-04 | End: 2025-04-09

## 2025-03-04 RX ORDER — LORATADINE 10 MG/1
10 TABLET ORAL DAILY PRN
Qty: 30 TABLET | Refills: 5 | Status: SHIPPED | OUTPATIENT
Start: 2025-03-04

## 2025-03-04 RX ORDER — ASPIRIN 81 MG/1
81 TABLET ORAL DAILY
Qty: 60 TABLET | Refills: 5 | Status: SHIPPED | OUTPATIENT
Start: 2025-03-04

## 2025-03-04 ASSESSMENT — PATIENT HEALTH QUESTIONNAIRE - PHQ9
1. LITTLE INTEREST OR PLEASURE IN DOING THINGS: NOT AT ALL
2. FEELING DOWN, DEPRESSED OR HOPELESS: NOT AT ALL
SUM OF ALL RESPONSES TO PHQ QUESTIONS 1-9: 0

## 2025-03-04 NOTE — PROGRESS NOTES
the following healthy behaviors: nutrition, exercise, medication adherence, and decrease in alcohol consumption    Discussed use, benefit, and side effects of prescribed medications.  Barriers to medication compliance addressed.  All patient questions answered.  Pt voiced understanding.     Patient given educational materials - see patient instructions    Bruce Maher MD      Department of Internal Medicine  [facility], [place]         3/4/2025, 3:06 PM  Attending Physician Statement  I have discussed the care of Carlos Weber, including pertinent history and exam findings,  with the resident. I have reviewed the key elements of all parts of the encounter with the resident.  I agree with the assessment, plan and orders as documented by the resident.  (GE Modifier)        This note is created with the assistance of a speech-recognition program. While intending to generate a document that actually reflects the content of the visit, the document can still have some mistakes which may not have been identified and corrected by editing.

## 2025-03-05 ENCOUNTER — TELEPHONE (OUTPATIENT)
Dept: GASTROENTEROLOGY | Age: 75
End: 2025-03-05

## 2025-03-05 NOTE — TELEPHONE ENCOUNTER
Last seen 03/2023/S. Florenciomad/Polyp of colon, unspecified part of colon, unspecified type/OV due to age/[place]  1st attempt- Attempted to call the pt to [facility], but he is going to have his daughter to call and [facility] an appt.

## 2025-05-16 RX ORDER — DM/PE/ACETAMINOPHEN/CHLORPHENR 10-5-325-2
100 TABLET, SEQUENTIAL ORAL DAILY
COMMUNITY
Start: 2025-04-02

## 2025-05-19 ENCOUNTER — PREP FOR PROCEDURE (OUTPATIENT)
Dept: GASTROENTEROLOGY | Age: 75
End: 2025-05-19

## 2025-05-19 ENCOUNTER — OFFICE VISIT (OUTPATIENT)
Dept: GASTROENTEROLOGY | Age: 75
End: 2025-05-19
Payer: COMMERCIAL

## 2025-05-19 VITALS
DIASTOLIC BLOOD PRESSURE: 76 MMHG | HEART RATE: 85 BPM | WEIGHT: 166.6 LBS | BODY MASS INDEX: 21.38 KG/M2 | HEIGHT: 74 IN | OXYGEN SATURATION: 98 % | SYSTOLIC BLOOD PRESSURE: 116 MMHG

## 2025-05-19 DIAGNOSIS — Z86.0101 HX OF ADENOMATOUS POLYP OF COLON: Primary | ICD-10-CM

## 2025-05-19 DIAGNOSIS — Z86.0101 HX OF ADENOMATOUS COLONIC POLYPS: ICD-10-CM

## 2025-05-19 DIAGNOSIS — Z86.0101 HX OF ADENOMATOUS POLYP OF COLON: ICD-10-CM

## 2025-05-19 DIAGNOSIS — R13.19 ESOPHAGEAL DYSPHAGIA: ICD-10-CM

## 2025-05-19 DIAGNOSIS — R13.19 ESOPHAGEAL DYSPHAGIA: Primary | ICD-10-CM

## 2025-05-19 PROCEDURE — 1126F AMNT PAIN NOTED NONE PRSNT: CPT | Performed by: STUDENT IN AN ORGANIZED HEALTH CARE EDUCATION/TRAINING PROGRAM

## 2025-05-19 PROCEDURE — 3078F DIAST BP <80 MM HG: CPT | Performed by: STUDENT IN AN ORGANIZED HEALTH CARE EDUCATION/TRAINING PROGRAM

## 2025-05-19 PROCEDURE — 99214 OFFICE O/P EST MOD 30 MIN: CPT | Performed by: STUDENT IN AN ORGANIZED HEALTH CARE EDUCATION/TRAINING PROGRAM

## 2025-05-19 PROCEDURE — 1159F MED LIST DOCD IN RCRD: CPT | Performed by: STUDENT IN AN ORGANIZED HEALTH CARE EDUCATION/TRAINING PROGRAM

## 2025-05-19 PROCEDURE — G8420 CALC BMI NORM PARAMETERS: HCPCS | Performed by: STUDENT IN AN ORGANIZED HEALTH CARE EDUCATION/TRAINING PROGRAM

## 2025-05-19 PROCEDURE — G8427 DOCREV CUR MEDS BY ELIG CLIN: HCPCS | Performed by: STUDENT IN AN ORGANIZED HEALTH CARE EDUCATION/TRAINING PROGRAM

## 2025-05-19 PROCEDURE — 1036F TOBACCO NON-USER: CPT | Performed by: STUDENT IN AN ORGANIZED HEALTH CARE EDUCATION/TRAINING PROGRAM

## 2025-05-19 PROCEDURE — 3074F SYST BP LT 130 MM HG: CPT | Performed by: STUDENT IN AN ORGANIZED HEALTH CARE EDUCATION/TRAINING PROGRAM

## 2025-05-19 PROCEDURE — 1123F ACP DISCUSS/DSCN MKR DOCD: CPT | Performed by: STUDENT IN AN ORGANIZED HEALTH CARE EDUCATION/TRAINING PROGRAM

## 2025-05-19 RX ORDER — BISACODYL 5 MG
TABLET, DELAYED RELEASE (ENTERIC COATED) ORAL
Qty: 4 TABLET | Refills: 0 | Status: SHIPPED | OUTPATIENT
Start: 2025-05-19

## 2025-05-19 RX ORDER — POLYETHYLENE GLYCOL 3350, SODIUM SULFATE ANHYDROUS, SODIUM BICARBONATE, SODIUM CHLORIDE, POTASSIUM CHLORIDE 236; 22.74; 6.74; 5.86; 2.97 G/4L; G/4L; G/4L; G/4L; G/4L
4 POWDER, FOR SOLUTION ORAL ONCE
Qty: 4000 ML | Refills: 0 | Status: SHIPPED | OUTPATIENT
Start: 2025-05-19 | End: 2025-05-19

## 2025-05-19 NOTE — PROGRESS NOTES
Reason for Referral:     Bruce Maher MD  [address] [place] [address]    Chief Complaint   Patient presents with    New Patient     Previous S. Ahmad pt    Colon Polyps     Polyp of colon, unspecified part of colon, unspecified type       1. Esophageal dysphagia    2. Hx of adenomatous polyp of colon        HISTORY OF PRESENT ILLNESS: Mr.Robert MICHELLE Weber is a 74 y.o. male with a past history remarkable for PCI old stents, diverticulitis 2023, colonic polyp, alcohol use referred as new consultation for evaluation of colonoscopy and incidental dysphagia    Regarding lower GI symptoms he denies any constipation, diarrhea, blood in stool, dark stool.  Regarding upper GI symptoms he denies GERD however he does have problem with dysphagia every once in a while food like meat gets stuck in the lower neck.  Does not recall recent EGDs.      Family medical history  No colon cancer    Social history  No smoking  Drinks 2-3 times / week and each time drinks a sixpack    Previous Endoscopies  Colonoscopy 2019 polyps.  Was supposed to have one in 2023 however no-show    Previous GI workup     Patient's PMH/PSH,SH,PSYCH Hx, MEDs, ALLERGIES, and ROS were all reviewed and updated in the appropriate sections.  I did review all the labs results available for the labs which were ordered by the primary care physician, and the other consultants, we search on [employer] at [facility] and all the available care everywhere epic  I did review all the imaging studies of the abdomen available on EMR, ordered by the primary care physician and the other consultant  I did review all the pathology from the biopsies done on the previous endoscopies    PAST MEDICAL HISTORY:  Past Medical History:   Diagnosis Date    CAD (coronary artery disease)     Episode of syncope 10/31/2019    Hyperlipidemia     Hypertension        Past Surgical History:   Procedure Laterality Date    ANKLE SURGERY      bilateral, pt states over 2 years ago

## 2025-05-19 NOTE — TELEPHONE ENCOUNTER
Procedure scheduled/Dr Jones  Procedure: egd/colon   Dx:  esophageal dysphagia, hx of adenomatous polyp of colon  Date: 9/4/25   Time: 830am arrival 7am   Hospital: [facility] phone call: n/a   Bowel Prep instructions given: golytely/dulc   In office/via phone: office   Clearance needed: cardio   GLP - 1: none

## 2025-06-01 DIAGNOSIS — I25.10 CORONARY ARTERY DISEASE INVOLVING NATIVE CORONARY ARTERY OF NATIVE HEART WITHOUT ANGINA PECTORIS: ICD-10-CM

## 2025-06-01 DIAGNOSIS — I10 ESSENTIAL HYPERTENSION: ICD-10-CM

## 2025-06-02 RX ORDER — CARVEDILOL 12.5 MG/1
12.5 TABLET ORAL 2 TIMES DAILY WITH MEALS
Qty: 60 TABLET | Refills: 1 | Status: SHIPPED | OUTPATIENT
Start: 2025-06-02

## 2025-06-02 NOTE — TELEPHONE ENCOUNTER
Carlos Weber is calling to request a refill on the following medication(s):    Medication Request:  Requested Prescriptions     Pending Prescriptions Disp Refills    carvedilol (COREG) 12.5 MG tablet [Pharmacy Med Name: CARVEDILOL 12.500 MG TABLET] 60 tablet 1     Sig: TAKE 1 TABLET BY MOUTH TWICE DAILY WITH MEALS       Last Visit Date (If Applicable):  Visit date not found    Next Visit Date:    7/1/2025

## 2025-06-27 ENCOUNTER — HOSPITAL ENCOUNTER (OUTPATIENT)
Age: 75
Setting detail: SPECIMEN
Discharge: HOME | End: 2025-06-27

## 2025-06-27 DIAGNOSIS — I25.10 CORONARY ARTERY DISEASE INVOLVING NATIVE CORONARY ARTERY OF NATIVE HEART WITHOUT ANGINA PECTORIS: ICD-10-CM

## 2025-06-27 LAB
BASOPHILS # BLD: 0.05 K/UL (ref 0–0.2)
BASOPHILS NFR BLD: 1 % (ref 0–2)
EOSINOPHIL # BLD: 0.23 K/UL (ref 0–0.44)
EOSINOPHILS RELATIVE PERCENT: 4 % (ref 1–4)
ERYTHROCYTE [DISTWIDTH] IN BLOOD BY AUTOMATED COUNT: 16.7 % (ref 11.8–14.4)
HCT VFR BLD AUTO: 36.7 % (ref 40.7–50.3)
HGB BLD-MCNC: 12.3 G/DL (ref 13–17)
IMM GRANULOCYTES # BLD AUTO: <0.03 K/UL (ref 0–0.3)
IMM GRANULOCYTES NFR BLD: 0 %
LYMPHOCYTES NFR BLD: 2.27 K/UL (ref 1.1–3.7)
LYMPHOCYTES RELATIVE PERCENT: 38 % (ref 24–43)
MCH RBC QN AUTO: 32.5 PG (ref 25.2–33.5)
MCHC RBC AUTO-ENTMCNC: 33.5 G/DL (ref 28.4–34.8)
MCV RBC AUTO: 97.1 FL (ref 82.6–102.9)
MONOCYTES NFR BLD: 0.84 K/UL (ref 0.1–1.2)
MONOCYTES NFR BLD: 14 % (ref 3–12)
NEUTROPHILS NFR BLD: 43 % (ref 36–65)
NEUTS SEG NFR BLD: 2.58 K/UL (ref 1.5–8.1)
NRBC BLD-RTO: 0 PER 100 WBC
PLATELET # BLD AUTO: 227 K/UL (ref 138–453)
PMV BLD AUTO: 11.3 FL (ref 8.1–13.5)
RBC # BLD AUTO: 3.78 M/UL (ref 4.21–5.77)
RBC # BLD: ABNORMAL 10*6/UL
WBC OTHER # BLD: 6 K/UL (ref 3.5–11.3)

## 2025-06-28 LAB
ANION GAP SERPL CALCULATED.3IONS-SCNC: 15 MMOL/L (ref 9–16)
BUN SERPL-MCNC: 13 MG/DL (ref 8–23)
CALCIUM SERPL-MCNC: 9.3 MG/DL (ref 8.6–10.4)
CHLORIDE SERPL-SCNC: 105 MMOL/L (ref 98–107)
CO2 SERPL-SCNC: 21 MMOL/L (ref 20–31)
CREAT SERPL-MCNC: 1.1 MG/DL (ref 0.7–1.2)
GFR, ESTIMATED: 70 ML/MIN/1.73M2
GLUCOSE SERPL-MCNC: 93 MG/DL (ref 74–99)
POTASSIUM SERPL-SCNC: 3.7 MMOL/L (ref 3.7–5.3)
SODIUM SERPL-SCNC: 141 MMOL/L (ref 136–145)

## 2025-06-29 ENCOUNTER — RESULTS FOLLOW-UP (OUTPATIENT)
Dept: CRITICAL CARE MEDICINE | Age: 75
End: 2025-06-29

## 2025-06-30 DIAGNOSIS — I50.22 NYHA CLASS 2 AND ACC/AHA STAGE C CHRONIC SYSTOLIC CONGESTIVE HEART FAILURE (HCC): ICD-10-CM

## 2025-06-30 DIAGNOSIS — I10 ESSENTIAL HYPERTENSION: ICD-10-CM

## 2025-06-30 DIAGNOSIS — I25.10 CORONARY ARTERY DISEASE INVOLVING NATIVE CORONARY ARTERY OF NATIVE HEART WITHOUT ANGINA PECTORIS: ICD-10-CM

## 2025-06-30 RX ORDER — CARVEDILOL 12.5 MG/1
12.5 TABLET ORAL 2 TIMES DAILY WITH MEALS
Qty: 60 TABLET | Refills: 2 | Status: SHIPPED | OUTPATIENT
Start: 2025-06-30

## 2025-06-30 RX ORDER — LISINOPRIL 2.5 MG/1
2.5 TABLET ORAL DAILY
Qty: 90 TABLET | Refills: 0 | Status: SHIPPED | OUTPATIENT
Start: 2025-06-30 | End: 2025-09-28

## 2025-06-30 NOTE — TELEPHONE ENCOUNTER
Carlos Weber is calling to request a refill on the following medication(s):    Medication Request:  Requested Prescriptions     Pending Prescriptions Disp Refills    lisinopril (PRINIVIL;ZESTRIL) 2.5 MG tablet [Pharmacy Med Name: LISINOPRIL 2.500 MG TABLET] 30 tablet 1     Sig: TAKE 1 TABLET BY MOUTH DAILY    carvedilol (COREG) 12.5 MG tablet [Pharmacy Med Name: CARVEDILOL 12.500 MG TABLET] 60 tablet 0     Sig: TAKE 1 TABLET BY MOUTH TWICE DAILY WITH MEALS       Last Visit Date (If Applicable):  Visit date not found    Next Visit Date:    7/1/2025

## 2025-07-01 ENCOUNTER — OFFICE VISIT (OUTPATIENT)
Age: 75
End: 2025-07-01
Payer: COMMERCIAL

## 2025-07-01 VITALS
HEART RATE: 88 BPM | TEMPERATURE: 97.3 F | DIASTOLIC BLOOD PRESSURE: 37 MMHG | HEIGHT: 74 IN | OXYGEN SATURATION: 99 % | WEIGHT: 169 LBS | BODY MASS INDEX: 21.69 KG/M2 | SYSTOLIC BLOOD PRESSURE: 104 MMHG

## 2025-07-01 DIAGNOSIS — G95.9 CERVICAL MYELOPATHY (HCC): ICD-10-CM

## 2025-07-01 DIAGNOSIS — Z86.0101 HX OF ADENOMATOUS COLONIC POLYPS: ICD-10-CM

## 2025-07-01 DIAGNOSIS — I25.10 CORONARY ARTERY DISEASE INVOLVING NATIVE CORONARY ARTERY OF NATIVE HEART WITHOUT ANGINA PECTORIS: ICD-10-CM

## 2025-07-01 DIAGNOSIS — G89.29 CHRONIC LEFT SHOULDER PAIN: Primary | ICD-10-CM

## 2025-07-01 DIAGNOSIS — M25.512 CHRONIC LEFT SHOULDER PAIN: Primary | ICD-10-CM

## 2025-07-01 DIAGNOSIS — I10 ESSENTIAL HYPERTENSION: ICD-10-CM

## 2025-07-01 PROCEDURE — 99213 OFFICE O/P EST LOW 20 MIN: CPT

## 2025-07-01 PROCEDURE — 1036F TOBACCO NON-USER: CPT

## 2025-07-01 PROCEDURE — G8420 CALC BMI NORM PARAMETERS: HCPCS

## 2025-07-01 PROCEDURE — 1159F MED LIST DOCD IN RCRD: CPT

## 2025-07-01 PROCEDURE — 1123F ACP DISCUSS/DSCN MKR DOCD: CPT

## 2025-07-01 PROCEDURE — G8427 DOCREV CUR MEDS BY ELIG CLIN: HCPCS

## 2025-07-01 PROCEDURE — 3078F DIAST BP <80 MM HG: CPT

## 2025-07-01 PROCEDURE — 1160F RVW MEDS BY RX/DR IN RCRD: CPT

## 2025-07-01 PROCEDURE — 3074F SYST BP LT 130 MM HG: CPT

## 2025-07-01 RX ORDER — LIDOCAINE 50 MG/G
1 PATCH TOPICAL DAILY
Qty: 30 PATCH | Refills: 0 | Status: SHIPPED | OUTPATIENT
Start: 2025-07-01 | End: 2025-07-31

## 2025-07-01 RX ORDER — IBUPROFEN 600 MG/1
600 TABLET, FILM COATED ORAL 3 TIMES DAILY PRN
Qty: 21 TABLET | Refills: 0 | Status: SHIPPED | OUTPATIENT
Start: 2025-07-01 | End: 2025-07-08

## 2025-07-01 RX ORDER — FAMOTIDINE 20 MG/1
20 TABLET, FILM COATED ORAL 2 TIMES DAILY
Qty: 60 TABLET | Refills: 3 | Status: SHIPPED | OUTPATIENT
Start: 2025-07-01

## 2025-07-01 RX ORDER — BLOOD PRESSURE TEST KIT
1 KIT MISCELLANEOUS 3 TIMES DAILY
Qty: 1 KIT | Refills: 0 | Status: SHIPPED | OUTPATIENT
Start: 2025-07-01

## 2025-07-01 SDOH — ECONOMIC STABILITY: FOOD INSECURITY: WITHIN THE PAST 12 MONTHS, THE FOOD YOU BOUGHT JUST DIDN'T LAST AND YOU DIDN'T HAVE MONEY TO GET MORE.: NEVER TRUE

## 2025-07-01 SDOH — ECONOMIC STABILITY: FOOD INSECURITY: WITHIN THE PAST 12 MONTHS, YOU WORRIED THAT YOUR FOOD WOULD RUN OUT BEFORE YOU GOT MONEY TO BUY MORE.: NEVER TRUE

## 2025-07-01 ASSESSMENT — PATIENT HEALTH QUESTIONNAIRE - PHQ9
5. POOR APPETITE OR OVEREATING: SEVERAL DAYS
SUM OF ALL RESPONSES TO PHQ QUESTIONS 1-9: 1
4. FEELING TIRED OR HAVING LITTLE ENERGY: NOT AT ALL
9. THOUGHTS THAT YOU WOULD BE BETTER OFF DEAD, OR OF HURTING YOURSELF: NOT AT ALL
SUM OF ALL RESPONSES TO PHQ QUESTIONS 1-9: 1
1. LITTLE INTEREST OR PLEASURE IN DOING THINGS: NOT AT ALL
7. TROUBLE CONCENTRATING ON THINGS, SUCH AS READING THE NEWSPAPER OR WATCHING TELEVISION: NOT AT ALL
3. TROUBLE FALLING OR STAYING ASLEEP: NOT AT ALL
2. FEELING DOWN, DEPRESSED OR HOPELESS: NOT AT ALL
6. FEELING BAD ABOUT YOURSELF - OR THAT YOU ARE A FAILURE OR HAVE LET YOURSELF OR YOUR FAMILY DOWN: NOT AT ALL
10. IF YOU CHECKED OFF ANY PROBLEMS, HOW DIFFICULT HAVE THESE PROBLEMS MADE IT FOR YOU TO DO YOUR WORK, TAKE CARE OF THINGS AT HOME, OR GET ALONG WITH OTHER PEOPLE: NOT DIFFICULT AT ALL
SUM OF ALL RESPONSES TO PHQ QUESTIONS 1-9: 1
SUM OF ALL RESPONSES TO PHQ QUESTIONS 1-9: 1
8. MOVING OR SPEAKING SO SLOWLY THAT OTHER PEOPLE COULD HAVE NOTICED. OR THE OPPOSITE, BEING SO FIGETY OR RESTLESS THAT YOU HAVE BEEN MOVING AROUND A LOT MORE THAN USUAL: NOT AT ALL

## 2025-07-01 NOTE — PROGRESS NOTES
The patient was evaluated today and a DME order was entered for a BP monitor with (size of cuff)  because they require this for ongoing treatment and monitoring of HTN.  The patient has been sufficiently trained to use the device and has the skills to be able to use this device as intended. The patient will require testing 1-2 times per day to achieve optimal BP control. The need for this equipment was discussed with the patient and she understands and is in agreement.    Bruce Maher MD  Internal Medicine Resident, PGY-2  [facility]; [place], [place]  7/1/2025, 2:58 PM    
  CT CERVICAL SPINE W WO CONTRAST; Future    Coronary artery disease s/p stent in 2014 by Dr. Tamiko Gilman at [facility]  Follows up with cardiology  Continue aspirin, statin,  Continue Coreg  Advised to hold off on lisinopril    Essential hypertension  -     Blood Pressure KIT; 1 kit by Does not apply route in the morning, at noon, and at bedtime  -     DME Order for (Specify) as OP  - Advised daughter and patient to pick blood pressure kit up and monitor blood pressures at home and call them to the office with readings, will adjust medications    Hx of adenomatous colonic polyps  -Has seen GI in office  EGD and colonoscopy scheduled    ________________________________________________________________________  Follow up and instructions:  Return in about 3 months (around 10/1/2025).    Carlos received counseling on the following healthy behaviors: nutrition, exercise, medication adherence, and decrease in alcohol consumption    Discussed use, benefit, and side effects of prescribed medications.  Barriers to medication compliance addressed.  All patient questions answered.  Pt voiced understanding.     Patient given educational materials - see patient instructions    Bruce Maher MD      Department of Internal Medicine  [facility], [place]         7/1/2025, 3:03 PM  Attending Physician Statement  I have discussed the care of Carlos Weber, including pertinent history and exam findings,  with the resident. I have reviewed the key elements of all parts of the encounter with the resident.  I agree with the assessment, plan and orders as documented by the resident.  (GE Modifier)      This note is created with the assistance of a speech-recognition program. While intending to generate a document that actually reflects the content of the visit, the document can still have some mistakes which may not have been identified and corrected by editing.

## 2025-07-03 ENCOUNTER — TELEPHONE (OUTPATIENT)
Age: 75
End: 2025-07-03

## 2025-07-03 NOTE — TELEPHONE ENCOUNTER
Received request for PA in/on faxed for lidocaine 5% patches.    PA completed and sent to insurance via [email or web address]

## 2025-07-07 NOTE — TELEPHONE ENCOUNTER
Medication approved: Approved. This drug has been approved under the Member's Medicare Part D benefit. Approved quantity: 30 units per 30 day(s). You may fill up to a 90 day supply except for those on Specialty Tier 5, which can be filled up to a 30 day supply. Please call the pharmacy to process the prescription claim.  Effective Date: 6/19/2025  Authorization Expiration Date: 12/31/2099    PC to pharmacy to notify.

## 2025-07-18 ENCOUNTER — TELEPHONE (OUTPATIENT)
Age: 75
End: 2025-07-18

## 2025-07-24 ENCOUNTER — TELEPHONE (OUTPATIENT)
Age: 75
End: 2025-07-24

## 2025-08-19 DIAGNOSIS — I10 ESSENTIAL HYPERTENSION: ICD-10-CM

## 2025-08-19 DIAGNOSIS — I25.10 CORONARY ARTERY DISEASE INVOLVING NATIVE CORONARY ARTERY OF NATIVE HEART WITHOUT ANGINA PECTORIS: ICD-10-CM

## 2025-08-19 RX ORDER — CARVEDILOL 12.5 MG/1
12.5 TABLET ORAL 2 TIMES DAILY WITH MEALS
Qty: 180 TABLET | Refills: 1 | Status: CANCELLED | OUTPATIENT
Start: 2025-08-19

## 2025-09-04 ENCOUNTER — ANESTHESIA EVENT (OUTPATIENT)
Dept: OPERATING ROOM | Age: 75
End: 2025-09-04
Payer: COMMERCIAL

## 2025-09-04 ENCOUNTER — HOSPITAL ENCOUNTER (OUTPATIENT)
Age: 75
Setting detail: OUTPATIENT SURGERY
Discharge: HOME | End: 2025-09-04
Attending: STUDENT IN AN ORGANIZED HEALTH CARE EDUCATION/TRAINING PROGRAM | Admitting: STUDENT IN AN ORGANIZED HEALTH CARE EDUCATION/TRAINING PROGRAM
Payer: COMMERCIAL

## 2025-09-04 ENCOUNTER — ANESTHESIA (OUTPATIENT)
Dept: OPERATING ROOM | Age: 75
End: 2025-09-04
Payer: COMMERCIAL

## 2025-09-04 VITALS
HEIGHT: 74 IN | BODY MASS INDEX: 22.27 KG/M2 | OXYGEN SATURATION: 97 % | DIASTOLIC BLOOD PRESSURE: 99 MMHG | SYSTOLIC BLOOD PRESSURE: 148 MMHG | HEART RATE: 82 BPM | WEIGHT: 173.5 LBS | RESPIRATION RATE: 18 BRPM | TEMPERATURE: 97.9 F

## 2025-09-04 DIAGNOSIS — Z86.0101 HX OF ADENOMATOUS COLONIC POLYPS: ICD-10-CM

## 2025-09-04 DIAGNOSIS — R13.19 ESOPHAGEAL DYSPHAGIA: ICD-10-CM

## 2025-09-04 PROBLEM — D12.2 ADENOMATOUS POLYP OF ASCENDING COLON: Status: ACTIVE | Noted: 2025-09-04

## 2025-09-04 PROBLEM — K22.2 PEPTIC STRICTURE OF ESOPHAGUS: Status: ACTIVE | Noted: 2025-09-04

## 2025-09-04 PROBLEM — K57.90 DIVERTICULOSIS: Status: ACTIVE | Noted: 2025-09-04

## 2025-09-04 PROCEDURE — 2720000010 HC SURG SUPPLY STERILE: Performed by: STUDENT IN AN ORGANIZED HEALTH CARE EDUCATION/TRAINING PROGRAM

## 2025-09-04 PROCEDURE — 7100000010 HC PHASE II RECOVERY - FIRST 15 MIN: Performed by: STUDENT IN AN ORGANIZED HEALTH CARE EDUCATION/TRAINING PROGRAM

## 2025-09-04 PROCEDURE — 3700000001 HC ADD 15 MINUTES (ANESTHESIA): Performed by: STUDENT IN AN ORGANIZED HEALTH CARE EDUCATION/TRAINING PROGRAM

## 2025-09-04 PROCEDURE — C1769 GUIDE WIRE: HCPCS | Performed by: STUDENT IN AN ORGANIZED HEALTH CARE EDUCATION/TRAINING PROGRAM

## 2025-09-04 PROCEDURE — 3609012700 HC EGD DILATION SAVORY: Performed by: STUDENT IN AN ORGANIZED HEALTH CARE EDUCATION/TRAINING PROGRAM

## 2025-09-04 PROCEDURE — 2709999900 HC NON-CHARGEABLE SUPPLY: Performed by: STUDENT IN AN ORGANIZED HEALTH CARE EDUCATION/TRAINING PROGRAM

## 2025-09-04 PROCEDURE — 2580000003 HC RX 258: Performed by: ANESTHESIOLOGY

## 2025-09-04 PROCEDURE — 6360000002 HC RX W HCPCS: Performed by: NURSE ANESTHETIST, CERTIFIED REGISTERED

## 2025-09-04 PROCEDURE — 3609012400 HC EGD TRANSORAL BIOPSY SINGLE/MULTIPLE: Performed by: STUDENT IN AN ORGANIZED HEALTH CARE EDUCATION/TRAINING PROGRAM

## 2025-09-04 PROCEDURE — 3700000000 HC ANESTHESIA ATTENDED CARE: Performed by: STUDENT IN AN ORGANIZED HEALTH CARE EDUCATION/TRAINING PROGRAM

## 2025-09-04 PROCEDURE — 3609010300 HC COLONOSCOPY W/BIOPSY SINGLE/MULTIPLE: Performed by: STUDENT IN AN ORGANIZED HEALTH CARE EDUCATION/TRAINING PROGRAM

## 2025-09-04 PROCEDURE — 7100000011 HC PHASE II RECOVERY - ADDTL 15 MIN: Performed by: STUDENT IN AN ORGANIZED HEALTH CARE EDUCATION/TRAINING PROGRAM

## 2025-09-04 RX ORDER — SODIUM CHLORIDE 9 MG/ML
INJECTION, SOLUTION INTRAVENOUS CONTINUOUS
Status: DISCONTINUED | OUTPATIENT
Start: 2025-09-04 | End: 2025-09-04 | Stop reason: HOSPADM

## 2025-09-04 RX ORDER — PANTOPRAZOLE SODIUM 40 MG/1
40 TABLET, DELAYED RELEASE ORAL
Qty: 100 TABLET | Refills: 1 | Status: SHIPPED | OUTPATIENT
Start: 2025-09-04

## 2025-09-04 RX ORDER — ESMOLOL HYDROCHLORIDE 10 MG/ML
INJECTION INTRAVENOUS
Status: DISCONTINUED | OUTPATIENT
Start: 2025-09-04 | End: 2025-09-04 | Stop reason: SDUPTHER

## 2025-09-04 RX ORDER — LIDOCAINE HYDROCHLORIDE 10 MG/ML
1 INJECTION, SOLUTION EPIDURAL; INFILTRATION; INTRACAUDAL; PERINEURAL
Status: DISCONTINUED | OUTPATIENT
Start: 2025-09-05 | End: 2025-09-04 | Stop reason: HOSPADM

## 2025-09-04 RX ORDER — SODIUM CHLORIDE, SODIUM LACTATE, POTASSIUM CHLORIDE, CALCIUM CHLORIDE 600; 310; 30; 20 MG/100ML; MG/100ML; MG/100ML; MG/100ML
INJECTION, SOLUTION INTRAVENOUS CONTINUOUS
Status: DISCONTINUED | OUTPATIENT
Start: 2025-09-04 | End: 2025-09-04 | Stop reason: HOSPADM

## 2025-09-04 RX ORDER — PROPOFOL 10 MG/ML
INJECTION, EMULSION INTRAVENOUS
Status: DISCONTINUED | OUTPATIENT
Start: 2025-09-04 | End: 2025-09-04 | Stop reason: SDUPTHER

## 2025-09-04 RX ORDER — LIDOCAINE HYDROCHLORIDE 20 MG/ML
INJECTION, SOLUTION INFILTRATION; PERINEURAL
Status: DISCONTINUED | OUTPATIENT
Start: 2025-09-04 | End: 2025-09-04 | Stop reason: SDUPTHER

## 2025-09-04 RX ADMIN — SODIUM CHLORIDE, SODIUM LACTATE, POTASSIUM CHLORIDE, AND CALCIUM CHLORIDE: .6; .31; .03; .02 INJECTION, SOLUTION INTRAVENOUS at 08:13

## 2025-09-04 RX ADMIN — PROPOFOL 30 MG: 10 INJECTION, EMULSION INTRAVENOUS at 08:51

## 2025-09-04 RX ADMIN — ESMOLOL HYDROCHLORIDE 30 MG: 10 INJECTION, SOLUTION INTRAVENOUS at 08:55

## 2025-09-04 RX ADMIN — PROPOFOL 100 MCG/KG/MIN: 10 INJECTION, EMULSION INTRAVENOUS at 08:50

## 2025-09-04 RX ADMIN — PROPOFOL 40 MG: 10 INJECTION, EMULSION INTRAVENOUS at 08:49

## 2025-09-04 RX ADMIN — LIDOCAINE HYDROCHLORIDE 60 MG: 20 INJECTION, SOLUTION INFILTRATION; PERINEURAL at 08:49

## 2025-09-04 RX ADMIN — PROPOFOL 20 MG: 10 INJECTION, EMULSION INTRAVENOUS at 08:55

## 2025-09-04 ASSESSMENT — ENCOUNTER SYMPTOMS
SINUS PRESSURE: 0
WHEEZING: 0
CONSTIPATION: 0
DIARRHEA: 0
COLOR CHANGE: 0
BLOOD IN STOOL: 0
VOMITING: 1
COUGH: 0
CHEST TIGHTNESS: 0
NAUSEA: 0
SINUS PAIN: 0
SORE THROAT: 0
SHORTNESS OF BREATH: 0

## 2025-09-04 ASSESSMENT — PAIN SCALES - GENERAL
PAINLEVEL_OUTOF10: 0

## 2025-09-04 ASSESSMENT — PAIN - FUNCTIONAL ASSESSMENT: PAIN_FUNCTIONAL_ASSESSMENT: 0-10

## (undated) DEVICE — BITE BLOCK ENDOSCP AD 60 FR W/ ADJ STRP PLAS GRN BLOX

## (undated) DEVICE — ENDOSCOPIC KIT 1.1+ 10 FT DE 2 GWN AAMI LEVEL 3

## (undated) DEVICE — GLOVE ORTHO 8   MSG9480

## (undated) DEVICE — PACK SURG ABD SVMMC

## (undated) DEVICE — GAUZE,SPONGE,FLUFF,6"X6.75",STRL,5/TRAY: Brand: MEDLINE

## (undated) DEVICE — MANIFOLD SUCT 4 PRT 2 CANSTR FLTR DISP NEPTUNE 2

## (undated) DEVICE — SOLUTION SCRB 4OZ 4% CHG H2O AIDED FOR PREOPERATIVE SKIN

## (undated) DEVICE — CATHETER,URETHRAL,REDRUBBER,STRL,16FR: Brand: MEDLINE

## (undated) DEVICE — SUTURE PDS + SZ 2 0 L27IN ABSRB VLT L26MM SH 1 2 CIR PDP317H

## (undated) DEVICE — GUIDEWIRE ENDOSCP MARKED SPRING TIP 210 CM SAFEGUIDE DISP

## (undated) DEVICE — GOWN,AURORA,NONRNF,XL,30/CS: Brand: MEDLINE

## (undated) DEVICE — TOTAL TRAY, 16FR 10ML SIL FOLEY, URN: Brand: MEDLINE

## (undated) DEVICE — GLOVE ORANGE PI 7 1/2   MSG9075

## (undated) DEVICE — BANDAGE COBAN 4 IN COMPR W4INXL5YD FOAM COHESIVE QUIK STK SELF ADH SFT

## (undated) DEVICE — CUP MED 60ML 2OZ CLR GRAD DISP PLAS NO LID

## (undated) DEVICE — GLOVE SURG SZ 85 CRM LTX FREE POLYISOPRENE POLYMER BEAD ANTI

## (undated) DEVICE — SYRINGE INFL 60ML DISP ALLIANCE II

## (undated) DEVICE — FORCEPS BX 240CM 2.4MM L NDL RAD JAW 4 M00513334

## (undated) DEVICE — Z DISCONTINUED BY MEDLINE USE 2711682 TRAY SKIN PREP DRY W/ PREM GLV

## (undated) DEVICE — GARMENT,MEDLINE,DVT,INT,CALF,MED, GEN2: Brand: MEDLINE

## (undated) DEVICE — UNDERPAD HOSP W30XL36IN GRN POLYPR HI ABSRB DISP

## (undated) DEVICE — DILATOR ENDOSCP L180CM CATH 6FR BLLN L8CM INFLATED M00558360